# Patient Record
Sex: FEMALE | Race: WHITE | Employment: UNEMPLOYED | URBAN - METROPOLITAN AREA
[De-identification: names, ages, dates, MRNs, and addresses within clinical notes are randomized per-mention and may not be internally consistent; named-entity substitution may affect disease eponyms.]

---

## 2017-03-14 ENCOUNTER — HOSPITAL ENCOUNTER (OUTPATIENT)
Dept: MAMMOGRAPHY | Age: 42
Discharge: HOME | End: 2017-03-14
Attending: INTERNAL MEDICINE
Payer: COMMERCIAL

## 2017-03-14 DIAGNOSIS — Z12.31 VISIT FOR SCREENING MAMMOGRAM: ICD-10-CM

## 2017-03-14 PROCEDURE — 77067 SCR MAMMO BI INCL CAD: CPT

## 2018-03-16 ENCOUNTER — HOSPITAL ENCOUNTER (OUTPATIENT)
Dept: MAMMOGRAPHY | Age: 43
Discharge: HOME | End: 2018-03-16
Attending: INTERNAL MEDICINE
Payer: COMMERCIAL

## 2018-03-16 DIAGNOSIS — Z12.39 BREAST CANCER SCREENING: ICD-10-CM

## 2018-03-16 PROCEDURE — 77067 SCR MAMMO BI INCL CAD: CPT | Performed by: INTERNAL MEDICINE

## 2019-03-20 ENCOUNTER — HOSPITAL ENCOUNTER (OUTPATIENT)
Dept: MAMMOGRAPHY | Age: 44
Discharge: HOME | End: 2019-03-20
Attending: INTERNAL MEDICINE
Payer: COMMERCIAL

## 2019-03-20 DIAGNOSIS — Z12.31 ENCOUNTER FOR SCREENING MAMMOGRAM FOR MALIGNANT NEOPLASM OF BREAST: ICD-10-CM

## 2019-03-20 PROCEDURE — 77063 BREAST TOMOSYNTHESIS BI: CPT | Performed by: INTERNAL MEDICINE

## 2019-03-20 PROCEDURE — 77067 SCR MAMMO BI INCL CAD: CPT | Performed by: INTERNAL MEDICINE

## 2019-03-29 ENCOUNTER — HOSPITAL ENCOUNTER (OUTPATIENT)
Dept: ULTRASOUND IMAGING | Age: 44
Discharge: HOME | End: 2019-03-29
Attending: INTERNAL MEDICINE
Payer: COMMERCIAL

## 2019-03-29 ENCOUNTER — HOSPITAL ENCOUNTER (OUTPATIENT)
Dept: MAMMOGRAPHY | Age: 44
Discharge: HOME | End: 2019-03-29
Attending: INTERNAL MEDICINE
Payer: COMMERCIAL

## 2019-03-29 DIAGNOSIS — R92.2 INCONCLUSIVE MAMMOGRAM: ICD-10-CM

## 2019-03-29 PROCEDURE — 77065 DX MAMMO INCL CAD UNI: CPT | Performed by: INTERNAL MEDICINE

## 2019-03-29 PROCEDURE — 76642 ULTRASOUND BREAST LIMITED: CPT | Performed by: INTERNAL MEDICINE

## 2019-03-29 PROCEDURE — 77061 BREAST TOMOSYNTHESIS UNI: CPT | Performed by: INTERNAL MEDICINE

## 2020-06-11 ENCOUNTER — OFFICE VISIT (OUTPATIENT)
Dept: INTERNAL MEDICINE CLINIC | Facility: CLINIC | Age: 45
End: 2020-06-11
Payer: COMMERCIAL

## 2020-06-11 VITALS
OXYGEN SATURATION: 97 % | WEIGHT: 159 LBS | SYSTOLIC BLOOD PRESSURE: 122 MMHG | DIASTOLIC BLOOD PRESSURE: 68 MMHG | HEART RATE: 112 BPM | BODY MASS INDEX: 28.17 KG/M2 | HEIGHT: 63 IN | RESPIRATION RATE: 16 BRPM | TEMPERATURE: 98 F

## 2020-06-11 DIAGNOSIS — E10.9 TYPE 1 DIABETES MELLITUS WITHOUT COMPLICATION (HCC): ICD-10-CM

## 2020-06-11 DIAGNOSIS — Z00.00 ANNUAL PHYSICAL EXAM: Primary | ICD-10-CM

## 2020-06-11 DIAGNOSIS — Z13.89 SCREENING FOR GENITOURINARY CONDITION: ICD-10-CM

## 2020-06-11 DIAGNOSIS — Z20.822 ENCOUNTER FOR PREOPERATIVE SCREENING LABORATORY TESTING FOR COVID-19 VIRUS: ICD-10-CM

## 2020-06-11 DIAGNOSIS — Z13.220 LIPID SCREENING: ICD-10-CM

## 2020-06-11 DIAGNOSIS — Z13.29 THYROID DISORDER SCREEN: ICD-10-CM

## 2020-06-11 DIAGNOSIS — Z01.812 ENCOUNTER FOR PREOPERATIVE SCREENING LABORATORY TESTING FOR COVID-19 VIRUS: ICD-10-CM

## 2020-06-11 DIAGNOSIS — Z00.00 LABORATORY EXAMINATION ORDERED AS PART OF A ROUTINE GENERAL MEDICAL EXAMINATION: ICD-10-CM

## 2020-06-11 DIAGNOSIS — Z13.0 SCREENING, IRON DEFICIENCY ANEMIA: ICD-10-CM

## 2020-06-11 DIAGNOSIS — Z12.31 ENCOUNTER FOR SCREENING MAMMOGRAM FOR MALIGNANT NEOPLASM OF BREAST: ICD-10-CM

## 2020-06-11 DIAGNOSIS — I20.8 ATYPICAL ANGINA (HCC): ICD-10-CM

## 2020-06-11 PROBLEM — Z82.49 FAMILY HISTORY OF PREMATURE CAD: Status: ACTIVE | Noted: 2020-06-11

## 2020-06-11 PROCEDURE — 93000 ELECTROCARDIOGRAM COMPLETE: CPT | Performed by: INTERNAL MEDICINE

## 2020-06-11 PROCEDURE — 99213 OFFICE O/P EST LOW 20 MIN: CPT | Performed by: INTERNAL MEDICINE

## 2020-06-11 PROCEDURE — 99386 PREV VISIT NEW AGE 40-64: CPT | Performed by: INTERNAL MEDICINE

## 2020-06-11 RX ORDER — MULTIVIT-MIN/IRON/FOLIC ACID/K 18-600-40
50 CAPSULE ORAL DAILY
COMMUNITY

## 2020-06-11 RX ORDER — INSULIN ASPART 100 [IU]/ML
INJECTION, SOLUTION INTRAVENOUS; SUBCUTANEOUS
Qty: 7 VIAL | Refills: 0 | Status: SHIPPED | OUTPATIENT
Start: 2020-06-11 | End: 2020-09-04

## 2020-06-11 RX ORDER — ECHINACEA 400 MG
1000 CAPSULE ORAL DAILY
COMMUNITY

## 2020-06-11 RX ORDER — ROSUVASTATIN CALCIUM 20 MG/1
20 TABLET, COATED ORAL DAILY
Qty: 90 TABLET | Refills: 3 | Status: SHIPPED | OUTPATIENT
Start: 2020-06-11 | End: 2021-08-15

## 2020-06-11 RX ORDER — ATORVASTATIN CALCIUM 20 MG/1
20 TABLET, FILM COATED ORAL NIGHTLY
COMMUNITY
End: 2020-06-11

## 2020-06-11 NOTE — PATIENT INSTRUCTIONS
Diabetes: Activity Tips    Being more active can help you manage your diabetes. The tips on this sheet can help you get the most from your exercise. They can also help you stay safe.    Staying active   It’s important for adults to spend less time sitting You may be told to plan your exercise for 1 to 2 hours after a meal. In most cases, you don’t need to eat while being active. Test your blood sugar before exercising if you take insulin or medicine that can cause low blood sugar.  And carry a fast-acting condon

## 2020-06-11 NOTE — PROGRESS NOTES
HPI:    Patient ID: Jenny Foster is a 38year old female. HPI  HPI:   Jenny Foster is a 38year old female who presents for a complete physical exam. Symptoms: denies discharge, itching, burning or dysuria, periods are regular.  Patient complai mg by mouth daily. • Flaxseed, Linseed, (FLAXSEED OIL) 1000 MG Oral Cap Take 1,000 mg by mouth daily. • Rosuvastatin Calcium (CRESTOR) 20 MG Oral Tab Take 1 tablet (20 mg total) by mouth daily.  90 tablet 3   • insulin aspart (NOVOLOG) 100 UNIT/ML S (Oral)   Resp 16   Ht 63\"   Wt 159 lb (72.1 kg)   LMP 05/17/2020   SpO2 97%   BMI 28.17 kg/m²   Body mass index is 28.17 kg/m².    GENERAL: well developed, well nourished,in no apparent distress  SKIN: no rashes,no suspicious lesions  HEENT: atraumatic, no (VITAMIN D) 50 MCG (2000 UT) Oral Cap Take 50 capsules by mouth daily. • Vitamin E 180 MG Oral Cap Take 180 mg by mouth daily. • Flaxseed, Linseed, (FLAXSEED OIL) 1000 MG Oral Cap Take 1,000 mg by mouth daily.      • Rosuvastatin Calcium (CRESTOR) 2 ST#1434

## 2020-06-12 ENCOUNTER — HOSPITAL ENCOUNTER (OUTPATIENT)
Dept: MAMMOGRAPHY | Age: 45
Discharge: HOME | End: 2020-06-12
Attending: INTERNAL MEDICINE
Payer: COMMERCIAL

## 2020-06-12 DIAGNOSIS — Z12.31 ENCOUNTER FOR SCREENING MAMMOGRAM FOR MALIGNANT NEOPLASM OF BREAST: ICD-10-CM

## 2020-06-12 PROCEDURE — 77063 BREAST TOMOSYNTHESIS BI: CPT | Performed by: INTERNAL MEDICINE

## 2020-06-12 PROCEDURE — 77067 SCR MAMMO BI INCL CAD: CPT | Performed by: INTERNAL MEDICINE

## 2020-06-13 ENCOUNTER — LAB ENCOUNTER (OUTPATIENT)
Dept: LAB | Age: 45
End: 2020-06-13
Attending: INTERNAL MEDICINE
Payer: COMMERCIAL

## 2020-06-13 DIAGNOSIS — Z13.29 THYROID DISORDER SCREEN: ICD-10-CM

## 2020-06-13 DIAGNOSIS — Z13.220 LIPID SCREENING: ICD-10-CM

## 2020-06-13 DIAGNOSIS — E10.9 TYPE 1 DIABETES MELLITUS WITHOUT COMPLICATION (HCC): ICD-10-CM

## 2020-06-13 DIAGNOSIS — Z13.89 SCREENING FOR GENITOURINARY CONDITION: ICD-10-CM

## 2020-06-13 DIAGNOSIS — Z00.00 LABORATORY EXAMINATION ORDERED AS PART OF A ROUTINE GENERAL MEDICAL EXAMINATION: ICD-10-CM

## 2020-06-13 DIAGNOSIS — Z13.0 SCREENING, IRON DEFICIENCY ANEMIA: ICD-10-CM

## 2020-06-13 PROCEDURE — 82043 UR ALBUMIN QUANTITATIVE: CPT | Performed by: INTERNAL MEDICINE

## 2020-06-13 PROCEDURE — 81001 URINALYSIS AUTO W/SCOPE: CPT | Performed by: INTERNAL MEDICINE

## 2020-06-13 PROCEDURE — 83036 HEMOGLOBIN GLYCOSYLATED A1C: CPT | Performed by: INTERNAL MEDICINE

## 2020-06-13 PROCEDURE — 36415 COLL VENOUS BLD VENIPUNCTURE: CPT | Performed by: INTERNAL MEDICINE

## 2020-06-13 PROCEDURE — 80061 LIPID PANEL: CPT | Performed by: INTERNAL MEDICINE

## 2020-06-13 PROCEDURE — 80050 GENERAL HEALTH PANEL: CPT | Performed by: INTERNAL MEDICINE

## 2020-06-13 PROCEDURE — 82570 ASSAY OF URINE CREATININE: CPT | Performed by: INTERNAL MEDICINE

## 2020-06-15 ENCOUNTER — TELEPHONE (OUTPATIENT)
Dept: INTERNAL MEDICINE CLINIC | Facility: CLINIC | Age: 45
End: 2020-06-15

## 2020-06-15 DIAGNOSIS — E10.9 TYPE 1 DIABETES MELLITUS WITHOUT COMPLICATION (HCC): Primary | ICD-10-CM

## 2020-06-15 NOTE — TELEPHONE ENCOUNTER
----- Message from Garland Louis MD sent at 6/15/2020  8:40 AM CDT -----  Cont current med plan  dm2 uncontrolled. plan recheck a1c in 3 m  Normal cbc, renal/lft and UA  Cont statin for cad risk reduction

## 2020-06-15 NOTE — TELEPHONE ENCOUNTER
Left detailed message regarding results and recommendations. Call office with questions. Order placed.

## 2020-06-29 ENCOUNTER — MED REC SCAN ONLY (OUTPATIENT)
Dept: INTERNAL MEDICINE CLINIC | Facility: CLINIC | Age: 45
End: 2020-06-29

## 2020-08-13 ENCOUNTER — MED REC SCAN ONLY (OUTPATIENT)
Dept: INTERNAL MEDICINE CLINIC | Facility: CLINIC | Age: 45
End: 2020-08-13

## 2020-09-04 DIAGNOSIS — E10.9 TYPE 1 DIABETES MELLITUS WITHOUT COMPLICATION (HCC): ICD-10-CM

## 2020-09-04 RX ORDER — INSULIN ASPART 100 [IU]/ML
INJECTION, SOLUTION INTRAVENOUS; SUBCUTANEOUS
Qty: 70 ML | Refills: 0 | Status: SHIPPED | OUTPATIENT
Start: 2020-09-04 | End: 2020-12-17

## 2020-09-04 NOTE — TELEPHONE ENCOUNTER
Last time medication was refilled 6/11/2020  Quantity and number of refills 7 w/ 0  Last OV 6/11/2020  Next OV 9/11/2020

## 2020-09-11 ENCOUNTER — OFFICE VISIT (OUTPATIENT)
Dept: INTERNAL MEDICINE CLINIC | Facility: CLINIC | Age: 45
End: 2020-09-11
Payer: COMMERCIAL

## 2020-09-11 ENCOUNTER — LAB ENCOUNTER (OUTPATIENT)
Dept: LAB | Age: 45
End: 2020-09-11
Attending: INTERNAL MEDICINE
Payer: COMMERCIAL

## 2020-09-11 VITALS
HEIGHT: 63 IN | HEART RATE: 98 BPM | SYSTOLIC BLOOD PRESSURE: 118 MMHG | RESPIRATION RATE: 18 BRPM | OXYGEN SATURATION: 99 % | DIASTOLIC BLOOD PRESSURE: 64 MMHG | BODY MASS INDEX: 29.41 KG/M2 | TEMPERATURE: 99 F | WEIGHT: 166 LBS

## 2020-09-11 DIAGNOSIS — N93.8 DUB (DYSFUNCTIONAL UTERINE BLEEDING): ICD-10-CM

## 2020-09-11 DIAGNOSIS — Z28.21 INFLUENZA VACCINATION DECLINED BY PATIENT: ICD-10-CM

## 2020-09-11 DIAGNOSIS — Z23 NEED FOR INFLUENZA VACCINATION: ICD-10-CM

## 2020-09-11 DIAGNOSIS — E10.9 TYPE 1 DIABETES MELLITUS WITHOUT COMPLICATION (HCC): ICD-10-CM

## 2020-09-11 DIAGNOSIS — E10.9 TYPE 1 DIABETES MELLITUS WITHOUT COMPLICATION (HCC): Primary | ICD-10-CM

## 2020-09-11 LAB
EST. AVERAGE GLUCOSE BLD GHB EST-MCNC: 200 MG/DL (ref 68–126)
HBA1C MFR BLD HPLC: 8.6 % (ref ?–5.7)

## 2020-09-11 PROCEDURE — 99214 OFFICE O/P EST MOD 30 MIN: CPT | Performed by: INTERNAL MEDICINE

## 2020-09-11 PROCEDURE — 3074F SYST BP LT 130 MM HG: CPT | Performed by: INTERNAL MEDICINE

## 2020-09-11 PROCEDURE — 36415 COLL VENOUS BLD VENIPUNCTURE: CPT | Performed by: INTERNAL MEDICINE

## 2020-09-11 PROCEDURE — 3008F BODY MASS INDEX DOCD: CPT | Performed by: INTERNAL MEDICINE

## 2020-09-11 PROCEDURE — 83036 HEMOGLOBIN GLYCOSYLATED A1C: CPT | Performed by: INTERNAL MEDICINE

## 2020-09-11 PROCEDURE — 3078F DIAST BP <80 MM HG: CPT | Performed by: INTERNAL MEDICINE

## 2020-09-11 NOTE — PROGRESS NOTES
HPI:    Patient ID: Shai Sagastume is a 38year old female. Diabetes   She presents for her follow-up diabetic visit. She has type 1 diabetes mellitus. There are no hypoglycemic associated symptoms. There are no diabetic associated symptoms.  Gris Martin Negative for back pain. All other systems reviewed and are negative.            Current Outpatient Medications   Medication Sig Dispense Refill   • insulin aspart (NOVOLOG) 100 UNIT/ML Subcutaneous Solution INJECT 70 UNITS SUBCUTANEOUSLY ONCE DAILY 70 mL Check a1c  - refer to gyne for eval of DUB  Orders Placed This Encounter      Flulaval 6 months and older 0.5 ml PFS [47686]      Meds This Visit:  Requested Prescriptions      No prescriptions requested or ordered in this encounter       Imaging & Referra

## 2020-09-14 ENCOUNTER — TELEPHONE (OUTPATIENT)
Dept: INTERNAL MEDICINE CLINIC | Facility: CLINIC | Age: 45
End: 2020-09-14

## 2020-09-14 ENCOUNTER — TELEPHONE (OUTPATIENT)
Dept: OBGYN CLINIC | Facility: CLINIC | Age: 45
End: 2020-09-14

## 2020-09-14 DIAGNOSIS — E10.9 TYPE 1 DIABETES MELLITUS WITHOUT COMPLICATION (HCC): Primary | ICD-10-CM

## 2020-09-14 NOTE — TELEPHONE ENCOUNTER
CARLOSOVM to [address]     11/14/2020 (Expected date of lab draw) (Previously noted as 1/1/2020)

## 2020-09-14 NOTE — TELEPHONE ENCOUNTER
EXCESSIVE BLEEDING FOR 1 1/2 WEEKS  PLEASE CALL TO DISCUSS GETTING PT IN ASAP  PT CANCELLED APPT 9/11/20 BECAUSE SHE DOES NOT FEEL WELL    COUGH HEADACH AND NAUSEA - POSSIBLE COVID

## 2020-09-14 NOTE — TELEPHONE ENCOUNTER
----- Message from Haydee Lunsford MD sent at 9/12/2020  6:49 AM CDT -----  dm2 continues to be uncontrolled but since her accu checks have been at goal then I would recommend continuing current med plan and recheck a1c in 2 m

## 2020-09-14 NOTE — TELEPHONE ENCOUNTER
Pt was referred by Dr. Jamie Guerra for heavy vaginal bleeding. Pt had appt scheduled for today with Марина Kaur, but called to cancel because she is sick. Pt reports nausea and vomiting, headache since last night.   Pt advised to contact PCP to determine if Covid testin

## 2020-09-15 NOTE — TELEPHONE ENCOUNTER
Spoke with patient and discussed results and recommendations and understanding was expressed. Order placed.

## 2020-11-12 ENCOUNTER — TELEPHONE (OUTPATIENT)
Dept: INTERNAL MEDICINE CLINIC | Facility: CLINIC | Age: 45
End: 2020-11-12

## 2020-12-17 ENCOUNTER — PATIENT MESSAGE (OUTPATIENT)
Dept: INTERNAL MEDICINE CLINIC | Facility: CLINIC | Age: 45
End: 2020-12-17

## 2020-12-17 DIAGNOSIS — E10.9 TYPE 1 DIABETES MELLITUS WITHOUT COMPLICATION (HCC): Primary | ICD-10-CM

## 2020-12-17 DIAGNOSIS — E10.9 TYPE 1 DIABETES MELLITUS WITHOUT COMPLICATION (HCC): ICD-10-CM

## 2020-12-17 RX ORDER — INSULIN ASPART 100 [IU]/ML
INJECTION, SOLUTION INTRAVENOUS; SUBCUTANEOUS
Qty: 70 ML | Refills: 0 | Status: SHIPPED | OUTPATIENT
Start: 2020-12-17 | End: 2021-03-15

## 2020-12-17 RX ORDER — BLOOD SUGAR DIAGNOSTIC
STRIP MISCELLANEOUS
Qty: 100 STRIP | Refills: 3 | Status: SHIPPED | OUTPATIENT
Start: 2020-12-17 | End: 2021-05-20

## 2020-12-17 NOTE — TELEPHONE ENCOUNTER
From: Praveena Lazaro  To: Jenni Grigsby MD  Sent: 12/17/2020 1:16 PM CST  Subject: Prescription Question    I am using the Accu-check Guide blood glucose meter now. Can you send in a RX for AccuChek Guide strips testing 4 times per day?     Thanks  [employer] Bessie

## 2021-03-11 ENCOUNTER — OFFICE VISIT (OUTPATIENT)
Dept: OBGYN CLINIC | Facility: CLINIC | Age: 46
End: 2021-03-11
Payer: COMMERCIAL

## 2021-03-11 VITALS
DIASTOLIC BLOOD PRESSURE: 64 MMHG | BODY MASS INDEX: 29.73 KG/M2 | SYSTOLIC BLOOD PRESSURE: 126 MMHG | HEART RATE: 120 BPM | WEIGHT: 167.81 LBS | HEIGHT: 63 IN

## 2021-03-11 DIAGNOSIS — Z12.31 ENCOUNTER FOR SCREENING MAMMOGRAM FOR BREAST CANCER: ICD-10-CM

## 2021-03-11 DIAGNOSIS — N92.6 IRREGULAR MENSES: ICD-10-CM

## 2021-03-11 DIAGNOSIS — Z12.4 CERVICAL CANCER SCREENING: ICD-10-CM

## 2021-03-11 DIAGNOSIS — Z01.419 WELL WOMAN EXAM WITH ROUTINE GYNECOLOGICAL EXAM: Primary | ICD-10-CM

## 2021-03-11 PROCEDURE — 99203 OFFICE O/P NEW LOW 30 MIN: CPT | Performed by: NURSE PRACTITIONER

## 2021-03-11 PROCEDURE — 3078F DIAST BP <80 MM HG: CPT | Performed by: NURSE PRACTITIONER

## 2021-03-11 PROCEDURE — 99386 PREV VISIT NEW AGE 40-64: CPT | Performed by: NURSE PRACTITIONER

## 2021-03-11 PROCEDURE — 87624 HPV HI-RISK TYP POOLED RSLT: CPT | Performed by: NURSE PRACTITIONER

## 2021-03-11 PROCEDURE — 3074F SYST BP LT 130 MM HG: CPT | Performed by: NURSE PRACTITIONER

## 2021-03-11 PROCEDURE — 3008F BODY MASS INDEX DOCD: CPT | Performed by: NURSE PRACTITIONER

## 2021-03-11 RX ORDER — SUBCUTANEOUS INSULIN PUMP
EACH MISCELLANEOUS
COMMUNITY
Start: 2020-11-17

## 2021-03-11 RX ORDER — BLOOD-GLUCOSE TRANSMITTER
EACH MISCELLANEOUS
COMMUNITY
Start: 2020-11-17

## 2021-03-11 RX ORDER — BLOOD-GLUCOSE SENSOR
EACH MISCELLANEOUS
COMMUNITY
Start: 2020-11-17

## 2021-03-11 NOTE — PROGRESS NOTES
Here for new gynecology visit. 41year old G 0 P 0. Patient's last menstrual period was 02/25/2021 (exact date). .     Here for Annual Gynecologic Exam. Menses are typically Q 28-32 days for 4 days starting off heavy.  The last 5-6 months every other month Linseed, (FLAXSEED OIL) 1000 MG Oral Cap, Take 1,000 mg by mouth daily. , Disp: , Rfl:   Rosuvastatin Calcium (CRESTOR) 20 MG Oral Tab, Take 1 tablet (20 mg total) by mouth daily. , Disp: 90 tablet, Rfl: 3  ramipril 2.5 MG Oral Cap, TAKE ONE CAPSULE BY MOUTH her inability to tolerate penetration and that we could refer her for pelvic floor physical therapy. She can discuss pelvic floor relaxation techniques, vaginal dilators. She will let me know if she desires to proceed.     Additionally 20+ minutes was spent

## 2021-03-13 DIAGNOSIS — E10.9 TYPE 1 DIABETES MELLITUS WITHOUT COMPLICATION (HCC): ICD-10-CM

## 2021-03-15 RX ORDER — INSULIN ASPART 100 [IU]/ML
INJECTION, SOLUTION INTRAVENOUS; SUBCUTANEOUS
Qty: 70 ML | Refills: 0 | Status: SHIPPED | OUTPATIENT
Start: 2021-03-15 | End: 2021-08-10

## 2021-03-17 LAB — HPV I/H RISK 1 DNA SPEC QL NAA+PROBE: NEGATIVE

## 2021-05-20 DIAGNOSIS — E10.9 TYPE 1 DIABETES MELLITUS WITHOUT COMPLICATION (HCC): ICD-10-CM

## 2021-05-20 RX ORDER — BLOOD SUGAR DIAGNOSTIC
STRIP MISCELLANEOUS
Qty: 100 STRIP | Refills: 3 | Status: SHIPPED | OUTPATIENT
Start: 2021-05-20 | End: 2021-05-21

## 2021-05-20 NOTE — TELEPHONE ENCOUNTER
Fax request from [facility] in [place], [place] for a new RX on a 90 day supply for testing strips. There is none on file since she uses Accu Chek ; please call pharmacy.

## 2021-05-21 RX ORDER — BLOOD SUGAR DIAGNOSTIC
STRIP MISCELLANEOUS
Qty: 400 STRIP | Refills: 1 | Status: SHIPPED | OUTPATIENT
Start: 2021-05-21 | End: 2021-11-26

## 2021-06-14 ENCOUNTER — HOSPITAL ENCOUNTER (OUTPATIENT)
Dept: MAMMOGRAPHY | Age: 46
Discharge: HOME | End: 2021-06-14
Attending: NURSE PRACTITIONER
Payer: COMMERCIAL

## 2021-06-14 DIAGNOSIS — Z12.31 ENCOUNTER FOR SCREENING MAMMOGRAM FOR BREAST CANCER: ICD-10-CM

## 2021-06-14 PROCEDURE — 77063 BREAST TOMOSYNTHESIS BI: CPT | Performed by: NURSE PRACTITIONER

## 2021-06-14 PROCEDURE — 77067 SCR MAMMO BI INCL CAD: CPT | Performed by: NURSE PRACTITIONER

## 2021-06-15 ENCOUNTER — PATIENT MESSAGE (OUTPATIENT)
Dept: INTERNAL MEDICINE CLINIC | Facility: CLINIC | Age: 46
End: 2021-06-15

## 2021-06-15 DIAGNOSIS — Z00.00 ANNUAL PHYSICAL EXAM: Primary | ICD-10-CM

## 2021-06-15 NOTE — TELEPHONE ENCOUNTER
From: Faye Maynard  To: Isidro Maynard MD  Sent: 6/15/2021 1:16 PM CDT  Subject: Other    Hello Dr. Laci Kramer,  I have an appointment schedule with you on July 10th. Could I get a lab order to have drawn before my appt?  HbA1C, Lipid Panel, TSH, Comp Metabol

## 2021-07-08 ENCOUNTER — LAB ENCOUNTER (OUTPATIENT)
Dept: LAB | Age: 46
End: 2021-07-08
Attending: INTERNAL MEDICINE
Payer: COMMERCIAL

## 2021-07-08 DIAGNOSIS — Z00.00 ANNUAL PHYSICAL EXAM: ICD-10-CM

## 2021-07-08 LAB
ALBUMIN SERPL-MCNC: 3.7 G/DL (ref 3.4–5)
ALBUMIN/GLOB SERPL: 1 {RATIO} (ref 1–2)
ALP LIVER SERPL-CCNC: 75 U/L
ALT SERPL-CCNC: 27 U/L
ANION GAP SERPL CALC-SCNC: 5 MMOL/L (ref 0–18)
AST SERPL-CCNC: 12 U/L (ref 15–37)
BASOPHILS # BLD AUTO: 0.15 X10(3) UL (ref 0–0.2)
BASOPHILS NFR BLD AUTO: 1.9 %
BILIRUB SERPL-MCNC: 0.2 MG/DL (ref 0.1–2)
BUN BLD-MCNC: 12 MG/DL (ref 7–18)
BUN/CREAT SERPL: 14.5 (ref 10–20)
CALCIUM BLD-MCNC: 8.9 MG/DL (ref 8.5–10.1)
CHLORIDE SERPL-SCNC: 105 MMOL/L (ref 98–112)
CHOLEST SMN-MCNC: 176 MG/DL (ref ?–200)
CO2 SERPL-SCNC: 27 MMOL/L (ref 21–32)
CREAT BLD-MCNC: 0.83 MG/DL
CREAT UR-SCNC: 156 MG/DL
DEPRECATED RDW RBC AUTO: 44.2 FL (ref 35.1–46.3)
EOSINOPHIL # BLD AUTO: 0.39 X10(3) UL (ref 0–0.7)
EOSINOPHIL NFR BLD AUTO: 4.9 %
ERYTHROCYTE [DISTWIDTH] IN BLOOD BY AUTOMATED COUNT: 12.8 % (ref 11–15)
EST. AVERAGE GLUCOSE BLD GHB EST-MCNC: 229 MG/DL (ref 68–126)
GLOBULIN PLAS-MCNC: 3.6 G/DL (ref 2.8–4.4)
GLUCOSE BLD-MCNC: 217 MG/DL (ref 70–99)
HBA1C MFR BLD HPLC: 9.6 % (ref ?–5.7)
HCT VFR BLD AUTO: 42.8 %
HDLC SERPL-MCNC: 71 MG/DL (ref 40–59)
HGB BLD-MCNC: 13.5 G/DL
IMM GRANULOCYTES # BLD AUTO: 0.02 X10(3) UL (ref 0–1)
IMM GRANULOCYTES NFR BLD: 0.3 %
LDLC SERPL CALC-MCNC: 94 MG/DL (ref ?–100)
LYMPHOCYTES # BLD AUTO: 2.28 X10(3) UL (ref 1–4)
LYMPHOCYTES NFR BLD AUTO: 28.8 %
M PROTEIN MFR SERPL ELPH: 7.3 G/DL (ref 6.4–8.2)
MCH RBC QN AUTO: 29.7 PG (ref 26–34)
MCHC RBC AUTO-ENTMCNC: 31.5 G/DL (ref 31–37)
MCV RBC AUTO: 94.3 FL
MICROALBUMIN UR-MCNC: 11.6 MG/DL
MICROALBUMIN/CREAT 24H UR-RTO: 74.4 UG/MG (ref ?–30)
MONOCYTES # BLD AUTO: 0.65 X10(3) UL (ref 0.1–1)
MONOCYTES NFR BLD AUTO: 8.2 %
NEUTROPHILS # BLD AUTO: 4.42 X10 (3) UL (ref 1.5–7.7)
NEUTROPHILS # BLD AUTO: 4.42 X10(3) UL (ref 1.5–7.7)
NEUTROPHILS NFR BLD AUTO: 55.9 %
NONHDLC SERPL-MCNC: 105 MG/DL (ref ?–130)
OSMOLALITY SERPL CALC.SUM OF ELEC: 290 MOSM/KG (ref 275–295)
PATIENT FASTING Y/N/NP: YES
PATIENT FASTING Y/N/NP: YES
PLATELET # BLD AUTO: 414 10(3)UL (ref 150–450)
POTASSIUM SERPL-SCNC: 4.2 MMOL/L (ref 3.5–5.1)
RBC # BLD AUTO: 4.54 X10(6)UL
SODIUM SERPL-SCNC: 137 MMOL/L (ref 136–145)
T4 FREE SERPL-MCNC: 0.9 NG/DL (ref 0.8–1.7)
TRIGL SERPL-MCNC: 54 MG/DL (ref 30–149)
TSI SER-ACNC: 8.61 MIU/ML (ref 0.36–3.74)
VIT D+METAB SERPL-MCNC: 25.3 NG/ML (ref 30–100)
VLDLC SERPL CALC-MCNC: 9 MG/DL (ref 0–30)
WBC # BLD AUTO: 7.9 X10(3) UL (ref 4–11)

## 2021-07-08 PROCEDURE — 3060F POS MICROALBUMINURIA REV: CPT | Performed by: INTERNAL MEDICINE

## 2021-07-08 PROCEDURE — 3061F NEG MICROALBUMINURIA REV: CPT | Performed by: INTERNAL MEDICINE

## 2021-07-08 PROCEDURE — 80050 GENERAL HEALTH PANEL: CPT | Performed by: INTERNAL MEDICINE

## 2021-07-08 PROCEDURE — 82043 UR ALBUMIN QUANTITATIVE: CPT | Performed by: INTERNAL MEDICINE

## 2021-07-08 PROCEDURE — 3046F HEMOGLOBIN A1C LEVEL >9.0%: CPT | Performed by: INTERNAL MEDICINE

## 2021-07-08 PROCEDURE — 82306 VITAMIN D 25 HYDROXY: CPT | Performed by: INTERNAL MEDICINE

## 2021-07-08 PROCEDURE — 82570 ASSAY OF URINE CREATININE: CPT | Performed by: INTERNAL MEDICINE

## 2021-07-08 PROCEDURE — 84439 ASSAY OF FREE THYROXINE: CPT | Performed by: INTERNAL MEDICINE

## 2021-07-08 PROCEDURE — 80061 LIPID PANEL: CPT | Performed by: INTERNAL MEDICINE

## 2021-07-08 PROCEDURE — 83036 HEMOGLOBIN GLYCOSYLATED A1C: CPT | Performed by: INTERNAL MEDICINE

## 2021-07-10 ENCOUNTER — OFFICE VISIT (OUTPATIENT)
Dept: INTERNAL MEDICINE CLINIC | Facility: CLINIC | Age: 46
End: 2021-07-10
Payer: COMMERCIAL

## 2021-07-10 VITALS
DIASTOLIC BLOOD PRESSURE: 72 MMHG | SYSTOLIC BLOOD PRESSURE: 118 MMHG | RESPIRATION RATE: 16 BRPM | TEMPERATURE: 98 F | HEART RATE: 101 BPM | OXYGEN SATURATION: 98 % | WEIGHT: 170 LBS | HEIGHT: 63 IN | BODY MASS INDEX: 30.12 KG/M2

## 2021-07-10 DIAGNOSIS — E10.9 TYPE 1 DIABETES MELLITUS WITHOUT COMPLICATION (HCC): ICD-10-CM

## 2021-07-10 DIAGNOSIS — Z12.11 COLON CANCER SCREENING: ICD-10-CM

## 2021-07-10 DIAGNOSIS — Z00.00 ANNUAL PHYSICAL EXAM: Primary | ICD-10-CM

## 2021-07-10 DIAGNOSIS — E03.8 SUBCLINICAL HYPOTHYROIDISM: ICD-10-CM

## 2021-07-10 DIAGNOSIS — Z23 NEED FOR PNEUMOCOCCAL VACCINATION: ICD-10-CM

## 2021-07-10 PROBLEM — E11.69 HYPERLIPIDEMIA ASSOCIATED WITH TYPE 2 DIABETES MELLITUS (HCC): Status: ACTIVE | Noted: 2021-07-10

## 2021-07-10 PROBLEM — E78.5 HYPERLIPIDEMIA ASSOCIATED WITH TYPE 2 DIABETES MELLITUS  (HCC): Status: ACTIVE | Noted: 2021-07-10

## 2021-07-10 PROBLEM — E11.69 HYPERLIPIDEMIA ASSOCIATED WITH TYPE 2 DIABETES MELLITUS  (HCC): Status: ACTIVE | Noted: 2021-07-10

## 2021-07-10 PROBLEM — E78.5 HYPERLIPIDEMIA ASSOCIATED WITH TYPE 2 DIABETES MELLITUS (HCC): Status: ACTIVE | Noted: 2021-07-10

## 2021-07-10 PROBLEM — E78.5 HYPERLIPIDEMIA ASSOCIATED WITH TYPE 2 DIABETES MELLITUS: Status: ACTIVE | Noted: 2021-07-10

## 2021-07-10 PROBLEM — E11.69 HYPERLIPIDEMIA ASSOCIATED WITH TYPE 2 DIABETES MELLITUS: Status: ACTIVE | Noted: 2021-07-10

## 2021-07-10 PROCEDURE — 3074F SYST BP LT 130 MM HG: CPT | Performed by: INTERNAL MEDICINE

## 2021-07-10 PROCEDURE — 90732 PPSV23 VACC 2 YRS+ SUBQ/IM: CPT | Performed by: INTERNAL MEDICINE

## 2021-07-10 PROCEDURE — 3008F BODY MASS INDEX DOCD: CPT | Performed by: INTERNAL MEDICINE

## 2021-07-10 PROCEDURE — 3078F DIAST BP <80 MM HG: CPT | Performed by: INTERNAL MEDICINE

## 2021-07-10 PROCEDURE — 90471 IMMUNIZATION ADMIN: CPT | Performed by: INTERNAL MEDICINE

## 2021-07-10 PROCEDURE — 99396 PREV VISIT EST AGE 40-64: CPT | Performed by: INTERNAL MEDICINE

## 2021-07-10 RX ORDER — LEVOTHYROXINE SODIUM 0.05 MG/1
50 TABLET ORAL
Qty: 90 TABLET | Refills: 0 | Status: SHIPPED | OUTPATIENT
Start: 2021-07-10 | End: 2021-10-01

## 2021-07-10 NOTE — PROGRESS NOTES
HPI/Subjective:   Patient ID: Tiki Enriquez is a 41year old female.     Diabetes      HPI:   Tiki Enriquez is a 41year old female who presents for a complete physical exam. Symptoms: denies discharge, itching, burning or dysuria, periods are regul 50 MCG Oral Tab Take 1 tablet (50 mcg total) by mouth before breakfast. 90 tablet 0   • Glucose Blood (ACCU-CHEK GUIDE) In Vitro Strip Test Blood Glucose 4 times daily as directed.  400 strip 1   • insulin aspart (NOVOLOG) 100 UNIT/ML Subcutaneous Solution GENERAL: feels well otherwise  SKIN: denies any unusual skin lesions  EYES:denies blurred vision or double vision  HEENT: denies nasal congestion, sinus pain or ST  LUNGS: denies shortness of breath with exertion  CARDIOVASCULAR: denies chest pain on exe for: exercise, low fat diet  Body mass index is 30.11 kg/m². , recommended low fat diet and aerobic exercise 30 minutes three times weekly. The patient indicates understanding of these issues and agrees to the plan.   The patient is asked to return for CP Visit:  Requested Prescriptions     Signed Prescriptions Disp Refills   • Levothyroxine Sodium 50 MCG Oral Tab 90 tablet 0     Sig: Take 1 tablet (50 mcg total) by mouth before breakfast.       Imaging & Referrals:  PNEUMOCOCCAL IMM (PNEUMOVAX)  GASTRO - I

## 2021-07-10 NOTE — PATIENT INSTRUCTIONS
Diet: Diabetes  Food is an important tool that you can use to control diabetes and stay healthy. Eating well-balanced meals in the correct amounts will help you control your blood glucose levels and prevent low blood sugar reactions.  It will also help yo water or calorie-free diet drinks instead. · Eat less fat to help lower your risk of heart disease. Use nonfat or low-fat dairy products and lean meats. Avoid fried foods. Use cooking oils that are unsaturated, such as olive, canola, or peanut oil.   · Stella Layton

## 2021-08-10 DIAGNOSIS — E10.9 TYPE 1 DIABETES MELLITUS WITHOUT COMPLICATION (HCC): ICD-10-CM

## 2021-08-10 RX ORDER — INSULIN ASPART 100 [IU]/ML
INJECTION, SOLUTION INTRAVENOUS; SUBCUTANEOUS
Qty: 70 ML | Refills: 0 | Status: SHIPPED | OUTPATIENT
Start: 2021-08-10 | End: 2021-10-07

## 2021-08-10 NOTE — TELEPHONE ENCOUNTER
Last time medication was refilled 3/46922   Quantity  and number of refills 70 ml pen    Last office visit 7/2021   Next office visit 1/2022

## 2021-08-15 DIAGNOSIS — E10.9 TYPE 1 DIABETES MELLITUS WITHOUT COMPLICATION (HCC): ICD-10-CM

## 2021-08-15 RX ORDER — ROSUVASTATIN CALCIUM 20 MG/1
TABLET, COATED ORAL
Qty: 90 TABLET | Refills: 3 | Status: ON HOLD | OUTPATIENT
Start: 2021-08-15 | End: 2022-07-13

## 2021-09-16 ENCOUNTER — MED REC SCAN ONLY (OUTPATIENT)
Dept: INTERNAL MEDICINE CLINIC | Facility: CLINIC | Age: 46
End: 2021-09-16

## 2021-09-28 ENCOUNTER — TELEPHONE (OUTPATIENT)
Dept: INTERNAL MEDICINE CLINIC | Facility: CLINIC | Age: 46
End: 2021-09-28

## 2021-09-28 NOTE — TELEPHONE ENCOUNTER
Refill Req, diabetic supplies    [employer]    Pump Supplies  Every 3 days    Diabetic Supplies  4x per day    Continuous Glucose Monitoring  Transmitter and related supplies (tape, batteries, alcohol swabs)     Guardian Sensor 3 labeled for up to 7 days

## 2021-10-01 DIAGNOSIS — E03.8 SUBCLINICAL HYPOTHYROIDISM: ICD-10-CM

## 2021-10-01 RX ORDER — LEVOTHYROXINE SODIUM 0.05 MG/1
50 TABLET ORAL
Qty: 30 TABLET | Refills: 0 | Status: SHIPPED | OUTPATIENT
Start: 2021-10-01 | End: 2021-11-10

## 2021-10-01 NOTE — TELEPHONE ENCOUNTER
Requested Prescriptions     Pending Prescriptions Disp Refills   • LEVOTHYROXINE 50 MCG Oral Tab [Pharmacy Med Name: LEVOTHYROXINE 50 MCG TABLET] 90 tablet 0     Sig: TAKE 1 TABLET (50 MCG TOTAL) BY MOUTH BEFORE BREAKFAST.      Last refill #90 on 7/10/2021

## 2021-10-07 DIAGNOSIS — E10.9 TYPE 1 DIABETES MELLITUS WITHOUT COMPLICATION (HCC): ICD-10-CM

## 2021-10-07 RX ORDER — INSULIN ASPART 100 [IU]/ML
INJECTION, SOLUTION INTRAVENOUS; SUBCUTANEOUS
Qty: 70 ML | Refills: 0 | Status: SHIPPED | OUTPATIENT
Start: 2021-10-07 | End: 2022-01-14

## 2021-10-07 NOTE — TELEPHONE ENCOUNTER
Fax received requesting refill of:  insulin aspart (NOVOLOG) 100 UNIT/ML Subcutaneous Solution     To be sent to  [facility]/PHARMACY #[id number]- [address] [facility] [address]  [phone], [phone]

## 2021-10-11 ENCOUNTER — MED REC SCAN ONLY (OUTPATIENT)
Dept: INTERNAL MEDICINE CLINIC | Facility: CLINIC | Age: 46
End: 2021-10-11

## 2021-10-23 DIAGNOSIS — E03.8 SUBCLINICAL HYPOTHYROIDISM: ICD-10-CM

## 2021-10-23 RX ORDER — LEVOTHYROXINE SODIUM 0.05 MG/1
50 TABLET ORAL
Qty: 30 TABLET | Refills: 0 | OUTPATIENT
Start: 2021-10-23

## 2021-10-23 NOTE — TELEPHONE ENCOUNTER
rx denied. On 10/1 pt informed to get labs done prior to next refill. Robi [facility] sent 10/23 to get labs done so we can refill medication.

## 2021-11-01 ENCOUNTER — LAB ENCOUNTER (OUTPATIENT)
Dept: LAB | Age: 46
End: 2021-11-01
Attending: INTERNAL MEDICINE
Payer: COMMERCIAL

## 2021-11-01 DIAGNOSIS — E03.8 SUBCLINICAL HYPOTHYROIDISM: ICD-10-CM

## 2021-11-01 DIAGNOSIS — Z00.00 ANNUAL PHYSICAL EXAM: ICD-10-CM

## 2021-11-01 DIAGNOSIS — E10.9 TYPE 1 DIABETES MELLITUS WITHOUT COMPLICATION (HCC): ICD-10-CM

## 2021-11-01 PROCEDURE — 84439 ASSAY OF FREE THYROXINE: CPT | Performed by: INTERNAL MEDICINE

## 2021-11-01 PROCEDURE — 83036 HEMOGLOBIN GLYCOSYLATED A1C: CPT | Performed by: INTERNAL MEDICINE

## 2021-11-01 PROCEDURE — 84443 ASSAY THYROID STIM HORMONE: CPT | Performed by: INTERNAL MEDICINE

## 2021-11-01 PROCEDURE — 81001 URINALYSIS AUTO W/SCOPE: CPT | Performed by: INTERNAL MEDICINE

## 2021-11-02 ENCOUNTER — TELEPHONE (OUTPATIENT)
Dept: INTERNAL MEDICINE CLINIC | Facility: CLINIC | Age: 46
End: 2021-11-02

## 2021-11-02 DIAGNOSIS — E10.65 UNCONTROLLED TYPE 1 DIABETES MELLITUS WITH HYPERGLYCEMIA (HCC): ICD-10-CM

## 2021-11-02 DIAGNOSIS — E03.8 SUBCLINICAL HYPOTHYROIDISM: Primary | ICD-10-CM

## 2021-11-02 DIAGNOSIS — R82.90 ABNORMAL URINALYSIS: ICD-10-CM

## 2021-11-02 NOTE — TELEPHONE ENCOUNTER
Subjective   Patient ID: Sandhya is a 55 year old female.    Chief Complaint   Patient presents with   • Office Visit     Cardiology   • Follow-up     Last seen 9/8/2020         HPI:   ====    Patient has not been in the office for a long time  She said she is okay with diet  No more exercise  Concern about her family  No angina no chest pain  No shortness of breath  No PND no orthopnea  No ankle edema  Has all her medication bottles  Reported no side effect  Blood sugar at home occasionally above 200      Past Medical History:   Diagnosis Date   • Atherosclerotic heart disease    • Chronic combined systolic and diastolic CHF (congestive heart failure) (CMS/[facility])    • CKD (chronic kidney disease)    • Dilated cardiomyopathy (CMS/[facility])    • Essential (primary) hypertension    • Mixed hyperlipidemia    • Type 2 diabetes mellitus (CMS/[facility])        ALLERGIES:  No Known Allergies     No past surgical history on file.    Family History   Problem Relation Age of Onset   • Cancer Father        Social History     Tobacco Use   • Smoking status: Never Smoker   • Smokeless tobacco: Never Used   Substance Use Topics   • Alcohol use: Never     Frequency: Never   • Drug use: Never        Recent hospitalization:  ====================   None     Review of Systems:   ================    Constitutional:  No chills, and no malaise  Eyes:  No change in eyesight, no pain  Ears, nose, mouth, throat, and face:  No pain, no swelling   Respiratory:  No hemoptysis  Cardiovascular:  No palpitation  Gastrointestinal:  No melena  Genitourinary:  No hematuria  Musculoskeletal:  No muscle pain  Neurological:  No change in speech  Behavioral/Psych:  No change in mood  Skin : no itching   =============================================================================    Objective  =========  Vitals:    03/23/21 1228 03/23/21 1229   BP: (!) 179/82 (!) 171/92   BP Location: LUE - Left upper extremity RUE - Right upper extremity   Patient Position:  Neville Bonilla MD  [facility] Clinical Staff  Increase levothyroxine to 75 mcg daily and recheck tsh in 6 weeks      Dm1 not controlled. Refer to [facility] EMG clinic for help with med titration     UA consistent with uncontrolled dm2   Large blood in UA.  Check if Sitting Sitting   Cuff Size: Regular Regular   Pulse: 65    Temp: 96.6 °F (35.9 °C)    TempSrc: Temporal    SpO2: 100%    Weight: 48.5 kg (107 lb)    Height: 4' 1\" (1.245 m)    PainSc:  0         Physical Exam:  =============  General: Alert, cooperative, no distress.  Small and tiny  Head: No obvious abnormality  Eyes: Normal, no jaundice.  Throat: Lips, mucosa, moist and normal.  Neck: Supple.  Back: Symmetric.  Lungs: Clear, no wheezing, no rhonchi and no rales.  Heart: Regular rate and rhythm, S1, S2 normal.  Abdomen: Soft, non-tender.  Extremities: No edema.  Skin: Normal texture  Neurologic: No clear deficit.    =============================================================================    Data  =====    No recent lab    EKG done today, tracing was reviewed  NSR , WNL          ==============================================================================     Assessment :  ============    Coronary artery disease involving native coronary artery of native heart without angina pectoris  (primary encounter diagnosis)  Chronic diastolic congestive heart failure (CMS/HCC)  Essential hypertension, benign  Type 1 diabetes mellitus with diabetic peripheral angiopathy with gangrene (CMS/HCC)  Mixed hyperlipidemia  Encounter for screening mammogram for malignant neoplasm of breast  Osteoporosis screening    Plan:  =====    Blood pressure is elevated  Repeat blood pressure manually confirmed still elevated  Get an echocardiogram evaluate LV systolic function  Lab tests including A1c and lipid    Orders Placed This Encounter   • DEXA SCAN AXIAL SKELETON   • MAMMO SCREENING BILATERAL   • Glycohemoglobin   • Lipid Panel With Reflex   • Comprehensive Metabolic Panel   • CBC No Differential   • Electrocardiogram 12-Lead   • Transthoracic Echo (TTE) Complete   • DISCONTD: amLODIPine (Norvasc) 2.5 MG tablet   • amLODIPine (Norvasc) 2.5 MG tablet   • aspirin 325 MG tablet   • atorvastatin (LIPITOR) 40 MG tablet   • furosemide  (LASIX) 40 MG tablet   • ezetimibe (ZETIA) 10 MG tablet   • losartan (COZAAR) 100 MG tablet   • metoPROLOL succinate (TOPROL-XL) 50 MG 24 hr tablet   • sitaGLIPtin (JANUVIA) 100 MG tablet            Current Outpatient Medications   Medication Sig Dispense Refill   • aspirin 325 MG tablet Take 1 tablet by mouth daily. 90 tablet 3   • atorvastatin (LIPITOR) 40 MG tablet Take 1 tablet by mouth daily. 90 tablet 0   • furosemide (LASIX) 40 MG tablet Take 1 tablet by mouth daily. 30 tablet 0   • ezetimibe (ZETIA) 10 MG tablet Take 1 tablet by mouth daily. 30 tablet 0   • losartan (COZAAR) 100 MG tablet Take 1 tablet by mouth daily. 90 tablet 3   • metoPROLOL succinate (TOPROL-XL) 50 MG 24 hr tablet Take 1 tablet by mouth daily. 90 tablet 3   • sitaGLIPtin (JANUVIA) 100 MG tablet Take 1 tablet by mouth daily. 30 tablet 0   • amLODIPine (Norvasc) 2.5 MG tablet Take 1 tablet by mouth daily. 90 tablet 3     No current facility-administered medications for this visit.           Electronically signed by:  Brandon Villafana MD, 3/23/2021

## 2021-11-09 ENCOUNTER — LAB ENCOUNTER (OUTPATIENT)
Dept: LAB | Age: 46
End: 2021-11-09
Attending: INTERNAL MEDICINE
Payer: COMMERCIAL

## 2021-11-09 DIAGNOSIS — Z01.818 PRE-OP TESTING: ICD-10-CM

## 2021-11-10 RX ORDER — LEVOTHYROXINE SODIUM 0.07 MG/1
75 TABLET ORAL
Qty: 30 TABLET | Refills: 1 | Status: SHIPPED | OUTPATIENT
Start: 2021-11-10 | End: 2021-12-06

## 2021-11-10 NOTE — TELEPHONE ENCOUNTER
Spoke with pt regarding results, medication changes and referral.  Pt reports she was on her menstrual cycle at time of UA and would like to repeat the UA in 6 weeks when she has her TSH done. Ok per Clinton Randhawa.   Pt expressed understanding of labs and medica

## 2021-11-11 PROBLEM — D12.3 BENIGN NEOPLASM OF TRANSVERSE COLON: Status: ACTIVE | Noted: 2021-11-11

## 2021-11-11 PROBLEM — Z12.11 SPECIAL SCREENING FOR MALIGNANT NEOPLASMS, COLON: Status: ACTIVE | Noted: 2021-11-11

## 2021-11-26 DIAGNOSIS — E10.9 TYPE 1 DIABETES MELLITUS WITHOUT COMPLICATION (HCC): ICD-10-CM

## 2021-11-26 RX ORDER — BLOOD SUGAR DIAGNOSTIC
STRIP MISCELLANEOUS
Qty: 100 STRIP | Refills: 3 | Status: SHIPPED | OUTPATIENT
Start: 2021-11-26

## 2021-12-06 DIAGNOSIS — E03.8 SUBCLINICAL HYPOTHYROIDISM: ICD-10-CM

## 2021-12-06 RX ORDER — LEVOTHYROXINE SODIUM 0.07 MG/1
75 TABLET ORAL
Qty: 30 TABLET | Refills: 1 | Status: SHIPPED | OUTPATIENT
Start: 2021-12-06 | End: 2021-12-28

## 2021-12-28 DIAGNOSIS — E03.8 SUBCLINICAL HYPOTHYROIDISM: ICD-10-CM

## 2021-12-28 RX ORDER — LEVOTHYROXINE SODIUM 0.07 MG/1
75 TABLET ORAL
Qty: 30 TABLET | Refills: 1 | Status: SHIPPED | OUTPATIENT
Start: 2021-12-28 | End: 2021-12-30

## 2021-12-30 ENCOUNTER — TELEPHONE (OUTPATIENT)
Dept: INTERNAL MEDICINE CLINIC | Facility: CLINIC | Age: 46
End: 2021-12-30

## 2021-12-30 DIAGNOSIS — E03.8 SUBCLINICAL HYPOTHYROIDISM: ICD-10-CM

## 2021-12-30 RX ORDER — LEVOTHYROXINE SODIUM 0.07 MG/1
75 TABLET ORAL
Qty: 90 TABLET | Refills: 0 | Status: SHIPPED | OUTPATIENT
Start: 2021-12-30

## 2022-01-14 DIAGNOSIS — E10.9 TYPE 1 DIABETES MELLITUS WITHOUT COMPLICATION (HCC): ICD-10-CM

## 2022-01-14 RX ORDER — INSULIN ASPART 100 [IU]/ML
INJECTION, SOLUTION INTRAVENOUS; SUBCUTANEOUS
Qty: 70 ML | Refills: 0 | Status: SHIPPED | OUTPATIENT
Start: 2022-01-14

## 2022-01-14 NOTE — TELEPHONE ENCOUNTER
Last time medication was refilled 10/7/21  Quantity and number of refills 7 ml w/ 0   Last OV 7/10/21  Next OV 1/2022

## 2022-02-15 ENCOUNTER — OFFICE VISIT (OUTPATIENT)
Dept: INTERNAL MEDICINE CLINIC | Facility: CLINIC | Age: 47
End: 2022-02-15
Payer: COMMERCIAL

## 2022-02-15 VITALS
BODY MASS INDEX: 29.23 KG/M2 | TEMPERATURE: 98 F | HEART RATE: 88 BPM | DIASTOLIC BLOOD PRESSURE: 68 MMHG | RESPIRATION RATE: 16 BRPM | HEIGHT: 63 IN | WEIGHT: 165 LBS | OXYGEN SATURATION: 100 % | SYSTOLIC BLOOD PRESSURE: 114 MMHG

## 2022-02-15 DIAGNOSIS — R10.32 LLQ PAIN: Primary | ICD-10-CM

## 2022-02-15 DIAGNOSIS — R19.00 ABDOMINAL WALL BULGE: ICD-10-CM

## 2022-02-15 PROBLEM — Z12.11 SPECIAL SCREENING FOR MALIGNANT NEOPLASMS, COLON: Status: RESOLVED | Noted: 2021-11-11 | Resolved: 2022-02-15

## 2022-02-15 PROBLEM — D12.3 BENIGN NEOPLASM OF TRANSVERSE COLON: Status: RESOLVED | Noted: 2021-11-11 | Resolved: 2022-02-15

## 2022-02-15 PROCEDURE — 3008F BODY MASS INDEX DOCD: CPT | Performed by: PHYSICIAN ASSISTANT

## 2022-02-15 PROCEDURE — 3078F DIAST BP <80 MM HG: CPT | Performed by: PHYSICIAN ASSISTANT

## 2022-02-15 PROCEDURE — 99213 OFFICE O/P EST LOW 20 MIN: CPT | Performed by: PHYSICIAN ASSISTANT

## 2022-02-15 PROCEDURE — 3074F SYST BP LT 130 MM HG: CPT | Performed by: PHYSICIAN ASSISTANT

## 2022-02-15 RX ORDER — RIBOFLAVIN (VITAMIN B2) 100 MG
100 TABLET ORAL DAILY
COMMUNITY

## 2022-02-16 ENCOUNTER — TELEPHONE (OUTPATIENT)
Dept: INTERNAL MEDICINE CLINIC | Facility: CLINIC | Age: 47
End: 2022-02-16

## 2022-02-21 ENCOUNTER — HOSPITAL ENCOUNTER (OUTPATIENT)
Dept: CT IMAGING | Age: 47
Discharge: HOME | End: 2022-02-21
Attending: PHYSICIAN ASSISTANT
Payer: COMMERCIAL

## 2022-02-21 ENCOUNTER — TELEPHONE (OUTPATIENT)
Dept: INTERNAL MEDICINE CLINIC | Facility: CLINIC | Age: 47
End: 2022-02-21

## 2022-02-21 DIAGNOSIS — R10.32 LLQ PAIN: ICD-10-CM

## 2022-02-21 DIAGNOSIS — R19.00 ABDOMINAL WALL BULGE: ICD-10-CM

## 2022-02-21 PROCEDURE — 74176 CT ABD & PELVIS W/O CONTRAST: CPT | Performed by: PHYSICIAN ASSISTANT

## 2022-02-21 RX ORDER — NITROFURANTOIN 25; 75 MG/1; MG/1
100 CAPSULE ORAL 2 TIMES DAILY
Qty: 10 CAPSULE | Refills: 0 | Status: SHIPPED | OUTPATIENT
Start: 2022-02-21

## 2022-02-21 NOTE — TELEPHONE ENCOUNTER
Tila Hu PA-C  [facility] Clinical Staff  Please inform pt: no evidence of hernia. Bladder appears inflamed: if any dysuria, frequency, urgency, recommend taking macrobid 100mg bid x 5 days   Increase water intake for small non-obstructing kidney stones   Apparent cyst on patient's right ovary would not be causing her pain.

## 2022-02-21 NOTE — TELEPHONE ENCOUNTER
Patient notified of results and recommendations. Per patient, intermittent episodes of dysuria upon urination. Abx sent to preferred pharmacy.

## 2022-03-03 RX ORDER — BLOOD SUGAR DIAGNOSTIC
STRIP MISCELLANEOUS
Qty: 100 STRIP | Refills: 3 | Status: SHIPPED | OUTPATIENT
Start: 2022-03-03

## 2022-03-07 ENCOUNTER — PATIENT MESSAGE (OUTPATIENT)
Dept: INTERNAL MEDICINE CLINIC | Facility: CLINIC | Age: 47
End: 2022-03-07

## 2022-03-07 RX ORDER — SULFAMETHOXAZOLE AND TRIMETHOPRIM 800; 160 MG/1; MG/1
1 TABLET ORAL 2 TIMES DAILY
Qty: 6 TABLET | Refills: 0 | Status: SHIPPED | OUTPATIENT
Start: 2022-03-07

## 2022-03-07 NOTE — TELEPHONE ENCOUNTER
Per Atilio Orosco-    Bactrim DS BID x 3 days  If symptoms persist order urine culture. Med rx sent to preferred pharmacy.

## 2022-03-23 RX ORDER — LEVOTHYROXINE SODIUM 0.07 MG/1
75 TABLET ORAL
Qty: 90 TABLET | Refills: 0 | OUTPATIENT
Start: 2022-03-23

## 2022-03-23 NOTE — TELEPHONE ENCOUNTER
Pt has not completed her repeat labs. [facility] sent, hold for response. Pt has 30 days left of supply, currently waiting on new insurance and will complete labs, will send a message when she has 1 week left of supply and update us. Communicated via [facility].

## 2022-04-11 RX ORDER — INSULIN ASPART 100 [IU]/ML
INJECTION, SOLUTION INTRAVENOUS; SUBCUTANEOUS
Qty: 70 ML | Refills: 0 | Status: SHIPPED | OUTPATIENT
Start: 2022-04-11

## 2022-04-22 ENCOUNTER — PATIENT MESSAGE (OUTPATIENT)
Dept: INTERNAL MEDICINE CLINIC | Facility: CLINIC | Age: 47
End: 2022-04-22

## 2022-04-22 ENCOUNTER — OFFICE VISIT (OUTPATIENT)
Dept: INTERNAL MEDICINE CLINIC | Facility: CLINIC | Age: 47
End: 2022-04-22
Payer: COMMERCIAL

## 2022-04-22 VITALS
RESPIRATION RATE: 16 BRPM | OXYGEN SATURATION: 98 % | WEIGHT: 159 LBS | TEMPERATURE: 98 F | BODY MASS INDEX: 28.17 KG/M2 | DIASTOLIC BLOOD PRESSURE: 70 MMHG | HEART RATE: 82 BPM | HEIGHT: 63 IN | SYSTOLIC BLOOD PRESSURE: 116 MMHG

## 2022-04-22 DIAGNOSIS — N63.11 BREAST LUMP ON RIGHT SIDE AT 11 O'CLOCK POSITION: Primary | ICD-10-CM

## 2022-04-22 NOTE — TELEPHONE ENCOUNTER
From: Yamile Rose  To: Lavinia Hart PA-C  Sent: 4/22/2022 9:36 AM CDT  Subject: Weight    Hello,  I just left the office and noticed on my weight on chart for today is 169.  When I weighed in the office it was 159 lb

## 2022-04-26 ENCOUNTER — TELEPHONE (OUTPATIENT)
Dept: INTERNAL MEDICINE CLINIC | Facility: CLINIC | Age: 47
End: 2022-04-26

## 2022-04-26 ENCOUNTER — HOSPITAL ENCOUNTER (OUTPATIENT)
Dept: MAMMOGRAPHY | Facility: HOSPITAL | Age: 47
Discharge: HOME | End: 2022-04-26
Attending: PHYSICIAN ASSISTANT
Payer: COMMERCIAL

## 2022-04-26 DIAGNOSIS — N63.11 BREAST LUMP ON RIGHT SIDE AT 11 O'CLOCK POSITION: ICD-10-CM

## 2022-04-26 PROCEDURE — 77061 BREAST TOMOSYNTHESIS UNI: CPT | Performed by: PHYSICIAN ASSISTANT

## 2022-04-26 PROCEDURE — 76642 ULTRASOUND BREAST LIMITED: CPT | Performed by: PHYSICIAN ASSISTANT

## 2022-04-26 PROCEDURE — 77065 DX MAMMO INCL CAD UNI: CPT | Performed by: PHYSICIAN ASSISTANT

## 2022-04-26 NOTE — TELEPHONE ENCOUNTER
----- Message from Latoya Snyder PA-C sent at 4/26/2022  1:55 PM CDT -----  US-guided biopsy is recommended for right breast nodule

## 2022-04-26 NOTE — IMAGING NOTE
This Breast Care RN assisted Dr. Johnathan Adams with recommendation for a right breast 1 site ultrasound guided biopsy for mass. Procedure reviewed and all questions answered. Emotional and educational support given. On the day of the biopsy, pt instructed to take Tylenol 1000mg PO, eat a light meal & bring or wear a sports bra. Post biopsy care also reviewed with pt to include NO lifting more than 5lbs, no exercising or housework (limit upper body movement) for 24-48 hrs post biopsy. Patient denies blood thinners, bleeding disorders, liver disease, chemo, and pregnancy. Pt verbalized understanding. Our breast center schedulers will be calling to schedule an appt that is convenient for pt.

## 2022-04-26 NOTE — TELEPHONE ENCOUNTER
Called Mammogram dept regarding order. Spoke with Carolann Dsouza and she stated she is faxing over the order form to be signed off on for the [address] [facility] guided biopsy. We will need it signed then faxed back. Hold for fax.    Mammogram dept: ext [phone]

## 2022-04-27 ENCOUNTER — HOSPITAL ENCOUNTER (OUTPATIENT)
Dept: MAMMOGRAPHY | Facility: HOSPITAL | Age: 47
Discharge: HOME | End: 2022-04-27
Attending: PHYSICIAN ASSISTANT
Payer: COMMERCIAL

## 2022-04-27 DIAGNOSIS — N63.0 BREAST NODULE: ICD-10-CM

## 2022-04-27 PROCEDURE — 88305 TISSUE EXAM BY PATHOLOGIST: CPT | Performed by: PHYSICIAN ASSISTANT

## 2022-04-27 PROCEDURE — 88342 IMHCHEM/IMCYTCHM 1ST ANTB: CPT | Performed by: PHYSICIAN ASSISTANT

## 2022-04-27 PROCEDURE — 19083 BX BREAST 1ST LESION US IMAG: CPT | Performed by: PHYSICIAN ASSISTANT

## 2022-04-27 PROCEDURE — 77065 DX MAMMO INCL CAD UNI: CPT | Performed by: PHYSICIAN ASSISTANT

## 2022-04-27 PROCEDURE — 88341 IMHCHEM/IMCYTCHM EA ADD ANTB: CPT | Performed by: PHYSICIAN ASSISTANT

## 2022-04-28 NOTE — TELEPHONE ENCOUNTER
US breast biopsy completed. I called mammography dept and was told since it was a super order they did not need an additional order.

## 2022-04-29 ENCOUNTER — TELEPHONE (OUTPATIENT)
Dept: INTERNAL MEDICINE CLINIC | Facility: CLINIC | Age: 47
End: 2022-04-29

## 2022-04-29 NOTE — IMAGING NOTE
1327: Spoke with Mora Bhakta post ultrasound guided right breast biopsy. Name and date of birth verified by Ms. Silke Multani. Introduced myself as breast care coordinator. Reinforced post biopsy care and instruction. Ms. Silke Multani denies any issues with biopsy site- bleeding, drainage, redness, tenderness. DEMETRIS Hu reported pathology results and recommendation to Mora Bhakta earlier today. Pathology results and recommendations reviewed as follows:   Final Diagnosis:   Right breast mass 9:00, ultrasound-guided 12-gauge needle core biopsies:  -Infarcted tissue, edged by reactive fibroinflammatory reaction.  -Necrotic neoplasm cannot be excluded. -See comment. Electronically signed by Kee Robbins MD on 4/29/2022 at 2381     Recommendation surgical referral  [address] referring to Dr. Taylor Garcia. Informed Mora Livia that the breast care coordinator or the breast nurse navigators would contact her with an appointment for Dr. Taylor Garcia  Phone numbers provided for Dr. Taylor Garcia, the breast nurse navigators, and myself. Mora Bhakta verbalized understanding and agreement to the above.

## 2022-04-29 NOTE — TELEPHONE ENCOUNTER
Claire Salter RN from Breast imaging dept. Reaching out in regard to pathology report from 4/27. Notified Claire Salter patient informed of results on 4/29 by Cruz Painter and referred out to Dr. Lisseth Martins for excisional biopsy. Claire Salter verbalized understanding and states she will reach out to Dr. Luke Taylor office and patient to assist in schedule appointment for patient.

## 2022-05-02 ENCOUNTER — TELEPHONE (OUTPATIENT)
Dept: MAMMOGRAPHY | Facility: HOSPITAL | Age: 47
End: 2022-05-02

## 2022-05-02 NOTE — TELEPHONE ENCOUNTER
Called and spoke to patient. Patient given an appointment with Dr Lauran Opitz on 5-6-22 at 3;30  Patient verbalized understanding and has no further questions at this time.

## 2022-05-04 ENCOUNTER — NURSE NAVIGATOR ENCOUNTER (OUTPATIENT)
Dept: HEMATOLOGY/ONCOLOGY | Facility: HOSPITAL | Age: 47
End: 2022-05-04

## 2022-05-04 NOTE — PROGRESS NOTES
Phoned patient and we discussed biopsy results. Introduced myself as one of the breast nurse navigators and explained the role of the breast nurse navigator. Explained the role of the physicians on her breast cancer care team.  Explained the breast cancer multidisciplinary meeting and that her case will be discussed. Patient given my contact information to call with any further questions or concerns.

## 2022-05-06 ENCOUNTER — OFFICE VISIT (OUTPATIENT)
Dept: SURGERY | Facility: CLINIC | Age: 47
End: 2022-05-06
Payer: COMMERCIAL

## 2022-05-06 ENCOUNTER — NURSE NAVIGATOR ENCOUNTER (OUTPATIENT)
Dept: HEMATOLOGY/ONCOLOGY | Facility: HOSPITAL | Age: 47
End: 2022-05-06

## 2022-05-06 VITALS
HEART RATE: 95 BPM | HEIGHT: 63 IN | SYSTOLIC BLOOD PRESSURE: 133 MMHG | WEIGHT: 158 LBS | OXYGEN SATURATION: 98 % | RESPIRATION RATE: 16 BRPM | BODY MASS INDEX: 28 KG/M2 | TEMPERATURE: 98 F | DIASTOLIC BLOOD PRESSURE: 80 MMHG

## 2022-05-06 DIAGNOSIS — N63.11 MASS OF UPPER OUTER QUADRANT OF RIGHT BREAST: Primary | ICD-10-CM

## 2022-05-06 DIAGNOSIS — R92.2 INCONCLUSIVE MAMMOGRAM DUE TO DENSE BREASTS: ICD-10-CM

## 2022-05-06 NOTE — PATIENT INSTRUCTIONS
Dr. Josie Mendez  Tel: [phone]  Fax: [facility] [address] [place], [address]  [phone]     Surgery/Procedure: Right breast wire localized excisional biopsy     Anesthesia:   MAC  Surgery Length:   45 minutes CPT:  77059   Wire LOC:   Yes Nuc Med:   No   Priya Seed:  No       Dx & ICD-10: Mass of upper outer quadrant of right breast (N63.11), Inconclusive mammogram due to dense breasts (R92.2). Radiology Instructions: Right breast, 9 o'clock position, pellet shaped clip, biopsy demonstrates infarcted tissue edge by reactive fibro inflammatory reaction, which is discordant.    _______________________________________________________________________________    1. Someone must accompany you the day of the procedure to drive you home safely, because of anesthesia. 2. You must remove any kind of makeup, nail polish, lotions, powders, creams or deodorant. 3. [place] ONLY: Pre-admission will give instruct you on when to take Gatorade and Tylenol/acetaminophen prior to your surgery, purchase 2 - 12oz bottles of regular Gatorade (NOT RED/SUGAR FREE). Otherwise, you may not eat or drink anything else after 11PM the night before surgery. 4. [place] ONLY: You may not eat or drink anything after midnight the day of your surgery. 5. Wear comfortable clothing that can be easily removed. 6. If you wear dentures, contacts lenses, or any prosthesis, you will be asked to remove them. 7. Do not drink alcohol or smoke 24 hours prior to your procedure. 8. Bring a picture ID and your insurance card. 9. You will be contacted by the hospital for Pre-Admission Covid-19 testing (regardless of vaccination status) to be scheduled as an appointment prior to surgery. They will call closer to the surgery date to set this up, because the earliest this can be done is 72 hours prior to surgery. 10.  The Pre-Admission Testing Department will call the day before to confirm your procedure, give you the time you need to arrive by and directions on where to go. They begin making calls after 2pm, if you are not contacted by 4pm, please call the surgeon's office listed above. 11. Do not take any blood thinners at least one week prior to the procedure/surgery. This includes aspirin, baby aspirin, Ibuprofen products, herbal supplements, diet medications, vitamin E, fish oil and green tea supplements. Please check other supplements for these ingredients. *TYLENOL or acetaminophen is acceptable*  12. If you take Coumadin, Plavix, Xarelto, or Eliquis, please contact your prescribing physician for special instructions on how long to hold. If you take insulin contact your primary care physician for special instructions. 15. Our surgery scheduler, Ramila Gallagher, will be contacting you to discuss surgery dates. If you have any questions related to scheduling your surgery, please reach out to her at [phone].  _____________________________________________________________________  PRE-OPERATIVE TESTING IF INDICATED BELOW  PLEASE COMPLETE ASAP (AT LEAST 7-10 DAYS PRIOR TO SURGERY)  [] CBC [x] BMP [] CMP [x] EKG    [] PT, PTT, INR [] Cardiac Clearance  [x] H&P Medical Clearance [] Chest X-ray     Please call Central Scheduling to schedule an appointment for pre-operative labs/tests @ [phone]    Does the patient have a pacemaker or ICD?      [] Yes   [x] No

## 2022-05-06 NOTE — PROGRESS NOTES
Met with patient in clinic. Introduced myself as one the breast nurse navigators and explained the role of the breast nurse navigator and coordination of care. Discussed the breast multidisciplinary conference and that her case will be discussed. Patient was given the contact information for the social workers at the [facility] and resources for support as requested. Next step in care will be to schedule breast MRI. Scheduled patient breast MRI for Thursday May 12, 2022 at 2000 at [place]. Pt was provided with breast nurse navigator contact information and was encouraged to phone with any other questions or concerns.

## 2022-05-09 ENCOUNTER — TELEPHONE (OUTPATIENT)
Dept: SURGERY | Facility: CLINIC | Age: 47
End: 2022-05-09

## 2022-05-09 NOTE — TELEPHONE ENCOUNTER
Calling pt in regards to scheduling surgery. Informed pt that I have 06/06/2022 available at [facility] with Dr. Kristen Lazo. Pt verbalized understanding and in agreement with date and location. All questions answered. Encouraged pt to call or [employer] message office with any other questions or concerns.

## 2022-05-12 ENCOUNTER — HOSPITAL ENCOUNTER (OUTPATIENT)
Dept: MRI IMAGING | Facility: HOSPITAL | Age: 47
Discharge: HOME | End: 2022-05-12
Attending: SURGERY
Payer: COMMERCIAL

## 2022-05-12 DIAGNOSIS — N63.11 MASS OF UPPER OUTER QUADRANT OF RIGHT BREAST: ICD-10-CM

## 2022-05-12 DIAGNOSIS — R92.2 INCONCLUSIVE MAMMOGRAM DUE TO DENSE BREASTS: ICD-10-CM

## 2022-05-12 PROCEDURE — 77049 MRI BREAST C-+ W/CAD BI: CPT | Performed by: SURGERY

## 2022-05-12 PROCEDURE — A9575 INJ GADOTERATE MEGLUMI 0.1ML: HCPCS | Performed by: SURGERY

## 2022-05-13 ENCOUNTER — OFFICE VISIT (OUTPATIENT)
Dept: INTERNAL MEDICINE CLINIC | Facility: CLINIC | Age: 47
End: 2022-05-13
Payer: COMMERCIAL

## 2022-05-13 ENCOUNTER — LAB ENCOUNTER (OUTPATIENT)
Dept: LAB | Age: 47
End: 2022-05-13
Attending: PHYSICIAN ASSISTANT
Payer: COMMERCIAL

## 2022-05-13 VITALS
RESPIRATION RATE: 16 BRPM | HEART RATE: 67 BPM | SYSTOLIC BLOOD PRESSURE: 100 MMHG | BODY MASS INDEX: 28.17 KG/M2 | TEMPERATURE: 99 F | WEIGHT: 159 LBS | OXYGEN SATURATION: 98 % | DIASTOLIC BLOOD PRESSURE: 62 MMHG | HEIGHT: 63 IN

## 2022-05-13 DIAGNOSIS — E11.69 HYPERLIPIDEMIA ASSOCIATED WITH TYPE 2 DIABETES MELLITUS (HCC): ICD-10-CM

## 2022-05-13 DIAGNOSIS — E78.5 HYPERLIPIDEMIA ASSOCIATED WITH TYPE 2 DIABETES MELLITUS (HCC): ICD-10-CM

## 2022-05-13 DIAGNOSIS — E10.65 TYPE 1 DIABETES MELLITUS WITH HYPERGLYCEMIA (HCC): ICD-10-CM

## 2022-05-13 DIAGNOSIS — N63.11 MASS OF UPPER OUTER QUADRANT OF RIGHT BREAST: ICD-10-CM

## 2022-05-13 DIAGNOSIS — Z00.00 LABORATORY EXAM ORDERED AS PART OF ROUTINE GENERAL MEDICAL EXAMINATION: ICD-10-CM

## 2022-05-13 DIAGNOSIS — Z01.818 PREOP EXAMINATION: Primary | ICD-10-CM

## 2022-05-13 LAB
ALBUMIN SERPL-MCNC: 3.5 G/DL (ref 3.4–5)
ALBUMIN/GLOB SERPL: 1 {RATIO} (ref 1–2)
ALP LIVER SERPL-CCNC: 68 U/L
ALT SERPL-CCNC: 25 U/L
ANION GAP SERPL CALC-SCNC: 7 MMOL/L (ref 0–18)
AST SERPL-CCNC: 16 U/L (ref 15–37)
BASOPHILS # BLD AUTO: 0.13 X10(3) UL (ref 0–0.2)
BASOPHILS NFR BLD AUTO: 2 %
BILIRUB SERPL-MCNC: 0.6 MG/DL (ref 0.1–2)
BILIRUB UR QL STRIP.AUTO: NEGATIVE
BUN BLD-MCNC: 12 MG/DL (ref 7–18)
CALCIUM BLD-MCNC: 8.9 MG/DL (ref 8.5–10.1)
CHLORIDE SERPL-SCNC: 102 MMOL/L (ref 98–112)
CHOLEST SERPL-MCNC: 196 MG/DL (ref ?–200)
CLARITY UR REFRACT.AUTO: CLEAR
CO2 SERPL-SCNC: 23 MMOL/L (ref 21–32)
COLOR UR AUTO: YELLOW
CREAT BLD-MCNC: 0.9 MG/DL
CREAT UR-SCNC: 78.1 MG/DL
EOSINOPHIL # BLD AUTO: 0.21 X10(3) UL (ref 0–0.7)
EOSINOPHIL NFR BLD AUTO: 3.2 %
ERYTHROCYTE [DISTWIDTH] IN BLOOD BY AUTOMATED COUNT: 13.3 %
EST. AVERAGE GLUCOSE BLD GHB EST-MCNC: 243 MG/DL (ref 68–126)
FASTING PATIENT LIPID ANSWER: YES
FASTING STATUS PATIENT QL REPORTED: YES
GLOBULIN PLAS-MCNC: 3.5 G/DL (ref 2.8–4.4)
GLUCOSE BLD-MCNC: 349 MG/DL (ref 70–99)
GLUCOSE UR STRIP.AUTO-MCNC: >=500 MG/DL
HBA1C MFR BLD: 10.1 % (ref ?–5.7)
HCT VFR BLD AUTO: 40.8 %
HDLC SERPL-MCNC: 70 MG/DL (ref 40–59)
HGB BLD-MCNC: 13.5 G/DL
IMM GRANULOCYTES # BLD AUTO: 0.01 X10(3) UL (ref 0–1)
IMM GRANULOCYTES NFR BLD: 0.2 %
KETONES UR STRIP.AUTO-MCNC: NEGATIVE MG/DL
LDLC SERPL CALC-MCNC: 112 MG/DL (ref ?–100)
LEUKOCYTE ESTERASE UR QL STRIP.AUTO: NEGATIVE
LYMPHOCYTES # BLD AUTO: 1.76 X10(3) UL (ref 1–4)
LYMPHOCYTES NFR BLD AUTO: 26.5 %
MCH RBC QN AUTO: 31.5 PG (ref 26–34)
MCHC RBC AUTO-ENTMCNC: 33.1 G/DL (ref 31–37)
MCV RBC AUTO: 95.1 FL
MICROALBUMIN UR-MCNC: 6.82 MG/DL
MICROALBUMIN/CREAT 24H UR-RTO: 87.3 UG/MG (ref ?–30)
MONOCYTES # BLD AUTO: 0.52 X10(3) UL (ref 0.1–1)
MONOCYTES NFR BLD AUTO: 7.8 %
NEUTROPHILS # BLD AUTO: 4.01 X10 (3) UL (ref 1.5–7.7)
NEUTROPHILS # BLD AUTO: 4.01 X10(3) UL (ref 1.5–7.7)
NEUTROPHILS NFR BLD AUTO: 60.3 %
NITRITE UR QL STRIP.AUTO: NEGATIVE
NONHDLC SERPL-MCNC: 126 MG/DL (ref ?–130)
OSMOLALITY SERPL CALC.SUM OF ELEC: 288 MOSM/KG (ref 275–295)
PH UR STRIP.AUTO: 6 [PH] (ref 5–8)
PLATELET # BLD AUTO: 347 10(3)UL (ref 150–450)
POTASSIUM SERPL-SCNC: 4.8 MMOL/L (ref 3.5–5.1)
PROT SERPL-MCNC: 7 G/DL (ref 6.4–8.2)
PROT UR STRIP.AUTO-MCNC: NEGATIVE MG/DL
RBC # BLD AUTO: 4.29 X10(6)UL
RBC UR QL AUTO: NEGATIVE
SODIUM SERPL-SCNC: 132 MMOL/L (ref 136–145)
SP GR UR STRIP.AUTO: 1.01 (ref 1–1.03)
T4 FREE SERPL-MCNC: 1.2 NG/DL (ref 0.8–1.7)
TRIGL SERPL-MCNC: 80 MG/DL (ref 30–149)
TSI SER-ACNC: 4.2 MIU/ML (ref 0.36–3.74)
UROBILINOGEN UR STRIP.AUTO-MCNC: <2 MG/DL
VLDLC SERPL CALC-MCNC: 14 MG/DL (ref 0–30)
WBC # BLD AUTO: 6.6 X10(3) UL (ref 4–11)

## 2022-05-13 PROCEDURE — 85025 COMPLETE CBC W/AUTO DIFF WBC: CPT

## 2022-05-13 PROCEDURE — 84443 ASSAY THYROID STIM HORMONE: CPT

## 2022-05-13 PROCEDURE — 93000 ELECTROCARDIOGRAM COMPLETE: CPT | Performed by: PHYSICIAN ASSISTANT

## 2022-05-13 PROCEDURE — 80053 COMPREHEN METABOLIC PANEL: CPT

## 2022-05-13 PROCEDURE — 36415 COLL VENOUS BLD VENIPUNCTURE: CPT

## 2022-05-13 PROCEDURE — 81001 URINALYSIS AUTO W/SCOPE: CPT

## 2022-05-13 PROCEDURE — 82043 UR ALBUMIN QUANTITATIVE: CPT

## 2022-05-13 PROCEDURE — 3078F DIAST BP <80 MM HG: CPT | Performed by: PHYSICIAN ASSISTANT

## 2022-05-13 PROCEDURE — 84439 ASSAY OF FREE THYROXINE: CPT

## 2022-05-13 PROCEDURE — 80061 LIPID PANEL: CPT

## 2022-05-13 PROCEDURE — 99214 OFFICE O/P EST MOD 30 MIN: CPT | Performed by: PHYSICIAN ASSISTANT

## 2022-05-13 PROCEDURE — 83036 HEMOGLOBIN GLYCOSYLATED A1C: CPT

## 2022-05-13 PROCEDURE — 82570 ASSAY OF URINE CREATININE: CPT

## 2022-05-13 PROCEDURE — 3008F BODY MASS INDEX DOCD: CPT | Performed by: PHYSICIAN ASSISTANT

## 2022-05-13 PROCEDURE — 3074F SYST BP LT 130 MM HG: CPT | Performed by: PHYSICIAN ASSISTANT

## 2022-05-13 NOTE — PATIENT INSTRUCTIONS
Have labs drawn, fasting 8-10 hours prior (water before is ok). Ultrafast heart scan: you may schedule this coronary screening test anytime, no order is needed. [facility] location:  [phone]   [email or web address]  [facility]: call to schedule CT calcium scoring  [facility]  [facility]  [phone]  [email or web address]

## 2022-05-14 PROBLEM — E10.69 HYPERLIPIDEMIA DUE TO TYPE 1 DIABETES MELLITUS  (HCC): Status: ACTIVE | Noted: 2021-07-10

## 2022-05-14 PROBLEM — E10.69 HYPERLIPIDEMIA DUE TO TYPE 1 DIABETES MELLITUS: Status: ACTIVE | Noted: 2021-07-10

## 2022-05-14 PROBLEM — E78.5 HYPERLIPIDEMIA DUE TO TYPE 1 DIABETES MELLITUS  (HCC): Status: ACTIVE | Noted: 2021-07-10

## 2022-05-14 PROBLEM — R80.9 MICROALBUMINURIA DUE TO TYPE 1 DIABETES MELLITUS  (HCC): Status: ACTIVE | Noted: 2022-05-14

## 2022-05-14 PROBLEM — E10.29 MICROALBUMINURIA DUE TO TYPE 1 DIABETES MELLITUS (HCC): Status: ACTIVE | Noted: 2022-05-14

## 2022-05-14 PROBLEM — E78.5 HYPERLIPIDEMIA DUE TO TYPE 1 DIABETES MELLITUS (HCC): Status: ACTIVE | Noted: 2021-07-10

## 2022-05-14 PROBLEM — E10.69 HYPERLIPIDEMIA DUE TO TYPE 1 DIABETES MELLITUS (HCC): Status: ACTIVE | Noted: 2021-07-10

## 2022-05-14 PROBLEM — E10.29 MICROALBUMINURIA DUE TO TYPE 1 DIABETES MELLITUS  (HCC): Status: ACTIVE | Noted: 2022-05-14

## 2022-05-14 PROBLEM — R80.9 MICROALBUMINURIA DUE TO TYPE 1 DIABETES MELLITUS (HCC): Status: ACTIVE | Noted: 2022-05-14

## 2022-05-14 PROBLEM — R80.9 MICROALBUMINURIA DUE TO TYPE 1 DIABETES MELLITUS: Status: ACTIVE | Noted: 2022-05-14

## 2022-05-14 PROBLEM — E10.29 MICROALBUMINURIA DUE TO TYPE 1 DIABETES MELLITUS: Status: ACTIVE | Noted: 2022-05-14

## 2022-05-14 PROBLEM — E78.5 HYPERLIPIDEMIA DUE TO TYPE 1 DIABETES MELLITUS: Status: ACTIVE | Noted: 2021-07-10

## 2022-05-16 NOTE — PROGRESS NOTES
Spoke to pt. Made aware of results & recommendations. Pt voiced understanding.   Pt will decide between other DM clinics, she does not want to go to THE [facility] DM clinic  Pt was out of medication for Crestor for 2 weeks but now she is on regular with taking her medication  Advised to cont taking Ramilpril  Understanding verbalized  All the pre-op paper work was faxed to preadmission

## 2022-05-16 NOTE — PROGRESS NOTES
Spoke to pt. Made aware of results & recommendations. Pt voiced understanding.   See other result note

## 2022-05-25 ENCOUNTER — NURSE NAVIGATOR ENCOUNTER (OUTPATIENT)
Dept: HEMATOLOGY/ONCOLOGY | Facility: HOSPITAL | Age: 47
End: 2022-05-25

## 2022-05-25 NOTE — PROGRESS NOTES
Called patient back in regards to her VM she left about questions she had about her upcoming lumpectomy surgery with Dr. Brooks Hernandez. Answered all questions to the best of my ability. Patient thanked me for the phone call back and assistance. Pt was provided with the breast nurse navigators contact information and was encouraged to phone with any other questions or concerns.

## 2022-06-02 RX ORDER — ASPIRIN 81 MG/1
81 TABLET ORAL DAILY
COMMUNITY
End: 2022-12-07

## 2022-06-03 ENCOUNTER — LAB ENCOUNTER (OUTPATIENT)
Dept: LAB | Facility: HOSPITAL | Age: 47
End: 2022-06-03
Attending: SURGERY
Payer: COMMERCIAL

## 2022-06-03 DIAGNOSIS — Z01.818 PRE-OP TESTING: ICD-10-CM

## 2022-06-04 LAB — SARS-COV-2 RNA RESP QL NAA+PROBE: NOT DETECTED

## 2022-06-06 ENCOUNTER — ANESTHESIA (OUTPATIENT)
Dept: SURGERY | Facility: HOSPITAL | Age: 47
End: 2022-06-06
Payer: COMMERCIAL

## 2022-06-06 ENCOUNTER — APPOINTMENT (OUTPATIENT)
Dept: MAMMOGRAPHY | Facility: HOSPITAL | Age: 47
End: 2022-06-06
Attending: SURGERY
Payer: COMMERCIAL

## 2022-06-06 ENCOUNTER — HOSPITAL ENCOUNTER (OUTPATIENT)
Facility: HOSPITAL | Age: 47
Setting detail: HOSPITAL OUTPATIENT SURGERY
Discharge: HOME | End: 2022-06-06
Attending: SURGERY | Admitting: SURGERY
Payer: COMMERCIAL

## 2022-06-06 ENCOUNTER — HOSPITAL ENCOUNTER (OUTPATIENT)
Dept: MAMMOGRAPHY | Facility: HOSPITAL | Age: 47
Discharge: HOME | End: 2022-06-06
Attending: INTERNAL MEDICINE
Payer: COMMERCIAL

## 2022-06-06 ENCOUNTER — ANESTHESIA EVENT (OUTPATIENT)
Dept: SURGERY | Facility: HOSPITAL | Age: 47
End: 2022-06-06
Payer: COMMERCIAL

## 2022-06-06 VITALS
TEMPERATURE: 98 F | WEIGHT: 157.44 LBS | DIASTOLIC BLOOD PRESSURE: 53 MMHG | SYSTOLIC BLOOD PRESSURE: 101 MMHG | HEART RATE: 83 BPM | HEIGHT: 63 IN | RESPIRATION RATE: 16 BRPM | BODY MASS INDEX: 27.89 KG/M2 | OXYGEN SATURATION: 98 %

## 2022-06-06 DIAGNOSIS — N63.11 MASS OF UPPER OUTER QUADRANT OF RIGHT BREAST: ICD-10-CM

## 2022-06-06 DIAGNOSIS — Z01.818 PRE-OP TESTING: Primary | ICD-10-CM

## 2022-06-06 LAB
B-HCG UR QL: NEGATIVE
GLUCOSE BLDC GLUCOMTR-MCNC: 192 MG/DL (ref 70–99)
GLUCOSE BLDC GLUCOMTR-MCNC: 261 MG/DL (ref 70–99)
GLUCOSE BLDC GLUCOMTR-MCNC: 377 MG/DL (ref 70–99)

## 2022-06-06 PROCEDURE — 88300 SURGICAL PATH GROSS: CPT | Performed by: SURGERY

## 2022-06-06 PROCEDURE — 82962 GLUCOSE BLOOD TEST: CPT

## 2022-06-06 PROCEDURE — 88360 TUMOR IMMUNOHISTOCHEM/MANUAL: CPT | Performed by: SURGERY

## 2022-06-06 PROCEDURE — 81025 URINE PREGNANCY TEST: CPT

## 2022-06-06 PROCEDURE — 76098 X-RAY EXAM SURGICAL SPECIMEN: CPT | Performed by: SURGERY

## 2022-06-06 PROCEDURE — 88342 IMHCHEM/IMCYTCHM 1ST ANTB: CPT | Performed by: SURGERY

## 2022-06-06 PROCEDURE — 19281 PERQ DEVICE BREAST 1ST IMAG: CPT | Performed by: SURGERY

## 2022-06-06 PROCEDURE — 88341 IMHCHEM/IMCYTCHM EA ADD ANTB: CPT | Performed by: SURGERY

## 2022-06-06 PROCEDURE — 88307 TISSUE EXAM BY PATHOLOGIST: CPT | Performed by: SURGERY

## 2022-06-06 PROCEDURE — 0HBT0ZX EXCISION OF RIGHT BREAST, OPEN APPROACH, DIAGNOSTIC: ICD-10-PCS | Performed by: SURGERY

## 2022-06-06 RX ORDER — MORPHINE SULFATE 4 MG/ML
2 INJECTION, SOLUTION INTRAMUSCULAR; INTRAVENOUS EVERY 10 MIN PRN
Status: DISCONTINUED | OUTPATIENT
Start: 2022-06-06 | End: 2022-06-06

## 2022-06-06 RX ORDER — HYDROMORPHONE HYDROCHLORIDE 1 MG/ML
0.4 INJECTION, SOLUTION INTRAMUSCULAR; INTRAVENOUS; SUBCUTANEOUS EVERY 5 MIN PRN
Status: DISCONTINUED | OUTPATIENT
Start: 2022-06-06 | End: 2022-06-06

## 2022-06-06 RX ORDER — NALOXONE HYDROCHLORIDE 0.4 MG/ML
80 INJECTION, SOLUTION INTRAMUSCULAR; INTRAVENOUS; SUBCUTANEOUS AS NEEDED
Status: DISCONTINUED | OUTPATIENT
Start: 2022-06-06 | End: 2022-06-06

## 2022-06-06 RX ORDER — ONDANSETRON 2 MG/ML
4 INJECTION INTRAMUSCULAR; INTRAVENOUS EVERY 6 HOURS PRN
Status: DISCONTINUED | OUTPATIENT
Start: 2022-06-06 | End: 2022-06-06

## 2022-06-06 RX ORDER — MORPHINE SULFATE 4 MG/ML
4 INJECTION, SOLUTION INTRAMUSCULAR; INTRAVENOUS EVERY 10 MIN PRN
Status: DISCONTINUED | OUTPATIENT
Start: 2022-06-06 | End: 2022-06-06

## 2022-06-06 RX ORDER — SODIUM CHLORIDE, SODIUM LACTATE, POTASSIUM CHLORIDE, CALCIUM CHLORIDE 600; 310; 30; 20 MG/100ML; MG/100ML; MG/100ML; MG/100ML
INJECTION, SOLUTION INTRAVENOUS CONTINUOUS
Status: DISCONTINUED | OUTPATIENT
Start: 2022-06-06 | End: 2022-06-06

## 2022-06-06 RX ORDER — DEXTROSE MONOHYDRATE 25 G/50ML
50 INJECTION, SOLUTION INTRAVENOUS
Status: DISCONTINUED | OUTPATIENT
Start: 2022-06-06 | End: 2022-06-06

## 2022-06-06 RX ORDER — HYDROMORPHONE HYDROCHLORIDE 1 MG/ML
0.6 INJECTION, SOLUTION INTRAMUSCULAR; INTRAVENOUS; SUBCUTANEOUS EVERY 5 MIN PRN
Status: DISCONTINUED | OUTPATIENT
Start: 2022-06-06 | End: 2022-06-06

## 2022-06-06 RX ORDER — LIDOCAINE HYDROCHLORIDE AND EPINEPHRINE 10; 10 MG/ML; UG/ML
INJECTION, SOLUTION INFILTRATION; PERINEURAL AS NEEDED
Status: DISCONTINUED | OUTPATIENT
Start: 2022-06-06 | End: 2022-06-06

## 2022-06-06 RX ORDER — HYDROMORPHONE HYDROCHLORIDE 1 MG/ML
0.2 INJECTION, SOLUTION INTRAMUSCULAR; INTRAVENOUS; SUBCUTANEOUS EVERY 5 MIN PRN
Status: DISCONTINUED | OUTPATIENT
Start: 2022-06-06 | End: 2022-06-06

## 2022-06-06 RX ORDER — CEFAZOLIN SODIUM/WATER 2 G/20 ML
2 SYRINGE (ML) INTRAVENOUS ONCE
Status: COMPLETED | OUTPATIENT
Start: 2022-06-06 | End: 2022-06-06

## 2022-06-06 RX ORDER — NICOTINE POLACRILEX 4 MG
15 LOZENGE BUCCAL
Status: DISCONTINUED | OUTPATIENT
Start: 2022-06-06 | End: 2022-06-06

## 2022-06-06 RX ORDER — ONDANSETRON 2 MG/ML
INJECTION INTRAMUSCULAR; INTRAVENOUS AS NEEDED
Status: DISCONTINUED | OUTPATIENT
Start: 2022-06-06 | End: 2022-06-06 | Stop reason: SURG

## 2022-06-06 RX ORDER — ACETAMINOPHEN 500 MG
1000 TABLET ORAL ONCE
Status: COMPLETED | OUTPATIENT
Start: 2022-06-06 | End: 2022-06-06

## 2022-06-06 RX ORDER — NICOTINE POLACRILEX 4 MG
30 LOZENGE BUCCAL
Status: DISCONTINUED | OUTPATIENT
Start: 2022-06-06 | End: 2022-06-06

## 2022-06-06 RX ORDER — HYDROCODONE BITARTRATE AND ACETAMINOPHEN 5; 325 MG/1; MG/1
1-2 TABLET ORAL EVERY 6 HOURS PRN
Qty: 20 TABLET | Refills: 0 | Status: SHIPPED | OUTPATIENT
Start: 2022-06-06 | End: 2022-07-14

## 2022-06-06 RX ORDER — PROCHLORPERAZINE EDISYLATE 5 MG/ML
5 INJECTION INTRAMUSCULAR; INTRAVENOUS EVERY 8 HOURS PRN
Status: DISCONTINUED | OUTPATIENT
Start: 2022-06-06 | End: 2022-06-06

## 2022-06-06 RX ORDER — MORPHINE SULFATE 10 MG/ML
6 INJECTION, SOLUTION INTRAMUSCULAR; INTRAVENOUS EVERY 10 MIN PRN
Status: DISCONTINUED | OUTPATIENT
Start: 2022-06-06 | End: 2022-06-06

## 2022-06-06 RX ORDER — BUPIVACAINE HYDROCHLORIDE 5 MG/ML
INJECTION, SOLUTION EPIDURAL; INTRACAUDAL AS NEEDED
Status: DISCONTINUED | OUTPATIENT
Start: 2022-06-06 | End: 2022-06-06

## 2022-06-06 RX ADMIN — CEFAZOLIN SODIUM/WATER 2 G: 2 G/20 ML SYRINGE (ML) INTRAVENOUS at 08:10:00

## 2022-06-06 RX ADMIN — SODIUM CHLORIDE, SODIUM LACTATE, POTASSIUM CHLORIDE, CALCIUM CHLORIDE: 600; 310; 30; 20 INJECTION, SOLUTION INTRAVENOUS at 08:43:00

## 2022-06-06 RX ADMIN — ONDANSETRON 4 MG: 2 INJECTION INTRAMUSCULAR; INTRAVENOUS at 08:20:00

## 2022-06-06 NOTE — BRIEF OP NOTE
Pre-Operative Diagnosis: Mass of upper outer quadrant of right breast [N63.11]     Post-Operative Diagnosis: Mass of upper outer quadrant of right breast [N63.11]      Procedure Performed:     Right breast wire localized excisional biopsy    Surgeon(s) and Role:     Arias Pedraza MD - Primary    Assistant(s):  Surgical Assistant.: Margarita Macias CSA     Surgical Findings: Clip in xray     Specimen: R lumepectomy     Estimated Blood Loss: Nona Aase, MD  6/6/2022  8:30 AM

## 2022-06-06 NOTE — OR PREOP
Pt. Returned from localization via w/c. No complaints of pain or discomfort. Blood sugar checked and is 261. Pt. States that number is exactly where per primary care doctor wants it going into surgery. Dr. Chemo Brown here and aware of blood sugar results. States for patient to remain off insulin pump for duration of surgical procedure. Pt. aware and insulin pump remains off.

## 2022-06-06 NOTE — IMAGING NOTE
2524 Pt  to ultrasound [facility] department scouts completed by  [facility] [place] [facility]  mammography technologist     1047 Hx taken procedure explained questions answered. Iv patent no redness or swelling noted at site with 950  ml noted in bag    0711  Consent signed and verified      [id number] Dr Maddison Blanchard here scanning completed Order verified and signed by all  procedural staff members    1746 Time out taken       [id number] site marked RIGHT  Breast    0716  Chloro prep as skin prep to site. Lidocaine   1% 10 milligrams per ml given as anesthetic affect from kit  3 ml total given. 9722 Spark needle  20 gauge  X 5 cm   placed . Pt re  images procedure complete. 8342 BB marker to site wire secured to breast  strips. A 4x4 dsg secured  over wire with tape by nurse  [facility] RN  after images completed . 2918. Report to  [facility]  ambulatory RN .  Iv patent no redness or swelling noted at site with 950 ml noted in bag.    0745 To AMBULATORY SURGERY  department WITH TRANSPORT

## 2022-06-06 NOTE — ANESTHESIA POSTPROCEDURE EVALUATION
Patient: Colletta Howells    Procedure Summary     Date: 06/06/22 Room / Location: [facility] OR [address] [place] [address] OR    Anesthesia Start: 0803 Anesthesia Stop: 7051    Procedure: Right breast wire localized excisional biopsy (Right Breast) Diagnosis:       Mass of upper outer quadrant of right breast      (Mass of upper outer quadrant of right breast [N63.11])    Surgeons: Mikal Castañeda MD Anesthesiologist: Jj Iniguez MD    Anesthesia Type: MAC ASA Status: 3          Anesthesia Type: MAC    Vitals Value Taken Time   /56 06/06/22 0844   Temp 97.2 06/06/22 0844   Pulse 90 06/06/22 0844   Resp 13 06/06/22 0844   SpO2 95 % 06/06/22 0844   Vitals shown include unvalidated device data.     [facility] Post Evaluation:   Patient Evaluated in PACU  Patient Participation: complete - patient participated  Level of Consciousness: awake and alert  Pain Score: 0  Pain Management: adequate  Airway Patency:patent  Dental exam unchanged from preop  Yes    Cardiovascular Status: acceptable  Respiratory Status: acceptable  Postoperative Hydration acceptable      Calvin Sotelo CRNA  6/6/2022 8:44 AM

## 2022-06-07 NOTE — PROCEDURES
[facility]  Procedure Note    Catina Mccann Patient Status:  Outpatient    10/21/1975 MRN [id number]   [facility] [address] [facility] Attending Kendrick Gonzalez MD   Hosp Day # 0 PCP Michael Pastor MD     Procedure: mammo guided wire localization of the right breast    Pre-Procedure Diagnosis:  Right breast central lateral clip    Post-Procedure Diagnosis: Right breast central lateral clip    Anesthesia:  Local    Findings:  Right breast central lateral clip    Specimens: loc    Blood Loss:  minimal    Tourniquet Time: none  Complications:  None  Drains:  None    [place] Darden,   2022

## 2022-06-07 NOTE — OPERATIVE REPORT
Clifton Yin    PATIENT'S NAME: Charlene Ireland   ATTENDING PHYSICIAN: Delmar Jeffers. Jammie Pat MD   OPERATING PHYSICIAN: Delmar Jeffers. Jammie Pat MD   PATIENT ACCOUNT#:   [id number]    LOCATION:  [facility] [address] [facility] 10  MEDICAL RECORD #:   [id number]       YOB: 1975  ADMISSION DATE:       06/06/2022      OPERATION DATE:  06/06/2022    OPERATIVE REPORT    PREOPERATIVE DIAGNOSIS:  Mass of the right breast.  POSTOPERATIVE DIAGNOSIS:  Mass of the right breast.  PROCEDURE:  Right breast wire localized lumpectomy with right breast specimen radiography. ASSISTANT:  Eli Salgado CSA. ANESTHESIA:  Monitored anesthesia care and local.    ESTIMATED BLOOD LOSS:  5 mL. DRAINS:  None. COMPLICATIONS:  None. DISPOSITION:  Stable on transfer to recovery room. INDICATIONS:  The patient is a 81-year-old female who presented with a self-detected change in the right breast.  She had imaging workup that confirmed a suspicious mass and had a biopsy that confirmed necrotic tissue that could not be distinguished between the necrotic neoplasm and her benign fat necrosis for which surgical excision was recommended. Risks and possible complications were discussed with the patient including, but not limited to, infection, bleeding, injury to surrounding structures, and possible need for reoperation. She agreed to the proposed surgery. OPERATIVE TECHNIQUE:  Patient was brought to the imaging suite. She underwent a wire localization of the area of concern in the right breast.  She was brought to the OR, placed in supine position, properly padded and secured, given a dose of IV antibiotics, and sequential compression devices were applied to her legs for DVT prophylaxis. Monitored anesthesia care was induced, and the right breast was prepped and draped in usual sterile fashion. Then, 1% lidocaine with epinephrine was used to infiltrate the skin and subcutaneous tissue at the targeted incision site.   A curvilinear incision was made along the lateral areolar border with a 15 blade knife in the skin. Her wire was identified, brought into the field. Using sharp dissection and electrocautery, it was carefully oriented with a short stitch, single clip superiorly and long stitch, double clip laterally in order to allow for appropriate pathological margin. The specimen in review was then placed in the imaging device where specimen x-ray confirmed the presence of the targeted clip, residual distortion with adequate margins as deemed by myself. A clip was then placed back within the cavity to assist with subsequent surveillance. The wound was irrigated. Hemostasis assured with electrocautery. Closure was accomplished with a running 3-0 PDS for deep layer followed by interrupted 3-0 Vicryl for deep layer of the skin and a 4-0 subcuticular Monocryl for skin itself. Mastisol and Steri-Strips were applied. Then, 0.5% Marcaine was instilled in the cavity to assist with postoperative analgesia. A sterile dressing and compression bra were placed. Her blood loss was minimal.  All counts were correct at the conclusion of the procedure. She tolerated the procedure well, and she was transferred to the recovery area in stable condition. Dictated By Genia Beckett.  Taylor Garcia MD  d: 06/06/2022 08:37:51  t: 06/06/2022 18:13:04  [facility] [id number]  G/    cc: DEMETRIS Rondon

## 2022-06-08 ENCOUNTER — TELEPHONE (OUTPATIENT)
Dept: SURGERY | Facility: CLINIC | Age: 47
End: 2022-06-08

## 2022-06-08 NOTE — TELEPHONE ENCOUNTER
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 She is on close surgical margin for DCIS with negative margins for the microinvasive disease. Plan to discuss her case at multidisciplinary tumor board next Tuesday and see patient following her further recommendations. We will coordinate patient with genetic testing and offer her additional treatment options at that visit. All questions answered the best my ability.

## 2022-06-14 ENCOUNTER — OFFICE VISIT (OUTPATIENT)
Dept: SURGERY | Facility: CLINIC | Age: 47
End: 2022-06-14
Payer: COMMERCIAL

## 2022-06-14 ENCOUNTER — NURSE NAVIGATOR ENCOUNTER (OUTPATIENT)
Dept: HEMATOLOGY/ONCOLOGY | Facility: HOSPITAL | Age: 47
End: 2022-06-14

## 2022-06-14 ENCOUNTER — NURSE ONLY (OUTPATIENT)
Dept: HEMATOLOGY/ONCOLOGY | Facility: HOSPITAL | Age: 47
End: 2022-06-14
Payer: COMMERCIAL

## 2022-06-14 ENCOUNTER — GENETICS ENCOUNTER (OUTPATIENT)
Dept: GENETICS | Facility: HOSPITAL | Age: 47
End: 2022-06-14
Payer: COMMERCIAL

## 2022-06-14 VITALS
HEIGHT: 63 IN | HEART RATE: 87 BPM | WEIGHT: 155.81 LBS | SYSTOLIC BLOOD PRESSURE: 121 MMHG | RESPIRATION RATE: 16 BRPM | OXYGEN SATURATION: 99 % | DIASTOLIC BLOOD PRESSURE: 77 MMHG | BODY MASS INDEX: 27.61 KG/M2 | TEMPERATURE: 98 F

## 2022-06-14 DIAGNOSIS — Z01.818 PRE-OP EXAM: ICD-10-CM

## 2022-06-14 DIAGNOSIS — Z01.818 PRE-OP TESTING: ICD-10-CM

## 2022-06-14 DIAGNOSIS — D05.11 DUCTAL CARCINOMA IN SITU (DCIS) OF RIGHT BREAST: Primary | ICD-10-CM

## 2022-06-14 DIAGNOSIS — D05.10: Primary | ICD-10-CM

## 2022-06-14 DIAGNOSIS — Z80.9 FAMILY HISTORY OF CANCER: ICD-10-CM

## 2022-06-14 DIAGNOSIS — Z80.49: ICD-10-CM

## 2022-06-14 PROCEDURE — 96040 HC GENETIC COUNSELING EA 30 MIN: CPT

## 2022-06-14 PROCEDURE — 3008F BODY MASS INDEX DOCD: CPT | Performed by: SURGERY

## 2022-06-14 PROCEDURE — 99211 OFF/OP EST MAY X REQ PHY/QHP: CPT

## 2022-06-14 PROCEDURE — 3074F SYST BP LT 130 MM HG: CPT | Performed by: SURGERY

## 2022-06-14 PROCEDURE — 3078F DIAST BP <80 MM HG: CPT | Performed by: SURGERY

## 2022-06-14 PROCEDURE — 36415 COLL VENOUS BLD VENIPUNCTURE: CPT

## 2022-06-14 PROCEDURE — 99024 POSTOP FOLLOW-UP VISIT: CPT | Performed by: SURGERY

## 2022-06-14 NOTE — PATIENT INSTRUCTIONS
Dr. 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 Radiology Instructions: N/A   _______________________________________________________________________________    1. Someone must accompany you the day of the procedure to drive you home safely, because of anesthesia. 2. You must remove any kind of makeup, nail polish, lotions, powders, creams or deodorant. 3. [place] ONLY: Pre-admission will give instruct you on when to take Gatorade and Tylenol/acetaminophen prior to your surgery, purchase 2 - 12oz bottles of regular Gatorade (NOT RED/SUGAR FREE). Otherwise, you may not eat or drink anything else after 11PM the night before surgery. 4. [place] ONLY: You may not eat or drink anything after midnight the day of your surgery. 5. Wear comfortable clothing that can be easily removed. 6. If you wear dentures, contacts lenses, or any prosthesis, you will be asked to remove them. 7. Do not drink alcohol or smoke 24 hours prior to your procedure. 8. Bring a picture ID and your insurance card. 9. You will be contacted by the hospital for Pre-Admission Covid-19 testing (regardless of vaccination status) to be scheduled as an appointment prior to surgery. They will call closer to the surgery date to set this up, because the earliest this can be done is 72 hours prior to surgery. 10.  The Pre-Admission Testing Department will call the day before to confirm your procedure, give you the time you need to arrive by and directions on where to go. They begin making calls after 2pm, if you are not contacted by 4pm, please call the surgeon's office listed above. 11. Do not take any blood thinners at least one week prior to the procedure/surgery. This includes aspirin, baby aspirin, Ibuprofen products, herbal supplements, diet medications, vitamin E, fish oil and green tea supplements. Please check other supplements for these ingredients. *TYLENOL or acetaminophen is acceptable*  12. If you take Coumadin, Plavix, Xarelto, or Eliquis, please contact your prescribing physician for special instructions on how long to hold. If you take insulin contact your primary care physician for special instructions. 15. Our surgery scheduler, Serjio Ennis, will be contacting you to discuss surgery dates. If you have any questions related to scheduling your surgery, please reach out to her at [phone].  _____________________________________________________________________  PRE-OPERATIVE TESTING IF INDICATED BELOW  PLEASE COMPLETE ASAP (AT LEAST 7-10 DAYS PRIOR TO SURGERY)  [x] CBC [] BMP [x] CMP [x] EKG (done 5/13/22)   [] PT, PTT, INR [] Cardiac Clearance  [x] H&P Medical Clearance [] Chest X-ray     Please call Central Scheduling to schedule an appointment for pre-operative labs/tests @ [phone]    Does the patient have a pacemaker or ICD?      [] Yes   [x] No

## 2022-06-14 NOTE — PROGRESS NOTES
Patient Name: Melissa Rosario  YOB: 1975  Date of Visit: 2022    Reason for visit: Ms. Jose Parsons was seen for the purposes of genetic counseling due to her recent diagnosis of DCIS at age 40y. The purpose of this visit was to review information regarding genetic testing options for mutations in high penetrance cancer susceptibility genes. Referring Provider: Taniya Good MD    Medical History: Ms. Jose Parsons is a pleasant 40year old female presenting with DCIS with microinvasion (ER+) of the right breast dx on 2022 on an excisional bx after an initial breast bx was done on 2022 with necrotic tissues and a proliferating process was noted. The margins of the excisional bx showing DCIS were clear, but close, re-excision was recommended. Ms. Jose Parsons stated she is planning on moving forward with a bilateral mastectomy regardless of any genetic testing results. She denies any other cancer history. She retains her uterus, ovaries, and fallopian tubes. Her last pap/pelvic exam was in 3/11/2021 and was normal. She has had a colonoscopy on 2021 which removed one SSA, repeat in 1y was advised. Ms. Jose Parsons achieved menarche at approximately 14years of age, is pre-menopausal, and has never been pregnant. Ms. Jose Parsons has a 0-year history of oral contraceptive use and denies any fertility or hormone replacement use. Relevant Family History: Ms. Jose Parsons has 2 brothers (46y, 54y) with no cancers. Her mother (69y) has no cancers. She has 1 maternal uncle ([de-identified]) with no cancers. Her maternal grandmother  at 95F dt complications from dementia with no cancers. Her maternal grandfather  at ~80y dt a MI with no cancers, his mother  >53y dt a gynecological cancer, reportedly a possible ovarian cancer. Ms. Krupa Joy father (52H) has no cancer history.  She has 1 paternal aunt (69y with no cancers) and 2 paternal uncles (1  at ~69y with a h/o a possible stomach cancer/tumor reported tx with radiation, he had a h/o EtOH and smoking; 1 is living at 85y with no cancers, he has 3 sons, 2 have a h/o skin cancer). Her paternal grandmother  ~60D dt complications from brain cancer/tumor dx ~69y. Her paternal grandfather  ~63y dt MI with no cancers. The rest of the family history is negative for other significant genetic conditions, cancers, or birth defects of any kind. See scanned pedigree for full family history reported during the session. Ms. Lucrecia Cintron maternal ethnicity is Citizen of the [place]ian/English and her paternal ethnicity is Taj[place]/Chinese. She is unaware of any Ashkenazi Anglican heritage. Summary: I reviewed information at length with Ms. Temitope Menchaca regarding hereditary breast cancer. The incidence of breast cancer in the general population is estimated to be 1 in 6 women. Roughly 5-10% of breast will be the result of heritable genetic factors. Hereditary tumors demonstrate certain characteristics such that they tend to have an earlier age of onset, involve multiple sites, demonstrate bilateral involvement, and recur in families. Additionally, triple negative breast cancer is more likely to me associated with a hereditary predisposition to breast cancer than other types. Mutations in 2 genes, BRCA1 and BRCA2, have been identified in 25-50% of hereditary breast and ovarian cancer families. BRCA1 is thought to be responsible for approximately 20% of families with hereditary breast cancer and 55% of families with hereditary ovarian cancer. Mutations in BRCA1 increase risks for male breast cancer, prostate cancer, and pancreatic as well. This gene is acts as a tumor suppressor gene. If at least one BRCA1 gene is functioning normally, an individual will be prevented from developing malignant changes in a particular cell. However, if both tumor suppressor genes in a cell are inactive, that particular cell will undergo malignant transformation.   In most individuals, mutations need to occur in both tumor suppressor genes of a particular cell during the course of their lifetime in order for malignant transformation to occur. In individuals with hereditary breast or other related cancers, one of the tumor suppressor genes already carries a mutation which renders it nonfunctional at the birth of that individual.  As a result, only one other mutation is required to occur during the lifetime of this individual in order for a particular cell to have no functional tumor suppressor genes and for malignant transformation to occur. BRCA2 is thought to be responsible for roughly 10% of all inherited breast cancers and almost 70% of cases of male hereditary breast cancer. It is also thought to be responsible for increasing risks for ovarian cancer but not to the same extent as mutations in BRCA1 can. Individuals carrying mutations in either BRCA1 or BRCA2 are thought to have as great as a 72% risk for developing breast cancer during their lifetime and up to a 44% risk for developing ovarian cancer [employer] et al, 2017). The risk for prostate cancer may be 3 times as great as the general population with up to a 7% risk for male breast cancer and an increased risk for pancreatic cancer and melanoma. Direct gene analysis of both BRCA1 and BRCA2 is available for some families at risk for hereditary breast and ovarian cancers. For those individuals for whom such testing is indicated, the gene's coding regions will be analyzed with 90-95% accuracy in identifying a mutation. Other genes that have been identified to be responsible for breast cancer include EDA, BRIP1, CHEK2, PALB2, PTEN, RAD50, and TP53. These other genes account for a smaller percentage of hereditary breast cancers. If testing is performed, three results are possible: positive, negative, and variant of uncertain significance.   A positive result indicates a mutation has been identified, and there is an increased risk for the cancers associated with the specific gene. Since mutations in most cancer susceptibility genes are inherited in an autosomal dominant fashion, siblings and children of individuals with a mutation have a 50% risk of carrying the mutation as well. A negative test result would indicate that no mutation was identified in a cancer susceptibility gene. While testing detects gene mutations, it is possible for a mutation to be present and go undetected. It is also possible for a mutation to be located in a gene other than those being tested. A variant of uncertain significance means that a change has been identified a cancer susceptibility gene; however, it is uncertain if the variant is pathogenic or a non-deleterious change. With time, the variant may be reclassified as either positive or negative. Since mutation identification is necessary, an affected family member is preferred in order to confirm that a mutation is indeed present in a particular family. If the mutation can be identified in an affected individual, this information can be used to screen other at-risk individuals in the family. Individuals who are positive for the same mutation would be expected to have a much greater risk for developing hereditary cancer during their lifetime than the general population and surveillance and management would be rigorous. For those individuals who are found to not carry the mutation, risks for developing cancer during their lifetime would return to those expected for individuals in the general population. It should be emphasized that absence of a mutation in an at-risk individual would not eliminate their risk for cancer, but simply return them to the risk expected for the general population.  If no affected individual is available for testing, a negative result, while reassuring, cannot be completely informative of the familial cancer risk as it is unknown whether no mutation was found because the individual tested is truly negative for the familial mutation or whether the familial mutation was unable to be detected by the genetic testing method used. In such cases, screening and follow-up should be guided by personal and family history. Because Ms. Charles Fair was diagnosed with DCIS at 40y with a family history of a gynecological cancer, reportedly ovarian cancer, in her maternal great-grandmother, genetic testing for mutations in high-penetrance breast and related cancer susceptibility genes is indicated based on the NCCN guidelines ([address] V.2.2022). Ms. Charles Fair appeared to understand the information presented. On the day of the visit Ms. Charles Fair elected to proceed with genetic testing for hereditary cancer and the breast and gyn cancers panel was ordered. Blood was drawn and sent to [facility] with an estimated turn-around-time of 2-3 weeks. My office will call Ms. Charles Fair as soon as results are received; post-test counseling can be scheduled at that time. Thank you for allowing me to participate in the care of your patient; please do not hesitate to contact my office if you have any questions or concerns, [phone]. Plan:   1. Blood was drawn and sent out for [employer]'s breast and gyn cancers panel (36 genes; TAT: 2-3 weeks). 2. The Genetics office will call Ms. Charles Fair when results are available. 3. Recommendations for Ms. Charles Fair and family members will depend the above genetic testing results.       Send to: [place]  Time spent with patient: 35 minutes

## 2022-06-17 ENCOUNTER — LAB ENCOUNTER (OUTPATIENT)
Dept: LAB | Age: 47
End: 2022-06-17
Payer: COMMERCIAL

## 2022-06-17 ENCOUNTER — OFFICE VISIT (OUTPATIENT)
Dept: SURGERY | Facility: CLINIC | Age: 47
End: 2022-06-17
Payer: COMMERCIAL

## 2022-06-17 ENCOUNTER — TELEPHONE (OUTPATIENT)
Dept: SURGERY | Facility: CLINIC | Age: 47
End: 2022-06-17

## 2022-06-17 VITALS
BODY MASS INDEX: 27.46 KG/M2 | SYSTOLIC BLOOD PRESSURE: 122 MMHG | RESPIRATION RATE: 16 BRPM | WEIGHT: 155 LBS | HEIGHT: 63 IN | OXYGEN SATURATION: 98 % | HEART RATE: 94 BPM | DIASTOLIC BLOOD PRESSURE: 78 MMHG

## 2022-06-17 DIAGNOSIS — D05.11 DUCTAL CARCINOMA IN SITU (DCIS) OF RIGHT BREAST: Primary | ICD-10-CM

## 2022-06-17 DIAGNOSIS — Z01.818 PRE-OP TESTING: ICD-10-CM

## 2022-06-17 LAB
ALBUMIN SERPL-MCNC: 3.9 G/DL (ref 3.4–5)
ALBUMIN/GLOB SERPL: 1.1 {RATIO} (ref 1–2)
ALP LIVER SERPL-CCNC: 70 U/L
ALT SERPL-CCNC: 34 U/L
ANION GAP SERPL CALC-SCNC: 8 MMOL/L (ref 0–18)
AST SERPL-CCNC: 24 U/L (ref 15–37)
BASOPHILS # BLD AUTO: 0.12 X10(3) UL (ref 0–0.2)
BASOPHILS NFR BLD AUTO: 1.8 %
BILIRUB SERPL-MCNC: 0.5 MG/DL (ref 0.1–2)
BUN BLD-MCNC: 7 MG/DL (ref 7–18)
CALCIUM BLD-MCNC: 9.4 MG/DL (ref 8.5–10.1)
CHLORIDE SERPL-SCNC: 106 MMOL/L (ref 98–112)
CO2 SERPL-SCNC: 26 MMOL/L (ref 21–32)
CREAT BLD-MCNC: 0.79 MG/DL
EOSINOPHIL # BLD AUTO: 0.3 X10(3) UL (ref 0–0.7)
EOSINOPHIL NFR BLD AUTO: 4.4 %
ERYTHROCYTE [DISTWIDTH] IN BLOOD BY AUTOMATED COUNT: 12.5 %
FASTING STATUS PATIENT QL REPORTED: NO
GLOBULIN PLAS-MCNC: 3.6 G/DL (ref 2.8–4.4)
GLUCOSE BLD-MCNC: 164 MG/DL (ref 70–99)
HCT VFR BLD AUTO: 40.9 %
HGB BLD-MCNC: 13.4 G/DL
IMM GRANULOCYTES # BLD AUTO: 0.01 X10(3) UL (ref 0–1)
IMM GRANULOCYTES NFR BLD: 0.1 %
LYMPHOCYTES # BLD AUTO: 2.22 X10(3) UL (ref 1–4)
LYMPHOCYTES NFR BLD AUTO: 32.6 %
MCH RBC QN AUTO: 30.9 PG (ref 26–34)
MCHC RBC AUTO-ENTMCNC: 32.8 G/DL (ref 31–37)
MCV RBC AUTO: 94.5 FL
MONOCYTES # BLD AUTO: 0.5 X10(3) UL (ref 0.1–1)
MONOCYTES NFR BLD AUTO: 7.3 %
NEUTROPHILS # BLD AUTO: 3.66 X10 (3) UL (ref 1.5–7.7)
NEUTROPHILS # BLD AUTO: 3.66 X10(3) UL (ref 1.5–7.7)
NEUTROPHILS NFR BLD AUTO: 53.8 %
OSMOLALITY SERPL CALC.SUM OF ELEC: 292 MOSM/KG (ref 275–295)
PLATELET # BLD AUTO: 332 10(3)UL (ref 150–450)
POTASSIUM SERPL-SCNC: 3.9 MMOL/L (ref 3.5–5.1)
PROT SERPL-MCNC: 7.5 G/DL (ref 6.4–8.2)
RBC # BLD AUTO: 4.33 X10(6)UL
SODIUM SERPL-SCNC: 140 MMOL/L (ref 136–145)
WBC # BLD AUTO: 6.8 X10(3) UL (ref 4–11)

## 2022-06-17 PROCEDURE — 85025 COMPLETE CBC W/AUTO DIFF WBC: CPT

## 2022-06-17 PROCEDURE — 36415 COLL VENOUS BLD VENIPUNCTURE: CPT

## 2022-06-17 PROCEDURE — 80053 COMPREHEN METABOLIC PANEL: CPT

## 2022-06-17 NOTE — TELEPHONE ENCOUNTER
Calling pt in regards to scheduling surgery. Informed pt that I have 07/13/2022 available at [facility] with Dr. Deirdre Delong. Pt verbalized understanding and in agreement with date and location. All questions answered. Encouraged pt to call or [employer] message office with any other questions or concerns.

## 2022-06-20 ENCOUNTER — NURSE NAVIGATOR ENCOUNTER (OUTPATIENT)
Dept: HEMATOLOGY/ONCOLOGY | Facility: HOSPITAL | Age: 47
End: 2022-06-20

## 2022-06-20 ENCOUNTER — OFFICE VISIT (OUTPATIENT)
Dept: INTERNAL MEDICINE CLINIC | Facility: CLINIC | Age: 47
End: 2022-06-20
Payer: COMMERCIAL

## 2022-06-20 VITALS
HEIGHT: 63 IN | OXYGEN SATURATION: 99 % | SYSTOLIC BLOOD PRESSURE: 128 MMHG | HEART RATE: 88 BPM | TEMPERATURE: 98 F | WEIGHT: 155 LBS | RESPIRATION RATE: 16 BRPM | DIASTOLIC BLOOD PRESSURE: 72 MMHG | BODY MASS INDEX: 27.46 KG/M2

## 2022-06-20 DIAGNOSIS — C50.911 MALIGNANT NEOPLASM OF RIGHT FEMALE BREAST, UNSPECIFIED ESTROGEN RECEPTOR STATUS, UNSPECIFIED SITE OF BREAST (HCC): ICD-10-CM

## 2022-06-20 DIAGNOSIS — E10.65 TYPE 1 DIABETES MELLITUS WITH HYPERGLYCEMIA (HCC): ICD-10-CM

## 2022-06-20 DIAGNOSIS — Z01.818 PREOP EXAM FOR INTERNAL MEDICINE: Primary | ICD-10-CM

## 2022-06-20 PROCEDURE — 99214 OFFICE O/P EST MOD 30 MIN: CPT | Performed by: NURSE PRACTITIONER

## 2022-06-20 PROCEDURE — 3008F BODY MASS INDEX DOCD: CPT | Performed by: NURSE PRACTITIONER

## 2022-06-20 PROCEDURE — 3078F DIAST BP <80 MM HG: CPT | Performed by: NURSE PRACTITIONER

## 2022-06-20 PROCEDURE — 3074F SYST BP LT 130 MM HG: CPT | Performed by: NURSE PRACTITIONER

## 2022-06-22 DIAGNOSIS — D05.11 DUCTAL CARCINOMA IN SITU (DCIS) OF RIGHT BREAST: Primary | ICD-10-CM

## 2022-06-23 ENCOUNTER — GENETICS ENCOUNTER (OUTPATIENT)
Dept: HEMATOLOGY/ONCOLOGY | Facility: HOSPITAL | Age: 47
End: 2022-06-23

## 2022-06-23 DIAGNOSIS — Z13.71 BRCA GENE MUTATION NEGATIVE IN FEMALE: Primary | ICD-10-CM

## 2022-06-23 NOTE — PROGRESS NOTES
Patient Name: Mora Bhakta  YOB: 1975    Referring Provider:  Landry Knutson MD     Reason for Referral:  Ms. Silke Multani had genetic testing performed on 6/14/2022 because of a diagnosis of DCIS at age 40y. Genetic Testing Result:  Negative. No pathogenic variant was found in the following 36 genes on the [facility] breast and gyn cancers panel: ABRAXAS1, AKT1, EDA*, BARD1, BRCA1, BRCA2, BRIP1, CDC73, CDH1, CHEK2, DICER1*, EPCAM*, FANCC, FANCM, MLH1*, MRE11, MSH2*, MSH6*, MUTYH, NBN, NF1*, PALB2, PIK3CA, PMS2*, POLD1*, PTEN*, RAD51C, RAD51D, RECQL*, RINT1, SDHB, SDHD, SMARCA4, STK11, TP53, XRCC2. Please refer to the report from [facility] for additional testing information. These results were discussed with Ms. Silke Multani via telephone on 6/23/2022. Summary and Plan:   These results indicate it is unlikely that Ms. Silke Multani has a pathogenic variant (harmful genetic mutation) in any of the genes listed above. No pathogenic variants associated with hereditary breast or related cancer syndromes were identified. The etiology Ms. Martinez's personal and family history of cancer remains unexplained. Management and surveillance for Ms. Silke Multani and other family members should be based on their personal and family history, with screening for cancers beginning 10 years younger than the earliest age at diagnosis if it would push screening to start at an earlier age than recommended for the general population. All medical management decisions should be made with a physician. The limitations of the testing were discussed with Ms. Silke Multani including the chance that a pathogenic variant in a gene other than those included in this analysis might be the cause of cancer in Ms. Silke Multani or in relatives. I encouraged Ms. Silke Multani to share the genetic test results with her relatives so that they may discuss the implications of this information with their health providers.  Ms. Silke Multani is also encouraged to contact me on an annual basis to learn if there have been any updates in genetic testing that would apply or if there are changes in the personal and/or family history. Please do not hesitate to contact my office if you have any questions or concerns, [phone].      Marie Young MS, CGC

## 2022-06-24 ENCOUNTER — TELEPHONE (OUTPATIENT)
Dept: GENERAL RADIOLOGY | Facility: HOSPITAL | Age: 47
End: 2022-06-24

## 2022-06-24 NOTE — TELEPHONE ENCOUNTER
1415: Spoke with Jesse Choi. Discussed sentinel lymph node mapping procedure to be done in the   nuclear medicine department at 1300 on Tuesday, July 12- DAY  BEFORE SURGERY. Procedure explained. Instructed Ms. Charles Fair to arrive 45 minutes prior to appointment time, park in [facility], enter [facility]. Ms. Charles Fair verbalized understanding. Ms. Charles Fair shared that she was unaware of imaging procedure- voiced frustration. Encouraged Ms. Martinez to contact Dr. Wale Ferrer staff with questions/concerns regarding scheduling. Jesse Choi verbalized agreement and gratitude for the call.

## 2022-06-28 ENCOUNTER — OFFICE VISIT (OUTPATIENT)
Dept: HEMATOLOGY/ONCOLOGY | Facility: HOSPITAL | Age: 47
End: 2022-06-28
Payer: COMMERCIAL

## 2022-06-28 VITALS
BODY MASS INDEX: 28 KG/M2 | OXYGEN SATURATION: 100 % | WEIGHT: 158.81 LBS | TEMPERATURE: 98 F | HEART RATE: 89 BPM | RESPIRATION RATE: 18 BRPM | SYSTOLIC BLOOD PRESSURE: 131 MMHG | DIASTOLIC BLOOD PRESSURE: 78 MMHG

## 2022-06-28 DIAGNOSIS — C50.811 MALIGNANT NEOPLASM OF OVERLAPPING SITES OF RIGHT FEMALE BREAST, UNSPECIFIED ESTROGEN RECEPTOR STATUS (HCC): Primary | ICD-10-CM

## 2022-06-28 PROCEDURE — 99204 OFFICE O/P NEW MOD 45 MIN: CPT | Performed by: INTERNAL MEDICINE

## 2022-06-28 NOTE — PROGRESS NOTES
Education Record    Learner:  Patient    Disease / Diagnosis:right breast lumpectomy DCIS, .2 mm Inv.  planning braulio mastectomy     Barriers / Limitations:  None   Comments:    Method:  Discussion   Comments:    General Topics:  Plan of care reviewed   Comments:    Outcome:  Shows understanding   Comments:  Healed well from lumpectomy. Planning braulio mastectomy with SLN biopsy poss ALND.

## 2022-07-06 ENCOUNTER — APPOINTMENT (OUTPATIENT)
Dept: HEMATOLOGY/ONCOLOGY | Facility: HOSPITAL | Age: 47
End: 2022-07-06
Payer: COMMERCIAL

## 2022-07-06 ENCOUNTER — NURSE NAVIGATOR ENCOUNTER (OUTPATIENT)
Dept: HEMATOLOGY/ONCOLOGY | Facility: HOSPITAL | Age: 47
End: 2022-07-06

## 2022-07-06 NOTE — PROGRESS NOTES
Pt attended pre-operative mastectomy course in the cancer center. We discussed post operative pain control/constipation risk, incision/drain care, sentinel node procedure and risk for lymphedema. Supplies for home care will be provided by the hospital at the time of discharge. Pt was given a bag with heart shaped pillow, lanyard and safety pins to aid in care at home. Resources provided for ladies' specialty boutiques and post operative resources. Pathology and follow up timelines reviewed. Re-enforced teaching regarding emergency care and when to contact surgeon's office. Pt's were given navigator contact information and encouraged to reach out with any other questions or concerns.

## 2022-07-07 DIAGNOSIS — E10.9 TYPE 1 DIABETES MELLITUS WITHOUT COMPLICATION (HCC): ICD-10-CM

## 2022-07-07 RX ORDER — INSULIN ASPART 100 [IU]/ML
INJECTION, SOLUTION INTRAVENOUS; SUBCUTANEOUS
Qty: 70 ML | Refills: 0 | Status: SHIPPED | OUTPATIENT
Start: 2022-07-07

## 2022-07-07 NOTE — TELEPHONE ENCOUNTER
Last time medication was refilled 4/11/22  Quantity and # of refills 70/0  Last OV 6/20/22  Next OV none scheduled

## 2022-07-10 ENCOUNTER — LAB ENCOUNTER (OUTPATIENT)
Dept: LAB | Facility: HOSPITAL | Age: 47
End: 2022-07-10
Attending: SURGERY
Payer: COMMERCIAL

## 2022-07-10 DIAGNOSIS — Z20.822 ENCOUNTER FOR PREOPERATIVE SCREENING LABORATORY TESTING FOR COVID-19 VIRUS: ICD-10-CM

## 2022-07-10 DIAGNOSIS — Z01.812 ENCOUNTER FOR PREOPERATIVE SCREENING LABORATORY TESTING FOR COVID-19 VIRUS: ICD-10-CM

## 2022-07-10 LAB — SARS-COV-2 RNA RESP QL NAA+PROBE: NOT DETECTED

## 2022-07-12 ENCOUNTER — ANESTHESIA EVENT (OUTPATIENT)
Dept: SURGERY | Facility: HOSPITAL | Age: 47
End: 2022-07-12
Payer: COMMERCIAL

## 2022-07-12 ENCOUNTER — HOSPITAL ENCOUNTER (OUTPATIENT)
Dept: NUCLEAR MEDICINE | Facility: HOSPITAL | Age: 47
Discharge: HOME | End: 2022-07-12
Attending: SURGERY
Payer: COMMERCIAL

## 2022-07-12 DIAGNOSIS — D05.11 DUCTAL CARCINOMA IN SITU (DCIS) OF RIGHT BREAST: ICD-10-CM

## 2022-07-12 PROCEDURE — 78195 LYMPH SYSTEM IMAGING: CPT | Performed by: SURGERY

## 2022-07-12 RX ORDER — LIDOCAINE AND PRILOCAINE 25; 25 MG/G; MG/G
CREAM TOPICAL
Status: DISCONTINUED
Start: 2022-07-12 | End: 2022-07-13

## 2022-07-13 ENCOUNTER — HOSPITAL ENCOUNTER (OUTPATIENT)
Facility: HOSPITAL | Age: 47
Discharge: HOME | End: 2022-07-14
Attending: SURGERY | Admitting: SURGERY
Payer: COMMERCIAL

## 2022-07-13 ENCOUNTER — ANESTHESIA (OUTPATIENT)
Dept: SURGERY | Facility: HOSPITAL | Age: 47
End: 2022-07-13
Payer: COMMERCIAL

## 2022-07-13 DIAGNOSIS — Z01.812 ENCOUNTER FOR PREOPERATIVE SCREENING LABORATORY TESTING FOR COVID-19 VIRUS: Primary | ICD-10-CM

## 2022-07-13 DIAGNOSIS — Z20.822 ENCOUNTER FOR PREOPERATIVE SCREENING LABORATORY TESTING FOR COVID-19 VIRUS: Primary | ICD-10-CM

## 2022-07-13 DIAGNOSIS — D05.11 DUCTAL CARCINOMA IN SITU (DCIS) OF RIGHT BREAST: ICD-10-CM

## 2022-07-13 LAB
B-HCG UR QL: NEGATIVE
CREAT BLD-MCNC: 1.14 MG/DL
GLUCOSE BLD-MCNC: 166 MG/DL (ref 70–99)
GLUCOSE BLD-MCNC: 187 MG/DL (ref 70–99)
GLUCOSE BLD-MCNC: 287 MG/DL (ref 70–99)
GLUCOSE BLD-MCNC: 361 MG/DL (ref 70–99)
GLUCOSE BLD-MCNC: 380 MG/DL (ref 70–99)
GLUCOSE BLD-MCNC: 409 MG/DL (ref 70–99)
GLUCOSE BLD-MCNC: 426 MG/DL (ref 70–99)
GLUCOSE BLD-MCNC: 439 MG/DL (ref 70–99)
GLUCOSE BLD-MCNC: 58 MG/DL (ref 70–99)
GLUCOSE BLD-MCNC: 86 MG/DL (ref 70–99)

## 2022-07-13 PROCEDURE — 07B50ZX EXCISION OF RIGHT AXILLARY LYMPHATIC, OPEN APPROACH, DIAGNOSTIC: ICD-10-PCS | Performed by: SURGERY

## 2022-07-13 PROCEDURE — 0HHV0NZ INSERTION OF TISSUE EXPANDER INTO BILATERAL BREAST, OPEN APPROACH: ICD-10-PCS | Performed by: SURGERY

## 2022-07-13 PROCEDURE — 76942 ECHO GUIDE FOR BIOPSY: CPT | Performed by: ANESTHESIOLOGY

## 2022-07-13 PROCEDURE — 3E0W3KZ INTRODUCTION OF OTHER DIAGNOSTIC SUBSTANCE INTO LYMPHATICS, PERCUTANEOUS APPROACH: ICD-10-PCS | Performed by: SURGERY

## 2022-07-13 PROCEDURE — 0HTV0ZZ RESECTION OF BILATERAL BREAST, OPEN APPROACH: ICD-10-PCS | Performed by: SURGERY

## 2022-07-13 DEVICE — BREAST TISSUE EXPANDER, SUTURE TABS, INTEGRAL INJECTION DOME, 475CC
Type: IMPLANTABLE DEVICE | Site: BREAST | Status: FUNCTIONAL
Brand: MENTOR ARTOURA PLUS, SMOOTH, HIGH PROFILE

## 2022-07-13 DEVICE — MATRIX ALLODERM SELECT: Type: IMPLANTABLE DEVICE | Site: BREAST | Status: FUNCTIONAL

## 2022-07-13 RX ORDER — ACETAMINOPHEN 500 MG
500 TABLET ORAL EVERY 6 HOURS PRN
Status: DISCONTINUED | OUTPATIENT
Start: 2022-07-13 | End: 2022-07-14

## 2022-07-13 RX ORDER — HYDROCODONE BITARTRATE AND ACETAMINOPHEN 5; 325 MG/1; MG/1
1 TABLET ORAL ONCE AS NEEDED
Status: DISCONTINUED | OUTPATIENT
Start: 2022-07-13 | End: 2022-07-13 | Stop reason: HOSPADM

## 2022-07-13 RX ORDER — ACETAMINOPHEN 500 MG
1000 TABLET ORAL EVERY 6 HOURS PRN
Status: ON HOLD | COMMUNITY
End: 2022-07-13

## 2022-07-13 RX ORDER — KETAMINE HYDROCHLORIDE 50 MG/ML
INJECTION, SOLUTION, CONCENTRATE INTRAMUSCULAR; INTRAVENOUS AS NEEDED
Status: DISCONTINUED | OUTPATIENT
Start: 2022-07-13 | End: 2022-07-13 | Stop reason: SURG

## 2022-07-13 RX ORDER — ROSUVASTATIN CALCIUM 20 MG/1
20 TABLET, COATED ORAL NIGHTLY
COMMUNITY

## 2022-07-13 RX ORDER — HYDROCODONE BITARTRATE AND ACETAMINOPHEN 5; 325 MG/1; MG/1
2 TABLET ORAL EVERY 4 HOURS PRN
Status: DISCONTINUED | OUTPATIENT
Start: 2022-07-13 | End: 2022-07-14

## 2022-07-13 RX ORDER — MORPHINE SULFATE 2 MG/ML
1 INJECTION, SOLUTION INTRAMUSCULAR; INTRAVENOUS EVERY 2 HOUR PRN
Status: DISCONTINUED | OUTPATIENT
Start: 2022-07-13 | End: 2022-07-14

## 2022-07-13 RX ORDER — SODIUM CHLORIDE, SODIUM LACTATE, POTASSIUM CHLORIDE, CALCIUM CHLORIDE 600; 310; 30; 20 MG/100ML; MG/100ML; MG/100ML; MG/100ML
INJECTION, SOLUTION INTRAVENOUS CONTINUOUS
Status: DISCONTINUED | OUTPATIENT
Start: 2022-07-13 | End: 2022-07-14

## 2022-07-13 RX ORDER — DEXAMETHASONE SODIUM PHOSPHATE 4 MG/ML
VIAL (ML) INJECTION AS NEEDED
Status: DISCONTINUED | OUTPATIENT
Start: 2022-07-13 | End: 2022-07-13 | Stop reason: SURG

## 2022-07-13 RX ORDER — HYDROMORPHONE HYDROCHLORIDE 1 MG/ML
INJECTION, SOLUTION INTRAMUSCULAR; INTRAVENOUS; SUBCUTANEOUS
Status: COMPLETED
Start: 2022-07-13 | End: 2022-07-13

## 2022-07-13 RX ORDER — ACETAMINOPHEN 500 MG
1000 TABLET ORAL ONCE AS NEEDED
Status: DISCONTINUED | OUTPATIENT
Start: 2022-07-13 | End: 2022-07-13 | Stop reason: HOSPADM

## 2022-07-13 RX ORDER — DOCUSATE SODIUM 100 MG/1
100 CAPSULE, LIQUID FILLED ORAL 2 TIMES DAILY
Qty: 40 CAPSULE | Refills: 0 | Status: SHIPPED | OUTPATIENT
Start: 2022-07-13

## 2022-07-13 RX ORDER — NEOSTIGMINE METHYLSULFATE 1 MG/ML
INJECTION, SOLUTION INTRAVENOUS AS NEEDED
Status: DISCONTINUED | OUTPATIENT
Start: 2022-07-13 | End: 2022-07-13 | Stop reason: SURG

## 2022-07-13 RX ORDER — RAMIPRIL 2.5 MG/1
2.5 CAPSULE ORAL NIGHTLY
COMMUNITY

## 2022-07-13 RX ORDER — ONDANSETRON 2 MG/ML
4 INJECTION INTRAMUSCULAR; INTRAVENOUS EVERY 6 HOURS PRN
Status: DISCONTINUED | OUTPATIENT
Start: 2022-07-13 | End: 2022-07-14

## 2022-07-13 RX ORDER — HYDROMORPHONE HYDROCHLORIDE 1 MG/ML
0.4 INJECTION, SOLUTION INTRAMUSCULAR; INTRAVENOUS; SUBCUTANEOUS EVERY 5 MIN PRN
Status: DISCONTINUED | OUTPATIENT
Start: 2022-07-13 | End: 2022-07-13 | Stop reason: HOSPADM

## 2022-07-13 RX ORDER — RAMIPRIL 2.5 MG/1
2.5 CAPSULE ORAL NIGHTLY
Status: DISCONTINUED | OUTPATIENT
Start: 2022-07-13 | End: 2022-07-14

## 2022-07-13 RX ORDER — HYDROCODONE BITARTRATE AND ACETAMINOPHEN 5; 325 MG/1; MG/1
2 TABLET ORAL ONCE AS NEEDED
Status: DISCONTINUED | OUTPATIENT
Start: 2022-07-13 | End: 2022-07-13 | Stop reason: HOSPADM

## 2022-07-13 RX ORDER — NICOTINE POLACRILEX 4 MG
15 LOZENGE BUCCAL
Status: DISCONTINUED | OUTPATIENT
Start: 2022-07-13 | End: 2022-07-13 | Stop reason: HOSPADM

## 2022-07-13 RX ORDER — PROCHLORPERAZINE EDISYLATE 5 MG/ML
5 INJECTION INTRAMUSCULAR; INTRAVENOUS EVERY 8 HOURS PRN
Status: DISCONTINUED | OUTPATIENT
Start: 2022-07-13 | End: 2022-07-13 | Stop reason: HOSPADM

## 2022-07-13 RX ORDER — ONDANSETRON 2 MG/ML
4 INJECTION INTRAMUSCULAR; INTRAVENOUS EVERY 6 HOURS PRN
Status: DISCONTINUED | OUTPATIENT
Start: 2022-07-13 | End: 2022-07-13 | Stop reason: HOSPADM

## 2022-07-13 RX ORDER — METOCLOPRAMIDE HYDROCHLORIDE 5 MG/ML
10 INJECTION INTRAMUSCULAR; INTRAVENOUS EVERY 6 HOURS PRN
Status: DISCONTINUED | OUTPATIENT
Start: 2022-07-13 | End: 2022-07-14

## 2022-07-13 RX ORDER — ENOXAPARIN SODIUM 100 MG/ML
40 INJECTION SUBCUTANEOUS DAILY
Status: DISCONTINUED | OUTPATIENT
Start: 2022-07-14 | End: 2022-07-14

## 2022-07-13 RX ORDER — PHENYLEPHRINE HCL 10 MG/ML
VIAL (ML) INJECTION AS NEEDED
Status: DISCONTINUED | OUTPATIENT
Start: 2022-07-13 | End: 2022-07-13 | Stop reason: SURG

## 2022-07-13 RX ORDER — DOCUSATE SODIUM 100 MG/1
100 CAPSULE, LIQUID FILLED ORAL 2 TIMES DAILY
Status: DISCONTINUED | OUTPATIENT
Start: 2022-07-14 | End: 2022-07-14

## 2022-07-13 RX ORDER — SODIUM CHLORIDE, SODIUM LACTATE, POTASSIUM CHLORIDE, CALCIUM CHLORIDE 600; 310; 30; 20 MG/100ML; MG/100ML; MG/100ML; MG/100ML
INJECTION, SOLUTION INTRAVENOUS CONTINUOUS
Status: DISCONTINUED | OUTPATIENT
Start: 2022-07-13 | End: 2022-07-13 | Stop reason: HOSPADM

## 2022-07-13 RX ORDER — MORPHINE SULFATE 2 MG/ML
2 INJECTION, SOLUTION INTRAMUSCULAR; INTRAVENOUS EVERY 2 HOUR PRN
Status: DISCONTINUED | OUTPATIENT
Start: 2022-07-13 | End: 2022-07-14

## 2022-07-13 RX ORDER — BUPIVACAINE HYDROCHLORIDE 2.5 MG/ML
INJECTION, SOLUTION EPIDURAL; INFILTRATION; INTRACAUDAL AS NEEDED
Status: DISCONTINUED | OUTPATIENT
Start: 2022-07-13 | End: 2022-07-13 | Stop reason: SURG

## 2022-07-13 RX ORDER — ROCURONIUM BROMIDE 10 MG/ML
INJECTION, SOLUTION INTRAVENOUS AS NEEDED
Status: DISCONTINUED | OUTPATIENT
Start: 2022-07-13 | End: 2022-07-13 | Stop reason: SURG

## 2022-07-13 RX ORDER — PROCHLORPERAZINE EDISYLATE 5 MG/ML
INJECTION INTRAMUSCULAR; INTRAVENOUS
Status: COMPLETED
Start: 2022-07-13 | End: 2022-07-13

## 2022-07-13 RX ORDER — METOCLOPRAMIDE 10 MG/1
10 TABLET ORAL EVERY 6 HOURS PRN
Status: DISCONTINUED | OUTPATIENT
Start: 2022-07-13 | End: 2022-07-14

## 2022-07-13 RX ORDER — EPHEDRINE SULFATE 50 MG/ML
INJECTION INTRAVENOUS AS NEEDED
Status: DISCONTINUED | OUTPATIENT
Start: 2022-07-13 | End: 2022-07-13 | Stop reason: SURG

## 2022-07-13 RX ORDER — ACETAMINOPHEN 500 MG
1000 TABLET ORAL ONCE
Status: DISCONTINUED | OUTPATIENT
Start: 2022-07-13 | End: 2022-07-13 | Stop reason: HOSPADM

## 2022-07-13 RX ORDER — DIPHENHYDRAMINE HYDROCHLORIDE 50 MG/ML
12.5 INJECTION INTRAMUSCULAR; INTRAVENOUS EVERY 4 HOURS PRN
Status: DISCONTINUED | OUTPATIENT
Start: 2022-07-13 | End: 2022-07-14

## 2022-07-13 RX ORDER — CEFAZOLIN SODIUM/WATER 2 G/20 ML
2 SYRINGE (ML) INTRAVENOUS ONCE
Status: COMPLETED | OUTPATIENT
Start: 2022-07-13 | End: 2022-07-13

## 2022-07-13 RX ORDER — NICOTINE POLACRILEX 4 MG
30 LOZENGE BUCCAL
Status: DISCONTINUED | OUTPATIENT
Start: 2022-07-13 | End: 2022-07-13 | Stop reason: HOSPADM

## 2022-07-13 RX ORDER — HYDROMORPHONE HYDROCHLORIDE 1 MG/ML
0.2 INJECTION, SOLUTION INTRAMUSCULAR; INTRAVENOUS; SUBCUTANEOUS EVERY 5 MIN PRN
Status: DISCONTINUED | OUTPATIENT
Start: 2022-07-13 | End: 2022-07-13 | Stop reason: HOSPADM

## 2022-07-13 RX ORDER — ONDANSETRON 4 MG/1
4 TABLET, FILM COATED ORAL EVERY 8 HOURS PRN
Qty: 30 TABLET | Refills: 0 | Status: SHIPPED | OUTPATIENT
Start: 2022-07-13

## 2022-07-13 RX ORDER — DEXTROSE MONOHYDRATE 25 G/50ML
50 INJECTION, SOLUTION INTRAVENOUS
Status: DISCONTINUED | OUTPATIENT
Start: 2022-07-13 | End: 2022-07-13 | Stop reason: HOSPADM

## 2022-07-13 RX ORDER — HYDROMORPHONE HYDROCHLORIDE 1 MG/ML
0.6 INJECTION, SOLUTION INTRAMUSCULAR; INTRAVENOUS; SUBCUTANEOUS EVERY 5 MIN PRN
Status: DISCONTINUED | OUTPATIENT
Start: 2022-07-13 | End: 2022-07-13 | Stop reason: HOSPADM

## 2022-07-13 RX ORDER — CEFAZOLIN SODIUM/WATER 2 G/20 ML
2 SYRINGE (ML) INTRAVENOUS EVERY 8 HOURS
Status: DISCONTINUED | OUTPATIENT
Start: 2022-07-13 | End: 2022-07-14

## 2022-07-13 RX ORDER — INSULIN ASPART 100 [IU]/ML
INJECTION, SOLUTION INTRAVENOUS; SUBCUTANEOUS
Status: COMPLETED
Start: 2022-07-13 | End: 2022-07-13

## 2022-07-13 RX ORDER — ROSUVASTATIN CALCIUM 20 MG/1
20 TABLET, COATED ORAL NIGHTLY
Status: DISCONTINUED | OUTPATIENT
Start: 2022-07-13 | End: 2022-07-14

## 2022-07-13 RX ORDER — SODIUM CHLORIDE 9 MG/ML
INJECTION INTRAVENOUS AS NEEDED
Status: DISCONTINUED | OUTPATIENT
Start: 2022-07-13 | End: 2022-07-13

## 2022-07-13 RX ORDER — HYDROCODONE BITARTRATE AND ACETAMINOPHEN 5; 325 MG/1; MG/1
1 TABLET ORAL EVERY 4 HOURS PRN
Status: DISCONTINUED | OUTPATIENT
Start: 2022-07-13 | End: 2022-07-14

## 2022-07-13 RX ORDER — INSULIN ASPART 100 [IU]/ML
INJECTION, SOLUTION INTRAVENOUS; SUBCUTANEOUS ONCE
Status: COMPLETED | OUTPATIENT
Start: 2022-07-13 | End: 2022-07-13

## 2022-07-13 RX ORDER — DEXTROSE MONOHYDRATE 25 G/50ML
25 INJECTION, SOLUTION INTRAVENOUS AS NEEDED
Status: COMPLETED | OUTPATIENT
Start: 2022-07-13 | End: 2022-07-13

## 2022-07-13 RX ORDER — NALOXONE HYDROCHLORIDE 0.4 MG/ML
80 INJECTION, SOLUTION INTRAMUSCULAR; INTRAVENOUS; SUBCUTANEOUS AS NEEDED
Status: DISCONTINUED | OUTPATIENT
Start: 2022-07-13 | End: 2022-07-13 | Stop reason: HOSPADM

## 2022-07-13 RX ORDER — ACETAMINOPHEN 500 MG
1000 TABLET ORAL EVERY 6 HOURS PRN
Status: DISCONTINUED | OUTPATIENT
Start: 2022-07-13 | End: 2022-07-14

## 2022-07-13 RX ORDER — DEXAMETHASONE SODIUM PHOSPHATE 10 MG/ML
INJECTION, SOLUTION INTRAMUSCULAR; INTRAVENOUS AS NEEDED
Status: DISCONTINUED | OUTPATIENT
Start: 2022-07-13 | End: 2022-07-13 | Stop reason: SURG

## 2022-07-13 RX ORDER — MORPHINE SULFATE 2 MG/ML
0.5 INJECTION, SOLUTION INTRAMUSCULAR; INTRAVENOUS EVERY 2 HOUR PRN
Status: DISCONTINUED | OUTPATIENT
Start: 2022-07-13 | End: 2022-07-14

## 2022-07-13 RX ORDER — CEPHALEXIN 500 MG/1
500 CAPSULE ORAL 4 TIMES DAILY
Qty: 40 CAPSULE | Refills: 2 | Status: SHIPPED | OUTPATIENT
Start: 2022-07-13

## 2022-07-13 RX ORDER — SCOLOPAMINE TRANSDERMAL SYSTEM 1 MG/1
1 PATCH, EXTENDED RELEASE TRANSDERMAL ONCE
Status: DISCONTINUED | OUTPATIENT
Start: 2022-07-13 | End: 2022-07-13 | Stop reason: HOSPADM

## 2022-07-13 RX ORDER — DIPHENHYDRAMINE HCL 25 MG
25 CAPSULE ORAL EVERY 4 HOURS PRN
Status: DISCONTINUED | OUTPATIENT
Start: 2022-07-13 | End: 2022-07-14

## 2022-07-13 RX ORDER — LIDOCAINE HYDROCHLORIDE 10 MG/ML
INJECTION, SOLUTION EPIDURAL; INFILTRATION; INTRACAUDAL; PERINEURAL AS NEEDED
Status: DISCONTINUED | OUTPATIENT
Start: 2022-07-13 | End: 2022-07-13 | Stop reason: SURG

## 2022-07-13 RX ORDER — ONDANSETRON 2 MG/ML
INJECTION INTRAMUSCULAR; INTRAVENOUS AS NEEDED
Status: DISCONTINUED | OUTPATIENT
Start: 2022-07-13 | End: 2022-07-13 | Stop reason: SURG

## 2022-07-13 RX ORDER — GLYCOPYRROLATE 0.2 MG/ML
INJECTION, SOLUTION INTRAMUSCULAR; INTRAVENOUS AS NEEDED
Status: DISCONTINUED | OUTPATIENT
Start: 2022-07-13 | End: 2022-07-13 | Stop reason: SURG

## 2022-07-13 RX ORDER — ONDANSETRON 4 MG/1
4 TABLET, ORALLY DISINTEGRATING ORAL EVERY 6 HOURS PRN
Status: DISCONTINUED | OUTPATIENT
Start: 2022-07-13 | End: 2022-07-14

## 2022-07-13 RX ORDER — HYDROCODONE BITARTRATE AND ACETAMINOPHEN 5; 325 MG/1; MG/1
1-2 TABLET ORAL EVERY 4 HOURS PRN
Qty: 40 TABLET | Refills: 0 | Status: SHIPPED | OUTPATIENT
Start: 2022-07-13

## 2022-07-13 RX ADMIN — SODIUM CHLORIDE, SODIUM LACTATE, POTASSIUM CHLORIDE, CALCIUM CHLORIDE: 600; 310; 30; 20 INJECTION, SOLUTION INTRAVENOUS at 10:59:00

## 2022-07-13 RX ADMIN — EPHEDRINE SULFATE 5 MG: 50 INJECTION INTRAVENOUS at 09:12:00

## 2022-07-13 RX ADMIN — BUPIVACAINE HYDROCHLORIDE 60 ML: 2.5 INJECTION, SOLUTION EPIDURAL; INFILTRATION; INTRACAUDAL at 07:59:00

## 2022-07-13 RX ADMIN — CEFAZOLIN SODIUM/WATER 2 G: 2 G/20 ML SYRINGE (ML) INTRAVENOUS at 07:45:00

## 2022-07-13 RX ADMIN — SODIUM CHLORIDE, SODIUM LACTATE, POTASSIUM CHLORIDE, CALCIUM CHLORIDE: 600; 310; 30; 20 INJECTION, SOLUTION INTRAVENOUS at 07:41:00

## 2022-07-13 RX ADMIN — PHENYLEPHRINE HCL 100 MCG: 10 MG/ML VIAL (ML) INJECTION at 09:48:00

## 2022-07-13 RX ADMIN — DEXAMETHASONE SODIUM PHOSPHATE 4 MG: 10 INJECTION, SOLUTION INTRAMUSCULAR; INTRAVENOUS at 07:59:00

## 2022-07-13 RX ADMIN — ROCURONIUM BROMIDE 30 MG: 10 INJECTION, SOLUTION INTRAVENOUS at 09:20:00

## 2022-07-13 RX ADMIN — ONDANSETRON 4 MG: 2 INJECTION INTRAMUSCULAR; INTRAVENOUS at 10:43:00

## 2022-07-13 RX ADMIN — NEOSTIGMINE METHYLSULFATE 3 MG: 1 INJECTION, SOLUTION INTRAVENOUS at 10:43:00

## 2022-07-13 RX ADMIN — LIDOCAINE HYDROCHLORIDE 50 MG: 10 INJECTION, SOLUTION EPIDURAL; INFILTRATION; INTRACAUDAL; PERINEURAL at 07:45:00

## 2022-07-13 RX ADMIN — ROCURONIUM BROMIDE 50 MG: 10 INJECTION, SOLUTION INTRAVENOUS at 07:45:00

## 2022-07-13 RX ADMIN — DEXAMETHASONE SODIUM PHOSPHATE 4 MG: 4 MG/ML VIAL (ML) INJECTION at 08:05:00

## 2022-07-13 RX ADMIN — PHENYLEPHRINE HCL 50 MCG: 10 MG/ML VIAL (ML) INJECTION at 09:31:00

## 2022-07-13 RX ADMIN — KETAMINE HYDROCHLORIDE 25 MG: 50 INJECTION, SOLUTION, CONCENTRATE INTRAMUSCULAR; INTRAVENOUS at 08:12:00

## 2022-07-13 RX ADMIN — GLYCOPYRROLATE 0.4 MG: 0.2 INJECTION, SOLUTION INTRAMUSCULAR; INTRAVENOUS at 10:43:00

## 2022-07-13 RX ADMIN — PHENYLEPHRINE HCL 50 MCG: 10 MG/ML VIAL (ML) INJECTION at 09:36:00

## 2022-07-13 NOTE — ANESTHESIA POSTPROCEDURE EVALUATION
[address] Patient Status:  Outpatient in a Bed   Age/Gender 40year old female MRN [id number]   Location [address] [facility] Attending Ghulam Lemon MD   Hosp Day # 0 PCP Grisel Bearden MD       Anesthesia Post-op Note    Bilateral breast nipple versus skin sparing mastectomies, right lymphoscintigraphy, right sentinel lymph node biopsy ([address]) Immediate breast reconstruction with placement of bilateral breast tissue expanders, acellular dermal matrix (AILYN)    Procedure Summary     Date: 07/13/22 Room / Location: [facility] MAIN OR 15 / [facility] MAIN OR    Anesthesia Start: 0740 Anesthesia Stop: 1059    Procedures:       Bilateral breast nipple versus skin sparing mastectomies, right lymphoscintigraphy, right sentinel lymph node biopsy (GRESIK) Immediate breast reconstruction with placement of bilateral breast tissue expanders, acellular dermal matrix (AILYN) (Bilateral Breast)      Immediate breast reconstruction with placement of bilateral breast tissue expanders, acellular dermal matrix (AILYN), Bilateral, General (Bilateral Breast) Diagnosis:       Ductal carcinoma in situ (DCIS) of right breast      (Ductal carcinoma in situ (DCIS) of right breast [D05.11])    Surgeons: Ghulam Lemon MD; Aminata Mayberry MD Anesthesiologist: Nenita Ramirez MD    Anesthesia Type: general ASA Status: 3          Anesthesia Type: general    Vitals Value Taken Time   /53 07/13/22 1100   Temp 98.5 07/13/22 1100   Pulse 102 07/13/22 1100   Resp 20 07/13/22 1100   SpO2 97 07/13/22 1100       Patient Location: PACU    Anesthesia Type: general    Airway Patency: patent    Postop Pain Control: adequate    Mental Status: mildly sedated but able to meaningfully participate in the post-anesthesia evaluation    Nausea/Vomiting: none    Cardiopulmonary/Hydration status: stable euvolemic    Complications: no apparent anesthesia related complications    Postop vital signs: stable    Comments: signout given to RN Hossein Hassan    Will manage glucose    Dental Exam: Unchanged from Preop    Patient to be discharged from PACU when criteria met.

## 2022-07-13 NOTE — PROGRESS NOTES
Plan for bilateral skin-sparing mastectomy by Dr. Iqra Cedeño and immediate reconstruction with tissue expander and acellular dermal matrix reviewed with patient. The risks of surgery including but not limited to bleeding, infection, scarring, delayed wound healing, seroma, asymmetry, implant infection/extrusion requiring removal, expander deflation, injury to adjacent structures, capsular contracture, ALCL, and need for further surgery were reviewed. We reviewed the elevated risk of wound healing difficulties and reconstructive loss given the patient's poorly controlled diabetes. The expected post-operative course was discussed. Questions were answered to the patient's satisfaction. No guarantees as to outcome were offered. The patient expresses understanding and wishes to proceed.

## 2022-07-13 NOTE — OPERATIVE REPORT
OPERATIVE REPORT    PREOPERATIVE DIAGNOSIS: Right breast ductal carcinoma in situ with acquired absence of bilateral breasts. POSTOPERATIVE DIAGNOSIS: Right breast ductal carcinoma in situ with acquired absence of bilateral breasts. PROCEDURE PERFORMED: Bilateral immediate breast reconstruction with tissue expander and acellular dermal matrix. ASSISTANT: DEMETRIS Hines  ANESTHESIA: General with endotracheal intubation. ESTIMATED BLOOD LOSS: 10ml  DRAINS: Mariangel Craft x 4  SPECIMENS: None  COMPLICATIONS: None. FINDINGS:   Bilateral breasts reconstructed with [place] Artoura Plus, smooth tissue expander, reference number [id number], on the right serial number [id number], on the left serial number [id number]. The tissue expanders were placed in the partial submuscular position with inferolateral Alloderm sling for soft tissue support. Tissue expanders were filled intraoperatively with 250cc of saline. INDICATIONS: The patient is a 40year old   female with right breast ductal carcinoma in situ breast cancer. The patient was evaluated by Dr. Luigi Richards and elected to undergo bilateral skin-sparing mastectomies. She was referred preoperatively to discuss reconstructive options and opted for immediate reconstruction with tissue expanders and acellular dermal matrix. PROCEDURE: Informed consent was obtained from the patient. The risks, benefits, and alternatives were reviewed with the patient preoperatively. She expressed understanding and wished to proceed. The patient was marked in the preoperative holding area in the upright position. The midline, inframammary fold, lateral fold of the breasts were marked. Periareolar incisions were marked in conjunction with Dr. Luigi Richards. The patient was then taken to the operating room by Dr. Ashley Burton team where she underwent bilateral skin-sparing mastectomies.   Once the mastectomies were completed, I entered the room and the plastic surgery portion of the procedure began.    The mastectomy skin flaps were inspected and appeared to be of suitable thickness of viability to facilitate immediate reconstruction. The right breast was reconstructed first. The pocket was irrigated with Betadine and then with warm saline irrigation until all particulate fat was evacuated. Hemostasis was then secured with electrocautery. A subpectoral pocket was then created with electrocautery. Larger perforators were individually clipped and divided. The inferior origin of the pectoralis muscles were released in the midline. Once an adequate subpectoral pocket had been created, a sheet of large acellular dermal matrix was brought onto the field and soaked in saline. The acellular dermal matrix was then brought to the breast pocket utilizing a no touch technique. The acellular dermal matrix was secured to the inframammary fold and laterally to serratus fascia with 2-0 PDS suture. The pocket was then irrigated with antibiotic irrigation. Gloves were changed. The right-sided tissue expander was then brought onto the field and immediately bathed in antibiotic irrigation. The expander was checked for integrity and noted to be intact. The expander was accessed and all air was evacuated. The expander was then placed in the subpectoral pocket, taking care to maintain its proper orientation. The suture tabs were then secured to the chest wall with interrupted 2-0 Vicryl suture. The superior edge of the acellular dermal matrix was then secured to the inferior aspect of the pectoralis muscle with a running 2-0 PDS suture. The pocket was then irrigated with antibiotic irrigation and hemostasis noted to be adequate. The mastectomy margins were inspected and appeared well-perfused. Two 15-[place] Jose drains were exited through a lateral stab incision and sutured to the skin with 3-0 nylon suture. The expander was then accessed with a butterfly needle and filled with 250mL of saline.  This permitted tension-free closure of the skin edges. The skin was then closed with interrupted 3-0 Vicryl deep dermal suture and running 4-0 Monocryl subcuticular suture. Attention was then turned to the left breast. he pocket was irrigated with Betadine and then with warm saline irrigation until all particulate fat was evacuated. Hemostasis was then secured with electrocautery. A subpectoral pocket was then created with electrocautery. Larger perforators were individually clipped and divided. The inferior origin of the pectoralis muscles were released in the midline. Once an adequate subpectoral pocket had been created, a sheet of large acellular dermal matrix was brought onto the field and soaked in saline. The acellular dermal matrix was then brought to the breast pocket utilizing a no touch technique. The acellular dermal matrix was secured to the inframammary fold and laterally to serratus fascia with 2-0 PDS suture. The pocket was then irrigated with antibiotic irrigation. Gloves were changed. The left-sided tissue expander was then brought onto the field and immediately bathed in antibiotic irrigation. The expander was checked for integrity and noted to be intact. The expander was accessed and all air was evacuated. The expander was then placed in the subpectoral pocket, taking care to maintain its proper orientation. The medial and superior suture tabs were then secured to the chest wall with interrupted 2-0 Vicryl suture. The superior edge of the acellular dermal matrix was then secured to the inferior aspect of the pectoralis muscle with a running 2-0 PDS suture. The pocket was then irrigated with antibiotic irrigation and hemostasis noted to be adequate. The mastectomy margins were inspected and appeared well-perfused. Two 15-[place] Jose drain was exited through a lateral stab incision and sutured to the skin with 3-0 nylon suture. The expander was then accessed with a butterfly needle and filled with 250mL of saline.  This permitted tension-free closure of the skin edges. The skin was then closed with interrupted 3-0 Vicryl deep dermal suture and running 4-0 Monocryl subcuticular suture. The drain sites were dressed with BioPatch and Tegaderm. The incisions dressed with Exofin and Steri-Strips. Fluff gauze and a surgical bra were applied. The patient was awakened, extubated, and taken to the recovery area in stable condition. There were no operative complications. All needle, sponge, and instrument counts were correct at the end of the procedure.

## 2022-07-13 NOTE — BRIEF OP NOTE
Pre-Operative Diagnosis: Ductal carcinoma in situ (DCIS) of right breast [D05.11]     Post-Operative Diagnosis: Ductal carcinoma in situ (DCIS) of right breast [D05.11]      Procedure Performed:   Bilateral breast nipple versus skin sparing mastectomies, right lymphoscintigraphy, right sentinel lymph node biopsy (GRESIK)     Surgeon(s) and Role:  Panel 1:     Anthony Mejia MD - Primary    Assistant(s):  Surgical Assistant.: Rashmi Tyson CSA     Surgical Findings: R SLN x1 negative on frozen section     Specimen: R SLN x1, Bilateral breasts     Estimated Blood Loss: 50cc    Kyler Fajardo MD  7/13/2022  9:13 AM

## 2022-07-13 NOTE — ANESTHESIA PROCEDURE NOTES
Regional Block  Performed by: Lucas Dominguez MD  Authorized by: Lucas Dominguez MD       General Information and Staff    Start Time:  7/13/2022 7:50 AM  End Time:  7/13/2022 7:59 AM  Anesthesiologist:  Lucas Dominguez MD  Performed by: Anesthesiologist  Patient Location:  OR    Block Placement: Post Induction  Site Identification: real time ultrasound guided and image stored and retrievable    Block site/laterality marked before start: site marked  Reason for Block: at surgeon's request and post-op pain management    Preanesthetic Checklist: 2 patient identifers, IV checked, risks and benefits discussed, monitors and equipment checked, pre-op evaluation, timeout performed, anesthesia consent, sterile technique used, no prohibitive neurological deficits and no local skin infection at insertion site      Procedure Details    Patient Position:  Supine  Prep: ChloraPrep    Monitoring:  Cardiac monitor, continuous pulse ox and blood pressure cuff  Block Type:  PEC1 and PEC2  Laterality:  Bilateral  Injection Technique:  Single-shot    Needle    Needle Type:  Short-bevel and echogenic  Needle Gauge:  21 G  Needle Length:  110 mm  Needle Localization:  Ultrasound guidance  Reason for Ultrasound Use: appropriate spread of the medication was noted in real time and no ultrasound evidence of intravascular and/or intraneural injection            Assessment    Injection Assessment:  Good spread noted, negative resistance, negative aspiration for heme, incremental injection and low pressure  Heart Rate Change: No    - Patient tolerated block procedure well without evidence of immediate block related complications.      Medications      Additional Comments    Medication:  Bupivacaine 0.25% 30mL + 2mg PF decadrone bilaterally, 20mL in Pec II, 10mL in pec I

## 2022-07-13 NOTE — ANESTHESIA PROCEDURE NOTES
Airway  Date/Time: 7/13/2022 7:48 AM  Urgency: elective    Airway not difficult    General Information and Staff    Patient location during procedure: OR  Anesthesiologist: Guille Guadalupe MD  Performed: anesthesiologist     Indications and Patient Condition  Indications for airway management: anesthesia  Spontaneous Ventilation: absent  Sedation level: deep  Preoxygenated: yes  Patient position: sniffing  Mask difficulty assessment: 1 - vent by mask    Final Airway Details  Final airway type: endotracheal airway      Successful airway: ETT  Cuffed: yes   Successful intubation technique: direct laryngoscopy  Endotracheal tube insertion site: oral  Blade: Rosario  ETT size (mm): 7.0    Cormack-Lehane Classification: grade I - full view of glottis  Placement verified by: chest auscultation and capnometry   Measured from: lips  ETT to lips (cm): 21  Number of attempts at approach: 1  Number of other approaches attempted: 0    Additional Comments  Smooth induction. Eyes taped after induction, prior to intubation. Uncomplicated, successful intubation. Dentition same as previous, atraumatic.

## 2022-07-13 NOTE — PLAN OF CARE
A&Ox4. VSS. RA. . Denies chest pain and SOB. GI: Abdomen soft, nondistended. Denies nausea. : DTV at thsi time. Pain controlled with PRN pain medications. Up with standby assist.  Drains: 4 ZACHARY drains--2 to each breast. Serosanguinous output in each bulb. Incisions: Bilateral breast incisions--no drainage. Kerlix fluffs and surgical bra covering. Diet: General diet--tolerating. IVF running per order. IV abx scheduled. All appropriate safety measures in place. All questions and concerns addressed. Will continue to monitor.      Problem: Patient/Family Goals  Goal: Patient/Family Long Term Goal  Description: Patient's Long Term Goal: Discharge    Interventions:  -Tolerate pain  -Tolerate diet  -ZACHARY drain management  - See additional Care Plan goals for specific interventions  Outcome: Progressing  Goal: Patient/Family Short Term Goal  Description: Patient's Short Term Goal: Comfort    Interventions:   -PRN pain medication  - See additional Care Plan goals for specific interventions  Outcome: Progressing     Problem: PAIN - ADULT  Goal: Verbalizes/displays adequate comfort level or patient's stated pain goal  Description: INTERVENTIONS:  - Encourage pt to monitor pain and request assistance  - Assess pain using appropriate pain scale  - Administer analgesics based on type and severity of pain and evaluate response  - Implement non-pharmacological measures as appropriate and evaluate response  - Consider cultural and social influences on pain and pain management  - Manage/alleviate anxiety  - Utilize distraction and/or relaxation techniques  - Monitor for opioid side effects  - Notify MD/LIP if interventions unsuccessful or patient reports new pain  - Anticipate increased pain with activity and pre-medicate as appropriate  Outcome: Progressing     Problem: RISK FOR INFECTION - ADULT  Goal: Absence of fever/infection during anticipated neutropenic period  Description: INTERVENTIONS  - Monitor WBC  - Administer growth factors as ordered  - Implement neutropenic guidelines  Outcome: Progressing     Problem: SAFETY ADULT - FALL  Goal: Free from fall injury  Description: INTERVENTIONS:  - Assess pt frequently for physical needs  - Identify cognitive and physical deficits and behaviors that affect risk of falls.   - [place] fall precautions as indicated by assessment.  - Educate pt/family on patient safety including physical limitations  - Instruct pt to call for assistance with activity based on assessment  - Modify environment to reduce risk of injury  - Provide assistive devices as appropriate  - Consider OT/PT consult to assist with strengthening/mobility  - Encourage toileting schedule  Outcome: Progressing     Problem: DISCHARGE PLANNING  Goal: Discharge to home or other facility with appropriate resources  Description: INTERVENTIONS:  - Identify barriers to discharge w/pt and caregiver  - Include patient/family/discharge partner in discharge planning  - Arrange for needed discharge resources and transportation as appropriate  - Identify discharge learning needs (meds, wound care, etc)  - Arrange for interpreters to assist at discharge as needed  - Consider post-discharge preferences of patient/family/discharge partner  - Complete POLST form as appropriate  - Assess patient's ability to be responsible for managing their own health  - Refer to Case Management Department for coordinating discharge planning if the patient needs post-hospital services based on physician/LIP order or complex needs related to functional status, cognitive ability or social support system  Outcome: Progressing

## 2022-07-13 NOTE — BRIEF OP NOTE
For Patient Education Materials:  z.[facility]/sharla       [facility] Pediatric Rheumatology    For Help:  The Pediatric Call Center at [phone] can help with scheduling of routine follow up visits.  Bernarda Way and Angela Mendez are the Nurse Coordinators for the Division of Pediatric Rheumatology and can be reached by phone at [phone] or through [employer] [email or web address]). They can help with questions about your child s rheumatic condition, medications, and test results.  For emergencies after hours or on the weekends, please call the page  at [phone] and ask to speak to the physician on-call for Pediatric Rheumatology. Please do not use [employer] for urgent requests.  Main  Services:  [phone]  o Hmong/Finnish/[place]rachito: [phone]  o Finnish: [phone]  o Telugu: [phone]    Internal Referrals: If we refer your child to another physician/team within [facility], you should receive a call to set this up. If you do not hear anything within a week, please call the Call Center at [phone].    External Referrals: If we refer your child to a physician/team outside of [facility], our team will send the referral order and relevant records to them. We ask that you call the place where your child is being referred to ensure they received the needed information and notify our team coordinators if not.    Imaging: If your child needs an imaging study that is not being performed the day of your clinic appointment, please call to set this up. For xrays, ultrasounds, and echocardiogram call [phone]. For CT or MRI call [phone].     MyChart: We encourage you to sign up for [employer] at [email or web address]. For assistance or questions, call [phone]. If your child is 12 years or older, a consent for proxy/parent access needs to be signed so please discuss this with your physician at the next visit.        Pre-Operative Diagnosis: Ductal carcinoma in situ (DCIS) of right breast [D05.11]     Post-Operative Diagnosis: Ductal carcinoma in situ (DCIS) of right breast [D05.11]      Procedure Performed:   Bilateral breast nipple versus skin sparing mastectomies, right lymphoscintigraphy, right sentinel lymph node biopsy (GRESIK) Immediate breast reconstruction with placement of bilateral breast tissue expanders, acellular dermal matrix (AILYN)     Sub pec, alloderm ADM, intra-op SALINE fill    Surgeon(s) and Role:  Panel 1:     Renee Aceves MD - Primary  Panel 2:     * Isha Garcia MD - Primary    Assistant(s):  Surgical Assistant.: Renate Robledo CSA  PA: Roger Dickey     Surgical Findings: as dictated     Specimen: per Dr. Chula Swenson     Estimated Blood Loss: Blood Output: 60 mL (7/13/2022 10:43 AM)      Dejuan Downey PA-C  7/13/2022  10:52 AM

## 2022-07-14 VITALS
WEIGHT: 162 LBS | HEIGHT: 63 IN | DIASTOLIC BLOOD PRESSURE: 58 MMHG | SYSTOLIC BLOOD PRESSURE: 119 MMHG | TEMPERATURE: 98 F | BODY MASS INDEX: 28.7 KG/M2 | OXYGEN SATURATION: 94 % | RESPIRATION RATE: 16 BRPM | HEART RATE: 87 BPM

## 2022-07-14 LAB
GLUCOSE BLD-MCNC: 199 MG/DL (ref 70–99)
GLUCOSE BLD-MCNC: 356 MG/DL (ref 70–99)

## 2022-07-14 PROCEDURE — 99212 OFFICE O/P EST SF 10 MIN: CPT | Performed by: INTERNAL MEDICINE

## 2022-07-14 NOTE — PLAN OF CARE
A&O x4. Pt reporting moderate pain to breasts, pain meds given per mar. incisions with steri strips c/d/i, kerlix fluffs and surgical bra in place all c/d/i. ZACHARY drains x4 all milked and putting out a serosanguinous drainage. Tolerating diet. Voiding. Poc discussed, pt verbalized understanding, pt resting in bed call light within reach.      Problem: Patient/Family Goals  Goal: Patient/Family Long Term Goal  Description: Patient's Long Term Goal: Discharge    Interventions:  -Tolerate pain  -Tolerate diet  -ZACHARY drain management  - See additional Care Plan goals for specific interventions  Outcome: Progressing  Goal: Patient/Family Short Term Goal  Description: Patient's Short Term Goal: Comfort    Interventions:   -PRN pain medication  - See additional Care Plan goals for specific interventions  Outcome: Progressing     Problem: PAIN - ADULT  Goal: Verbalizes/displays adequate comfort level or patient's stated pain goal  Description: INTERVENTIONS:  - Encourage pt to monitor pain and request assistance  - Assess pain using appropriate pain scale  - Administer analgesics based on type and severity of pain and evaluate response  - Implement non-pharmacological measures as appropriate and evaluate response  - Consider cultural and social influences on pain and pain management  - Manage/alleviate anxiety  - Utilize distraction and/or relaxation techniques  - Monitor for opioid side effects  - Notify MD/LIP if interventions unsuccessful or patient reports new pain  - Anticipate increased pain with activity and pre-medicate as appropriate  Outcome: Progressing     Problem: RISK FOR INFECTION - ADULT  Goal: Absence of fever/infection during anticipated neutropenic period  Description: INTERVENTIONS  - Monitor WBC  - Administer growth factors as ordered  - Implement neutropenic guidelines  Outcome: Progressing     Problem: SAFETY ADULT - FALL  Goal: Free from fall injury  Description: INTERVENTIONS:  - Assess pt frequently for physical needs  - Identify cognitive and physical deficits and behaviors that affect risk of falls.   - [place] fall precautions as indicated by assessment.  - Educate pt/family on patient safety including physical limitations  - Instruct pt to call for assistance with activity based on assessment  - Modify environment to reduce risk of injury  - Provide assistive devices as appropriate  - Consider OT/PT consult to assist with strengthening/mobility  - Encourage toileting schedule  Outcome: Progressing     Problem: DISCHARGE PLANNING  Goal: Discharge to home or other facility with appropriate resources  Description: INTERVENTIONS:  - Identify barriers to discharge w/pt and caregiver  - Include patient/family/discharge partner in discharge planning  - Arrange for needed discharge resources and transportation as appropriate  - Identify discharge learning needs (meds, wound care, etc)  - Arrange for interpreters to assist at discharge as needed  - Consider post-discharge preferences of patient/family/discharge partner  - Complete POLST form as appropriate  - Assess patient's ability to be responsible for managing their own health  - Refer to Case Management Department for coordinating discharge planning if the patient needs post-hospital services based on physician/LIP order or complex needs related to functional status, cognitive ability or social support system  Outcome: Progressing

## 2022-07-14 NOTE — PROGRESS NOTES
NURSING DISCHARGE NOTE    Discharged Home via Wheelchair. Accompanied by Support staff  Belongings Taken by patient/family. Vss. Pt a&ox4. pt on ra with 02 sats wnl. Lungs cta. Pt denies difficulty breathing or sob. Pt denies chest pain. Denies n/v. Tolerating diet well. Using norco for pain with good relief. nuno drains x4 compressed with serosangious drainage in drains. Drain care reviewed with pt. Pt voiding with good output. Ambulating in room without difficulty. Iv removed, site intact. Discharge instructions reviewed with pt and pt shows understanding. rx for keflex, zofran, norco, and colace sent to patient's pharmacy. Patient shows understanding and discharged in stable condition.

## 2022-07-14 NOTE — PROGRESS NOTES
Vss. Pt resting in bed. Pt on ra with 02 sats wnl. Lungs cta. Pt denies difficulty breathing or sob. Pt denies chest pain. Voiding with good output. Surgical bra in place. No swelling or drainage noted. nuno drains x4 compressed with serosanguinous drainage present. Iv sl, site intact. Pt using norco for pain with good relief. Voiding with good output. poc updated with pt regarding discharge planning and pt shows understanding. Will monitor.

## 2022-07-14 NOTE — PROGRESS NOTES
Drain care reviewed with pt including emptying drains, milking drains, and how to change dressing to drain sites after shower if they become wet. Drain care supplies given. Patient able to empty drains and milk drains without difficulty. Pt shows understanding. Will monitor.

## 2022-07-14 NOTE — OPERATIVE REPORT
[facility]    PATIENT'S NAME: Branden Lim   ATTENDING PHYSICIAN: Angélica Vazquez. Rashmi Braxton M.D. OPERATING PHYSICIAN: Angélica Vazquez. Rashmi Braxton M.D. PATIENT ACCOUNT#:   [de-identified]    LOCATION:  [facility] OR [facility] 10 [facility] 10  MEDICAL RECORD #:   [id number]       YOB: 1975  ADMISSION DATE:       07/13/2022      OPERATION DATE:  07/13/2022    OPERATIVE REPORT    PREOPERATIVE DIAGNOSIS:  Ductal carcinoma in situ of the right breast.  POSTOPERATIVE DIAGNOSIS:  Ductal carcinoma in situ of the right breast.  PROCEDURE:  Bilateral skin-sparing mastectomies with right breast injection of blue dye for sentinel lymph node identification and right sentinel lymph node biopsy. ASSISTANT:  Patricia Tang CSA     ANESTHESIA:  General anesthesia and bilateral pectoral nerve blocks placed per Anesthesia. ESTIMATED BLOOD LOSS:  50 mL for my portion of the procedure. DRAINS:  Placed per Plastic Surgery. COMPLICATIONS:  No immediate complications. DISPOSITION:  Stable and remains in OR for reconstruction with Dr. Prashant Salazar. INDICATIONS:  The patient is a 46-year-old female who presented with an abnormal mammogram, had an excisional biopsy of discordant benign results that confirmed microinvasive DCIS. In light of this, she is concerned about future breast cancer risk and recurrence, and is electing for bilateral mastectomy for maximal risk reduction. Because of her desire for immediate reconstruction as well as skin preservation, the unusual procedure of skin-sparing mastectomy instead of the usual procedure of simple mastectomy was necessary. This required additional time, efforts, and increased complexity in order to allow for adequate skin perfusion to accommodate the immediate reconstruction. This also required surgical assistance in order to allow for adequate exposure to complete this more difficult operation through a more challenging incision.   Risks and possible complications were discussed with the patient including, but not limited to, infection, bleeding, injury to surrounding structures, possible need for reoperation. She agreed to the proposed surgery. The approach to dean staging was also described including the technique of sentinel node biopsy, possible need for axillary dissection, and long-term sequelae of the procedure. OPERATIVE TECHNIQUE:  Patient had undergone injection of radioisotope as well as lymphoscintigraphy in preoperatively increments to the right breast.  She was brought to the OR, placed in supine position, properly padded and secured, given a dose of IV antibiotics. Sequential compression devices were applied to the legs for DVT prophylaxis. General anesthesia was introduced. A Ott catheter was placed. Bilateral pectoral nerve blocks were placed for anesthesia. Diluted methylene blue dye was injected in a periareolar location to the right breast, massaged for approximately 5 minutes. Bilateral breasts and arms were prepped and draped in usual sterile fashion. Circumareolar incisions had been marked preoperatively by Dr. Kyaw Olivera. The right was incised with a 15-blade knife for the skin. Using sharp dissection and electrocautery, mastectomy flaps were raised to the borders of the clavicle, sternum, inframammary fold, and latissimus tendon laterally. Her right breast was dissected off the underlying muscle with electrocautery, including en bloc excision of the pectoralis fascia. It was oriented with a stitch at the axillary tail and sent for permanent pathologic evaluation. The hand-held gamma counter was inserted through this incision toward the axilla and dissection through the clavipectoral fascia identified for sentinel node. This was sent for frozen section, the results of which were negative for the presence of metastatic disease and, therefore, no completion axillary lymph node dissection was performed. The wound was irrigated.   Hemostasis was assured with electrocautery and hemoclips, and a moist laparotomy pad was left in place for her reconstruction. Attention was taken toward the left breast.  A symmetrical circumareolar incision was incised with a 15-blade knife for the skin. Mastectomy flaps were raised to the borders of the clavicle, sternum, inframammary fold, and latissimus tendon laterally. The left breast was then dissected off the underlying muscle with electrocautery, including en bloc excision of the pectoralis fascia. It was oriented with a stitch at the axillary tail and sent for permanent pathologic evaluation. The wound was irrigated and hemostasis assured with electrocautery and hemoclips, and a moist laparotomy pad was left in place for the remainder of the surgery, which will be dictated separately by Dr. Priscilla Logan. All counts were correct at the conclusion of my portion of the procedure. Estimated blood loss for my portion was 50 mL. Patient was hemodynamically stable upon my exit from the OR. Dictated By Franco Gonzalez. Marco Jones M.D.  d: 07/13/2022 09:18:30  t: 07/13/2022 09:35:13  Job [id number]  K/    cc: SD Mathews PA-C

## 2022-07-15 ENCOUNTER — NURSE NAVIGATOR ENCOUNTER (OUTPATIENT)
Dept: HEMATOLOGY/ONCOLOGY | Facility: HOSPITAL | Age: 47
End: 2022-07-15

## 2022-07-15 NOTE — PROGRESS NOTES
Called patient, post op day #2. Says overall doing well, pain is well controlled. She has not yet looked at incision, plans to do this tonight or tomorrow morning when she takes a shower. Managing drains well. Is aware of her follow up appointment.

## 2022-07-19 ENCOUNTER — OFFICE VISIT (OUTPATIENT)
Dept: SURGERY | Facility: CLINIC | Age: 47
End: 2022-07-19
Payer: COMMERCIAL

## 2022-07-19 ENCOUNTER — NURSE NAVIGATOR ENCOUNTER (OUTPATIENT)
Dept: HEMATOLOGY/ONCOLOGY | Facility: HOSPITAL | Age: 47
End: 2022-07-19

## 2022-07-19 VITALS
SYSTOLIC BLOOD PRESSURE: 112 MMHG | RESPIRATION RATE: 16 BRPM | TEMPERATURE: 98 F | BODY MASS INDEX: 28 KG/M2 | DIASTOLIC BLOOD PRESSURE: 69 MMHG | WEIGHT: 157 LBS | HEART RATE: 85 BPM | OXYGEN SATURATION: 98 %

## 2022-07-19 DIAGNOSIS — D05.10: Primary | ICD-10-CM

## 2022-07-19 DIAGNOSIS — R52 PAIN: ICD-10-CM

## 2022-07-19 PROCEDURE — 3078F DIAST BP <80 MM HG: CPT | Performed by: SURGERY

## 2022-07-19 PROCEDURE — 3074F SYST BP LT 130 MM HG: CPT | Performed by: SURGERY

## 2022-07-19 PROCEDURE — 99024 POSTOP FOLLOW-UP VISIT: CPT | Performed by: SURGERY

## 2022-07-19 RX ORDER — HYDROCODONE BITARTRATE AND ACETAMINOPHEN 5; 325 MG/1; MG/1
1 TABLET ORAL EVERY 4 HOURS PRN
Qty: 20 TABLET | Refills: 0 | Status: SHIPPED | OUTPATIENT
Start: 2022-07-19

## 2022-07-19 NOTE — PROGRESS NOTES
Met with patient during Dr. Jayesh Macario clinic. Discussed with her if she wanted to meet with medical oncologist or not and she stated that Dr. Abdiaziz Feliz suggested that she did not need to due to her surgical pathology results showed no residual carcinoma. She stated she is doing really well after her bilateral mastectomies with reconstruction. She stated that the pre-op mastectomy class was beneficial for her in taking care of herself and her drains. She thanked me for the assistance and was provided with the breast nurse navigators contact information and was encouraged to phone with any other questions or concerns.

## 2022-07-22 ENCOUNTER — NURSE ONLY (OUTPATIENT)
Dept: SURGERY | Facility: CLINIC | Age: 47
End: 2022-07-22
Payer: COMMERCIAL

## 2022-07-22 NOTE — PROGRESS NOTES
Patient presents today for a post-op nurse visit for surgical drain removal.    Patient had a Bilateral breast nipple versus skin sparing mastectomies, right lymphoscintigraphy, right sentinel lymph node biopsy (with Dr Pearlean Hammans) Immediate breast reconstruction with placement of bilateral breast tissue expanders, acellular dermal matrix (with Dr Samuel Abbott) on 7/13/2022. Drains 2 and 4 output were within range for removal.  Both drains removed. Area cleansed and neosporin/gauze applied. Surgical bra in place. Patient has appointment with DEMETRIS Nicole on 7/25. All questions were answered. Encouraged to call or MyChart message with any other questions or concerns.

## 2022-07-25 ENCOUNTER — OFFICE VISIT (OUTPATIENT)
Dept: SURGERY | Facility: CLINIC | Age: 47
End: 2022-07-25
Payer: COMMERCIAL

## 2022-07-25 DIAGNOSIS — D05.10: Primary | ICD-10-CM

## 2022-07-25 NOTE — PROGRESS NOTES
Fang Baca is a 40year old female who presents today for a follow-up after bilateral skin sparing mastectomies with right sentinel lymph node biopsy (Dr. Leanne Barker) and immediate bilateral breast reconstruction with partial submuscular tissue expander placement and acellular dermal matrix, 250 cc of saline bilaterally with Dr. Pb Summers on 7/13/2022. She denies fever and chills. She denies nausea, vomiting, diarrhea or constipation. Her pain is controlled. All of her drains have been removed. She has been compliant with compression, activity restrictions and taking her antibiotic. She takes Norco sparingly for pain relief. Physical Exam     Breasts: Bilateral breast incisions clean, dry and intact without wound drainage or wound dehiscence. Bilateral mastectomy skin is without erythema, ecchymosis, skin breakdown, necrosis. No evidence of hematoma or seroma. Drain sites clean, dry and intact. There were no vitals filed for this visit. Assessment and Plan     Fang Baca is doing well s/p bilateral skin sparing mastectomies with right sentinel lymph node biopsy (Dr. Leanne Barker) and immediate bilateral breast reconstruction with partial submuscular tissue expander placement and acellular dermal matrix, 250 cc of saline bilaterally with Dr. Pb Summers on 7/13/2022. New Steri-Strips were placed over the incisions today. She will keep these in place until her follow-up visit. If they fall off she can apply Neosporin to the incisions. I recommend she continue with compression and activity restrictions. She can discontinue her oral antibiotic. We reviewed reasons to contact our office. She is interested in implant-based reconstruction and reports she does not require chemotherapy or radiation. She was given a prescription for lymphedema therapy to start in the next 1 to 2 weeks to start gradually working on range of motion as well as soft tissue swelling.   She will follow-up August 9 with  Eileen. Questions were answered. Patient understands.      Roger Arenas  7/25/2022  10:50 AM

## 2022-08-02 DIAGNOSIS — D05.10: Primary | ICD-10-CM

## 2022-08-04 ENCOUNTER — PATIENT MESSAGE (OUTPATIENT)
Dept: INTERNAL MEDICINE CLINIC | Facility: CLINIC | Age: 47
End: 2022-08-04

## 2022-08-04 RX ORDER — RAMIPRIL 2.5 MG/1
2.5 CAPSULE ORAL NIGHTLY
Qty: 90 CAPSULE | Refills: 0 | Status: SHIPPED | OUTPATIENT
Start: 2022-08-04

## 2022-08-04 NOTE — TELEPHONE ENCOUNTER
From: Karyna Screen  To: Nakul Alvarenga PA-C  Sent: 8/4/2022 12:39 AM CDT  Subject: Refill    Hello,  Would you be able to call a RX in to my pharmacy for a refill of Ramiptil 2.5 mg? I have 4 pills left.   Thank you,  Cherry Mcdowell

## 2022-08-04 NOTE — TELEPHONE ENCOUNTER
Last time medication was refilled: Filled by previous PCP with [facility].    Last OV 6/20/22  Next OV none scheduled  Per protocol to provider

## 2022-08-09 ENCOUNTER — OFFICE VISIT (OUTPATIENT)
Dept: SURGERY | Facility: CLINIC | Age: 47
End: 2022-08-09
Payer: COMMERCIAL

## 2022-08-09 DIAGNOSIS — Z90.13 ABSENCE OF BREAST, ACQUIRED, BILATERAL: Primary | ICD-10-CM

## 2022-08-09 PROCEDURE — 99024 POSTOP FOLLOW-UP VISIT: CPT | Performed by: SURGERY

## 2022-08-09 NOTE — PROGRESS NOTES
Tess Cardona is a 40year old female who presents today for a follow-up. She denies fever and chills. She denies nausea, vomiting, diarrhea or constipation. She currently has a 13 cm base diameter tissue expander filled to 250 cc of saline. Physical Examination:  Breasts: Bilateral breast incisions are clean dry and intact. Procedure: Bilateral breasts are sterilely expanded with 60 cc of saline with total of 310 cc. No seroma fluid was encountered bilaterally. Assessment and Plan:  Patient is doing well. She will continue her range of motion exercises as per physical therapy. She will follow-up in 1 week for further expansion.

## 2022-08-12 DIAGNOSIS — E10.9 TYPE 1 DIABETES MELLITUS WITHOUT COMPLICATIONS (HCC): ICD-10-CM

## 2022-08-12 RX ORDER — ROSUVASTATIN CALCIUM 20 MG/1
TABLET, COATED ORAL
Qty: 90 TABLET | Refills: 1 | Status: SHIPPED | OUTPATIENT
Start: 2022-08-12

## 2022-08-15 ENCOUNTER — OFFICE VISIT (OUTPATIENT)
Dept: INTERNAL MEDICINE CLINIC | Facility: CLINIC | Age: 47
End: 2022-08-15
Payer: COMMERCIAL

## 2022-08-15 VITALS
OXYGEN SATURATION: 98 % | HEART RATE: 100 BPM | DIASTOLIC BLOOD PRESSURE: 60 MMHG | WEIGHT: 163 LBS | RESPIRATION RATE: 16 BRPM | SYSTOLIC BLOOD PRESSURE: 100 MMHG | HEIGHT: 63 IN | BODY MASS INDEX: 28.88 KG/M2 | TEMPERATURE: 99 F

## 2022-08-15 DIAGNOSIS — Z00.00 ROUTINE PHYSICAL EXAMINATION: Primary | ICD-10-CM

## 2022-08-15 DIAGNOSIS — C50.911 MALIGNANT NEOPLASM OF RIGHT FEMALE BREAST, UNSPECIFIED ESTROGEN RECEPTOR STATUS, UNSPECIFIED SITE OF BREAST (HCC): ICD-10-CM

## 2022-08-15 DIAGNOSIS — R80.9 MICROALBUMINURIA DUE TO TYPE 1 DIABETES MELLITUS (HCC): ICD-10-CM

## 2022-08-15 DIAGNOSIS — E10.69 HYPERLIPIDEMIA DUE TO TYPE 1 DIABETES MELLITUS (HCC): ICD-10-CM

## 2022-08-15 DIAGNOSIS — E10.29 MICROALBUMINURIA DUE TO TYPE 1 DIABETES MELLITUS (HCC): ICD-10-CM

## 2022-08-15 DIAGNOSIS — Z12.11 SCREEN FOR COLON CANCER: ICD-10-CM

## 2022-08-15 DIAGNOSIS — E78.5 HYPERLIPIDEMIA DUE TO TYPE 1 DIABETES MELLITUS (HCC): ICD-10-CM

## 2022-08-15 DIAGNOSIS — E10.65 TYPE 1 DIABETES MELLITUS WITH HYPERGLYCEMIA (HCC): ICD-10-CM

## 2022-08-15 NOTE — PATIENT INSTRUCTIONS
Schedule colonoscopy with Dr Gabriela Cheek   Have blood drawn today  Please have Dr Keshia Hercules forward results of your eye exam in October

## 2022-08-18 ENCOUNTER — NURSE ONLY (OUTPATIENT)
Dept: SURGERY | Facility: CLINIC | Age: 47
End: 2022-08-18
Payer: COMMERCIAL

## 2022-08-18 NOTE — PROGRESS NOTES
Modesta Garay is a 40year old female who presents today for a follow-up after bilateral skin sparing mastectomies with right sentinel lymph node biopsy (Dr. Carol Grace) and immediate bilateral breast reconstruction with partial submuscular tissue expander placement and acellular dermal matrix, 250 cc of saline bilaterally with Dr. Kathy Singh on 7/13/2022. She denies fever and chills. She denies nausea, vomiting, diarrhea or constipation. Her pain is controlled. Patient has been complaint with compression and activity restrictions. Physical Exam     Surgical incisions are clean, dry, and intact. No erythema, no wound drainage. Breasts: Bilateral breast incisions clean, dry and intact without wound drainage or wound dehiscence. Bilateral mastectomy skin is without erythema, ecchymosis, skin breakdown, necrosis. No evidence of hematoma or seroma. Drain sites clean, dry and intact. There were no vitals filed for this visit. Assessment and Plan     Modesta Garay is doing well s/p bilateral skin sparing mastectomies with right sentinel lymph node biopsy (Dr. Carol Grace) and immediate bilateral breast reconstruction with partial submuscular tissue expander placement and acellular dermal matrix, 250 cc of saline bilaterally with Dr. Kathy Singh on 7/13/2022    Patient is here for continued expansion and is at a current total of 310mL. The bilateral breast ports were identified with an external magnet and prepped with betadine solution. A sterile butterfly needle was sterilely introduced. 60mL of saline was added bilaterally. Patient tolerated this well. This brings the patient to a bilateral total of  370mL. No seroma was encountered. Neosporin and a band-aid was placed. Patient was encouraged to continue compression. Discussed process of second stage reconstruction. Patient had many questions about the cosmetic outcome.  I informed her I could show her reference photos, patient states she will wait to talk to Dr. Wilma Nelson pre-op discussion. Patient will follow up in 1-2 weeks with Dr. Joselo Whiteside for possible final fill, possible pre-op discussion for second stage reconstruction (implant based reconstruction with out fat grafting). Questions were answered. Patient understands.      Collette Alva RN  8/18/2022  8:54 AM

## 2022-08-26 ENCOUNTER — OFFICE VISIT (OUTPATIENT)
Dept: SURGERY | Facility: CLINIC | Age: 47
End: 2022-08-26
Payer: COMMERCIAL

## 2022-08-26 DIAGNOSIS — Z90.13 ABSENCE OF BREAST, ACQUIRED, BILATERAL: Primary | ICD-10-CM

## 2022-08-26 PROCEDURE — 99024 POSTOP FOLLOW-UP VISIT: CPT | Performed by: SURGERY

## 2022-08-26 NOTE — PROGRESS NOTES
Marly Ca is a 40year old female who presents today for a follow-up. She currently has a 13 cm base diameter tissue expander filled to 370 cc. Physical Examination:  Breasts: Bilateral breast incisions are clean dry and intact. Mild edema of the right breast skin is noted. There is no erythema or seroma noted. Procedure: Bilateral breasts are sterilely expanded with 60 cc of saline with total of 430 cc. No seroma fluid was encountered. Assessment and Plan:  Patient is doing well. She is content with her current expansion. She was instructed to massage the areas of edema. She will follow-up in approximately 3 weeks for reassessment. The plan was reviewed with the patient and questions were answered.

## 2022-09-12 NOTE — PROGRESS NOTES
Stew Diego is a 40year old female who presents today for a follow-up. She currently has a 13 cm base diameter tissue expander filled to 430 cc. She has been performing scar massage. Physical Examination:  Breasts: Bilateral breast incisions are clean dry and intact. The edema of the medial skin bilaterally is significantly improved. Abdomen: Moderate upper abdominal lipodystrophy is noted. There are no palpable hernias noted. Assessment and Plan:  We discussed the plan for the second stage which will include removal of bilateral breast tissue expanders, placement of smooth, round, silicone implants, and fat grafting to bilateral reconstructed breasts. The nature of the procedure was reviewed with the patient. We discussed the risks of surgery including but not limited to bleeding, infection, scarring, delayed wound healing, asymmetry, implant malposition, implant infection or extrusion requiring removal, ALCL, capsular contracture, hypertrophic scarring or keloid, injury to intra-abdominal structures, contour abnormalities, cysts or calcifications requiring biopsy, and need for further surgery. We reviewed the expected postoperative course including possible need for drains, as well as need for activity limitation and compression. Multiple questions were answered the patient's satisfaction. No guarantees as to outcome were offered. The patient expresses understanding and wishes to proceed.

## 2022-09-13 ENCOUNTER — OFFICE VISIT (OUTPATIENT)
Dept: SURGERY | Facility: CLINIC | Age: 47
End: 2022-09-13
Payer: COMMERCIAL

## 2022-09-13 DIAGNOSIS — Z90.13 ABSENCE OF BREAST, ACQUIRED, BILATERAL: Primary | ICD-10-CM

## 2022-09-13 PROCEDURE — 99024 POSTOP FOLLOW-UP VISIT: CPT | Performed by: SURGERY

## 2022-09-13 NOTE — PATIENT INSTRUCTIONS
Surgeon:         Dr. Gerard Mazariegos                                        Tel:         [phone]                                  Fax:        [phone]    Surgery/Procedure:     Removal of bilateral breast tissue expanders with placement of permanent implant and autologous fat grafting. 3 hours, general anesthesia,outpatinet. Hospital:  [facility]: [place] [address] [address], [place], [address]           [phone]  [facility]: [address], [facility], [facility]               [phone]    1. Someone will need to drive you to and from the hospital if your procedure is outpatient. 2.Do not drink alcohol or smoke 24 hours prior to your procedure. 3. Bring a picture ID and your insurance card. 4. You will be contacted by the hospital the day before to confirm the procedure time and location. 5. The hospital will also contact you approximately one week before surgery to schedule your COVID test.     6. Do not take any herbal supplements or blood thinners at least one week before your procedure/surgery. This includes NSAID's (aspirin, baby aspirin, Motrin, Ibuprofen, Aleve, Advil, Naproxen, etc), Plavix, fish oil, vitamin E, turmeric, CoQ10, or green tea supplements, etc. *TYLENOL or acetaminophen is ok to take*    7. PRE-OPERATIVE TESTING: History and physical with medical clearance is REQUIRED within 30 days of the surgery date and is mandatory per Dr. Trish Ramirez. *If this is not done, your surgery will be postponed*  MEDICAL CLEARANCE WITH DR. SUMMERS  CBC  CMP  EKG  HgbA1C    8. Please inform us if you develop any Covid-19 like symptoms, test positive or have been exposed for Covid- 19 prior to surgery.      Consent obtained   Photos taken on 9/13/22

## 2022-09-14 ENCOUNTER — TELEPHONE (OUTPATIENT)
Dept: SURGERY | Facility: CLINIC | Age: 47
End: 2022-09-14

## 2022-09-14 DIAGNOSIS — Z90.13 ABSENCE OF BREAST, ACQUIRED, BILATERAL: Primary | ICD-10-CM

## 2022-09-14 NOTE — TELEPHONE ENCOUNTER
Calling pt in regards to scheduling surgery. Informed pt that I have 12/07/2022 available at [facility] with Dr. Samuel Abbott. Pt verbalized understanding and in agreement with date and location. All questions answered. Encouraged pt to call or "[employer] message office with any other questions or concerns.

## 2022-09-15 ENCOUNTER — TELEPHONE (OUTPATIENT)
Dept: INTERNAL MEDICINE CLINIC | Facility: CLINIC | Age: 47
End: 2022-09-15

## 2022-10-04 ENCOUNTER — TELEPHONE (OUTPATIENT)
Dept: SURGERY | Facility: CLINIC | Age: 47
End: 2022-10-04

## 2022-10-06 ENCOUNTER — NURSE NAVIGATOR ENCOUNTER (OUTPATIENT)
Dept: HEMATOLOGY/ONCOLOGY | Facility: HOSPITAL | Age: 47
End: 2022-10-06

## 2022-10-09 DIAGNOSIS — E10.9 TYPE 1 DIABETES MELLITUS WITHOUT COMPLICATION (HCC): ICD-10-CM

## 2022-10-10 RX ORDER — INSULIN ASPART 100 [IU]/ML
INJECTION, SOLUTION INTRAVENOUS; SUBCUTANEOUS
Qty: 70 ML | Refills: 0 | Status: SHIPPED | OUTPATIENT
Start: 2022-10-10

## 2022-10-10 NOTE — TELEPHONE ENCOUNTER
Last time medication was refilled 7/7/22  Quantity and # of refills 70 mL w/0 refills   Last OV  8/15/22  Next OV   Future Appointments   Date Time Provider Alexx Salguero   11/16/2022 10:00 AM Tila Hu PA-C [facility] [address]   11/18/2022  7:30 AM Janeth Rodriguez,  [facility]     Per protocol to provider

## 2022-10-20 ENCOUNTER — TELEPHONE (OUTPATIENT)
Dept: SURGERY | Facility: CLINIC | Age: 47
End: 2022-10-20

## 2022-10-20 NOTE — TELEPHONE ENCOUNTER
Patient called with questions about her upcoming surgery on 12/7/22 with Dr. Aniyah Brantley. patient expressed that she feels as though her tissue expanders are too large and doesn't want her permanent implants to be as large. I informed patient that she can come into the office to discuss this with Dr. Aniyah Brantley. Patient was transferred to the [facility] to schedule a follow up appt.

## 2022-11-01 RX ORDER — RAMIPRIL 2.5 MG/1
2.5 CAPSULE ORAL NIGHTLY
Qty: 90 CAPSULE | Refills: 0 | Status: SHIPPED | OUTPATIENT
Start: 2022-11-01

## 2022-11-16 ENCOUNTER — LAB ENCOUNTER (OUTPATIENT)
Dept: LAB | Age: 47
End: 2022-11-16
Attending: PHYSICIAN ASSISTANT
Payer: COMMERCIAL

## 2022-11-16 ENCOUNTER — OFFICE VISIT (OUTPATIENT)
Dept: INTERNAL MEDICINE CLINIC | Facility: CLINIC | Age: 47
End: 2022-11-16
Payer: COMMERCIAL

## 2022-11-16 VITALS
HEART RATE: 90 BPM | TEMPERATURE: 99 F | RESPIRATION RATE: 16 BRPM | SYSTOLIC BLOOD PRESSURE: 110 MMHG | BODY MASS INDEX: 27.64 KG/M2 | HEIGHT: 63 IN | DIASTOLIC BLOOD PRESSURE: 82 MMHG | OXYGEN SATURATION: 98 % | WEIGHT: 156 LBS

## 2022-11-16 DIAGNOSIS — E10.65 TYPE 1 DIABETES MELLITUS WITH HYPERGLYCEMIA (HCC): ICD-10-CM

## 2022-11-16 DIAGNOSIS — Z01.818 PREOP EXAMINATION: Primary | ICD-10-CM

## 2022-11-16 DIAGNOSIS — C50.911 MALIGNANT NEOPLASM OF RIGHT FEMALE BREAST, UNSPECIFIED ESTROGEN RECEPTOR STATUS, UNSPECIFIED SITE OF BREAST (HCC): ICD-10-CM

## 2022-11-16 DIAGNOSIS — R80.9 MICROALBUMINURIA DUE TO TYPE 1 DIABETES MELLITUS (HCC): ICD-10-CM

## 2022-11-16 DIAGNOSIS — Z01.818 PREOP EXAMINATION: ICD-10-CM

## 2022-11-16 DIAGNOSIS — E10.29 MICROALBUMINURIA DUE TO TYPE 1 DIABETES MELLITUS (HCC): ICD-10-CM

## 2022-11-16 PROBLEM — Z28.21 INFLUENZA VACCINATION DECLINED BY PATIENT: Status: RESOLVED | Noted: 2020-09-11 | Resolved: 2022-11-16

## 2022-11-16 LAB
ALBUMIN SERPL-MCNC: 3.9 G/DL (ref 3.4–5)
ALBUMIN/GLOB SERPL: 1.3 {RATIO} (ref 1–2)
ALP LIVER SERPL-CCNC: 80 U/L
ALT SERPL-CCNC: 46 U/L
ANION GAP SERPL CALC-SCNC: 4 MMOL/L (ref 0–18)
AST SERPL-CCNC: 23 U/L (ref 15–37)
BASOPHILS # BLD AUTO: 0.11 X10(3) UL (ref 0–0.2)
BASOPHILS NFR BLD AUTO: 1.7 %
BILIRUB SERPL-MCNC: 0.5 MG/DL (ref 0.1–2)
BUN BLD-MCNC: 13 MG/DL (ref 7–18)
CALCIUM BLD-MCNC: 9.4 MG/DL (ref 8.5–10.1)
CHLORIDE SERPL-SCNC: 110 MMOL/L (ref 98–112)
CO2 SERPL-SCNC: 27 MMOL/L (ref 21–32)
CREAT BLD-MCNC: 0.8 MG/DL
EOSINOPHIL # BLD AUTO: 0.21 X10(3) UL (ref 0–0.7)
EOSINOPHIL NFR BLD AUTO: 3.2 %
ERYTHROCYTE [DISTWIDTH] IN BLOOD BY AUTOMATED COUNT: 13.5 %
EST. AVERAGE GLUCOSE BLD GHB EST-MCNC: 209 MG/DL (ref 68–126)
FASTING STATUS PATIENT QL REPORTED: YES
GFR SERPLBLD BASED ON 1.73 SQ M-ARVRAT: 92 ML/MIN/1.73M2 (ref 60–?)
GLOBULIN PLAS-MCNC: 3.1 G/DL (ref 2.8–4.4)
GLUCOSE BLD-MCNC: 58 MG/DL (ref 70–99)
HBA1C MFR BLD: 8.9 % (ref ?–5.7)
HCT VFR BLD AUTO: 40.2 %
HGB BLD-MCNC: 13.2 G/DL
IMM GRANULOCYTES # BLD AUTO: 0.03 X10(3) UL (ref 0–1)
IMM GRANULOCYTES NFR BLD: 0.5 %
LYMPHOCYTES # BLD AUTO: 1.75 X10(3) UL (ref 1–4)
LYMPHOCYTES NFR BLD AUTO: 26.4 %
MCH RBC QN AUTO: 31.4 PG (ref 26–34)
MCHC RBC AUTO-ENTMCNC: 32.8 G/DL (ref 31–37)
MCV RBC AUTO: 95.5 FL
MONOCYTES # BLD AUTO: 0.43 X10(3) UL (ref 0.1–1)
MONOCYTES NFR BLD AUTO: 6.5 %
NEUTROPHILS # BLD AUTO: 4.11 X10 (3) UL (ref 1.5–7.7)
NEUTROPHILS # BLD AUTO: 4.11 X10(3) UL (ref 1.5–7.7)
NEUTROPHILS NFR BLD AUTO: 61.7 %
OSMOLALITY SERPL CALC.SUM OF ELEC: 290 MOSM/KG (ref 275–295)
PLATELET # BLD AUTO: 358 10(3)UL (ref 150–450)
POTASSIUM SERPL-SCNC: 4.5 MMOL/L (ref 3.5–5.1)
PROT SERPL-MCNC: 7 G/DL (ref 6.4–8.2)
RBC # BLD AUTO: 4.21 X10(6)UL
SODIUM SERPL-SCNC: 141 MMOL/L (ref 136–145)
WBC # BLD AUTO: 6.6 X10(3) UL (ref 4–11)

## 2022-11-16 PROCEDURE — 85025 COMPLETE CBC W/AUTO DIFF WBC: CPT

## 2022-11-16 PROCEDURE — 83036 HEMOGLOBIN GLYCOSYLATED A1C: CPT

## 2022-11-16 PROCEDURE — 3079F DIAST BP 80-89 MM HG: CPT | Performed by: PHYSICIAN ASSISTANT

## 2022-11-16 PROCEDURE — 93000 ELECTROCARDIOGRAM COMPLETE: CPT | Performed by: PHYSICIAN ASSISTANT

## 2022-11-16 PROCEDURE — 80053 COMPREHEN METABOLIC PANEL: CPT

## 2022-11-16 PROCEDURE — 3008F BODY MASS INDEX DOCD: CPT | Performed by: PHYSICIAN ASSISTANT

## 2022-11-16 PROCEDURE — 36415 COLL VENOUS BLD VENIPUNCTURE: CPT

## 2022-11-16 PROCEDURE — 99214 OFFICE O/P EST MOD 30 MIN: CPT | Performed by: PHYSICIAN ASSISTANT

## 2022-11-16 PROCEDURE — 3074F SYST BP LT 130 MM HG: CPT | Performed by: PHYSICIAN ASSISTANT

## 2022-11-18 PROBLEM — Z86.010 HISTORY OF ADENOMATOUS POLYP OF COLON: Status: ACTIVE | Noted: 2022-11-18

## 2022-11-18 PROBLEM — Z86.0101 HISTORY OF ADENOMATOUS POLYP OF COLON: Status: ACTIVE | Noted: 2022-11-18

## 2022-11-29 ENCOUNTER — OFFICE VISIT (OUTPATIENT)
Dept: SURGERY | Facility: CLINIC | Age: 47
End: 2022-11-29
Payer: COMMERCIAL

## 2022-11-29 PROCEDURE — 99212 OFFICE O/P EST SF 10 MIN: CPT | Performed by: SURGERY

## 2022-11-29 NOTE — PROGRESS NOTES
David Bautista is a 56year old female who presents today for a follow-up. She has multiple questions regarding her upcoming second stage reconstruction. The patient also relates that she would like to be smaller and more narrow than her current breast size. Physical Examination:  Breasts: Bilateral breast incisions are well-healed. Moderate lateral displacement the implants is noted. Moderate soft tissue excess is noted in the medial aspect of the breast bilaterally. Assessment and Plan:  In the surgical plan for removal of bilateral breast tissue expanders, placement if needed, round, silicone implants and fat grafting about reconstructive breast was reviewed. Given the patient's desire to be smaller we discussed performing a lateral capsulorrhaphy bilaterally. The risk, benefits, and alternatives of surgery were reviewed. Multiple questions were answered to the patient satisfaction. She expresses understanding and wishes to proceed.

## 2022-12-01 ENCOUNTER — TELEPHONE (OUTPATIENT)
Dept: SURGERY | Facility: CLINIC | Age: 47
End: 2022-12-01

## 2022-12-06 ENCOUNTER — LAB ENCOUNTER (OUTPATIENT)
Dept: LAB | Facility: HOSPITAL | Age: 47
End: 2022-12-06
Attending: SURGERY
Payer: COMMERCIAL

## 2022-12-06 DIAGNOSIS — Z01.818 PRE-OP TESTING: ICD-10-CM

## 2022-12-06 LAB — SARS-COV-2 RNA RESP QL NAA+PROBE: NOT DETECTED

## 2022-12-07 ENCOUNTER — HOSPITAL ENCOUNTER (OUTPATIENT)
Facility: HOSPITAL | Age: 47
Setting detail: HOSPITAL OUTPATIENT SURGERY
Discharge: HOME | End: 2022-12-07
Attending: SURGERY | Admitting: SURGERY
Payer: COMMERCIAL

## 2022-12-07 ENCOUNTER — ANESTHESIA EVENT (OUTPATIENT)
Dept: SURGERY | Facility: HOSPITAL | Age: 47
End: 2022-12-07
Payer: COMMERCIAL

## 2022-12-07 ENCOUNTER — ANESTHESIA (OUTPATIENT)
Dept: SURGERY | Facility: HOSPITAL | Age: 47
End: 2022-12-07
Payer: COMMERCIAL

## 2022-12-07 VITALS
DIASTOLIC BLOOD PRESSURE: 56 MMHG | TEMPERATURE: 98 F | SYSTOLIC BLOOD PRESSURE: 111 MMHG | OXYGEN SATURATION: 94 % | BODY MASS INDEX: 27.64 KG/M2 | HEIGHT: 63 IN | HEART RATE: 90 BPM | WEIGHT: 156 LBS | RESPIRATION RATE: 16 BRPM

## 2022-12-07 DIAGNOSIS — C50.911 MALIGNANT NEOPLASM OF RIGHT FEMALE BREAST, UNSPECIFIED ESTROGEN RECEPTOR STATUS, UNSPECIFIED SITE OF BREAST (HCC): ICD-10-CM

## 2022-12-07 DIAGNOSIS — Z90.13 ABSENCE OF BREAST, ACQUIRED, BILATERAL: ICD-10-CM

## 2022-12-07 DIAGNOSIS — Z01.818 PRE-OP TESTING: Primary | ICD-10-CM

## 2022-12-07 LAB
B-HCG UR QL: NEGATIVE
GLUCOSE BLD-MCNC: 142 MG/DL (ref 70–99)
GLUCOSE BLD-MCNC: 155 MG/DL (ref 70–99)
GLUCOSE BLD-MCNC: 229 MG/DL (ref 70–99)
GLUCOSE BLD-MCNC: 245 MG/DL (ref 70–99)

## 2022-12-07 PROCEDURE — 76942 ECHO GUIDE FOR BIOPSY: CPT | Performed by: ANESTHESIOLOGY

## 2022-12-07 PROCEDURE — 0HRV37Z REPLACEMENT OF BILATERAL BREAST WITH AUTOLOGOUS TISSUE SUBSTITUTE, PERCUTANEOUS APPROACH: ICD-10-PCS | Performed by: SURGERY

## 2022-12-07 PROCEDURE — 82962 GLUCOSE BLOOD TEST: CPT

## 2022-12-07 PROCEDURE — 0HPU0NZ REMOVAL OF TISSUE EXPANDER FROM LEFT BREAST, OPEN APPROACH: ICD-10-PCS | Performed by: SURGERY

## 2022-12-07 PROCEDURE — 88305 TISSUE EXAM BY PATHOLOGIST: CPT | Performed by: SURGERY

## 2022-12-07 PROCEDURE — 0HRV0JZ REPLACEMENT OF BILATERAL BREAST WITH SYNTHETIC SUBSTITUTE, OPEN APPROACH: ICD-10-PCS | Performed by: SURGERY

## 2022-12-07 PROCEDURE — 0HNV0ZZ RELEASE BILATERAL BREAST, OPEN APPROACH: ICD-10-PCS | Performed by: SURGERY

## 2022-12-07 PROCEDURE — 0JD83ZZ EXTRACTION OF ABDOMEN SUBCUTANEOUS TISSUE AND FASCIA, PERCUTANEOUS APPROACH: ICD-10-PCS | Performed by: SURGERY

## 2022-12-07 PROCEDURE — 81025 URINE PREGNANCY TEST: CPT

## 2022-12-07 PROCEDURE — 0HPT0NZ REMOVAL OF TISSUE EXPANDER FROM RIGHT BREAST, OPEN APPROACH: ICD-10-PCS | Performed by: SURGERY

## 2022-12-07 DEVICE — IMPLANTABLE DEVICE: Type: IMPLANTABLE DEVICE | Site: BREAST | Status: FUNCTIONAL

## 2022-12-07 RX ORDER — SCOLOPAMINE TRANSDERMAL SYSTEM 1 MG/1
1 PATCH, EXTENDED RELEASE TRANSDERMAL ONCE
Status: DISCONTINUED | OUTPATIENT
Start: 2022-12-07 | End: 2022-12-07 | Stop reason: HOSPADM

## 2022-12-07 RX ORDER — SODIUM CHLORIDE, SODIUM LACTATE, POTASSIUM CHLORIDE, CALCIUM CHLORIDE 600; 310; 30; 20 MG/100ML; MG/100ML; MG/100ML; MG/100ML
INJECTION, SOLUTION INTRAVENOUS CONTINUOUS
Status: DISCONTINUED | OUTPATIENT
Start: 2022-12-07 | End: 2022-12-07

## 2022-12-07 RX ORDER — ACETAMINOPHEN 500 MG
1000 TABLET ORAL ONCE AS NEEDED
Status: COMPLETED | OUTPATIENT
Start: 2022-12-07 | End: 2022-12-07

## 2022-12-07 RX ORDER — LABETALOL HYDROCHLORIDE 5 MG/ML
5 INJECTION, SOLUTION INTRAVENOUS EVERY 5 MIN PRN
Status: DISCONTINUED | OUTPATIENT
Start: 2022-12-07 | End: 2022-12-07

## 2022-12-07 RX ORDER — DEXTROSE MONOHYDRATE 25 G/50ML
50 INJECTION, SOLUTION INTRAVENOUS
Status: DISCONTINUED | OUTPATIENT
Start: 2022-12-07 | End: 2022-12-07 | Stop reason: HOSPADM

## 2022-12-07 RX ORDER — MIDAZOLAM HYDROCHLORIDE 1 MG/ML
INJECTION INTRAMUSCULAR; INTRAVENOUS AS NEEDED
Status: DISCONTINUED | OUTPATIENT
Start: 2022-12-07 | End: 2022-12-07 | Stop reason: SURG

## 2022-12-07 RX ORDER — ROCURONIUM BROMIDE 10 MG/ML
INJECTION, SOLUTION INTRAVENOUS AS NEEDED
Status: DISCONTINUED | OUTPATIENT
Start: 2022-12-07 | End: 2022-12-07 | Stop reason: SURG

## 2022-12-07 RX ORDER — DEXAMETHASONE SODIUM PHOSPHATE 4 MG/ML
VIAL (ML) INJECTION AS NEEDED
Status: DISCONTINUED | OUTPATIENT
Start: 2022-12-07 | End: 2022-12-07 | Stop reason: SURG

## 2022-12-07 RX ORDER — GLYCOPYRROLATE 0.2 MG/ML
INJECTION, SOLUTION INTRAMUSCULAR; INTRAVENOUS AS NEEDED
Status: DISCONTINUED | OUTPATIENT
Start: 2022-12-07 | End: 2022-12-07 | Stop reason: SURG

## 2022-12-07 RX ORDER — DOCUSATE SODIUM 100 MG/1
100 CAPSULE, LIQUID FILLED ORAL 2 TIMES DAILY
Qty: 30 CAPSULE | Refills: 1 | Status: SHIPPED | OUTPATIENT
Start: 2022-12-07

## 2022-12-07 RX ORDER — NICOTINE POLACRILEX 4 MG
30 LOZENGE BUCCAL
Status: DISCONTINUED | OUTPATIENT
Start: 2022-12-07 | End: 2022-12-07 | Stop reason: HOSPADM

## 2022-12-07 RX ORDER — ACETAMINOPHEN 500 MG
1000 TABLET ORAL ONCE
Status: DISCONTINUED | OUTPATIENT
Start: 2022-12-07 | End: 2022-12-07 | Stop reason: HOSPADM

## 2022-12-07 RX ORDER — PHENYLEPHRINE HCL 10 MG/ML
VIAL (ML) INJECTION AS NEEDED
Status: DISCONTINUED | OUTPATIENT
Start: 2022-12-07 | End: 2022-12-07 | Stop reason: SURG

## 2022-12-07 RX ORDER — LIDOCAINE HYDROCHLORIDE AND EPINEPHRINE 10; 10 MG/ML; UG/ML
INJECTION, SOLUTION INFILTRATION; PERINEURAL AS NEEDED
Status: DISCONTINUED | OUTPATIENT
Start: 2022-12-07 | End: 2022-12-07 | Stop reason: HOSPADM

## 2022-12-07 RX ORDER — ESMOLOL HYDROCHLORIDE 10 MG/ML
INJECTION INTRAVENOUS AS NEEDED
Status: DISCONTINUED | OUTPATIENT
Start: 2022-12-07 | End: 2022-12-07 | Stop reason: SURG

## 2022-12-07 RX ORDER — NEOSTIGMINE METHYLSULFATE 1 MG/ML
INJECTION, SOLUTION INTRAVENOUS AS NEEDED
Status: DISCONTINUED | OUTPATIENT
Start: 2022-12-07 | End: 2022-12-07 | Stop reason: SURG

## 2022-12-07 RX ORDER — HYDROCODONE BITARTRATE AND ACETAMINOPHEN 5; 325 MG/1; MG/1
1-2 TABLET ORAL EVERY 4 HOURS PRN
Qty: 20 TABLET | Refills: 0 | Status: SHIPPED | OUTPATIENT
Start: 2022-12-07

## 2022-12-07 RX ORDER — CEFAZOLIN SODIUM/WATER 2 G/20 ML
2 SYRINGE (ML) INTRAVENOUS ONCE
Status: COMPLETED | OUTPATIENT
Start: 2022-12-07 | End: 2022-12-07

## 2022-12-07 RX ORDER — NICOTINE POLACRILEX 4 MG
15 LOZENGE BUCCAL
Status: DISCONTINUED | OUTPATIENT
Start: 2022-12-07 | End: 2022-12-07 | Stop reason: HOSPADM

## 2022-12-07 RX ORDER — HYDROCODONE BITARTRATE AND ACETAMINOPHEN 5; 325 MG/1; MG/1
1 TABLET ORAL ONCE AS NEEDED
Status: COMPLETED | OUTPATIENT
Start: 2022-12-07 | End: 2022-12-07

## 2022-12-07 RX ORDER — ACETAMINOPHEN 325 MG/1
650 TABLET ORAL ONCE
Status: COMPLETED | OUTPATIENT
Start: 2022-12-07 | End: 2022-12-07

## 2022-12-07 RX ORDER — ONDANSETRON 4 MG/1
4 TABLET, FILM COATED ORAL EVERY 8 HOURS PRN
Qty: 12 TABLET | Refills: 0 | Status: SHIPPED | OUTPATIENT
Start: 2022-12-07

## 2022-12-07 RX ORDER — HYDROMORPHONE HYDROCHLORIDE 1 MG/ML
0.2 INJECTION, SOLUTION INTRAMUSCULAR; INTRAVENOUS; SUBCUTANEOUS EVERY 5 MIN PRN
Status: DISCONTINUED | OUTPATIENT
Start: 2022-12-07 | End: 2022-12-07

## 2022-12-07 RX ORDER — EPHEDRINE SULFATE 50 MG/ML
INJECTION INTRAVENOUS AS NEEDED
Status: DISCONTINUED | OUTPATIENT
Start: 2022-12-07 | End: 2022-12-07 | Stop reason: SURG

## 2022-12-07 RX ORDER — METOCLOPRAMIDE HYDROCHLORIDE 5 MG/ML
INJECTION INTRAMUSCULAR; INTRAVENOUS AS NEEDED
Status: DISCONTINUED | OUTPATIENT
Start: 2022-12-07 | End: 2022-12-07 | Stop reason: SURG

## 2022-12-07 RX ORDER — ONDANSETRON 2 MG/ML
4 INJECTION INTRAMUSCULAR; INTRAVENOUS EVERY 6 HOURS PRN
Status: DISCONTINUED | OUTPATIENT
Start: 2022-12-07 | End: 2022-12-07

## 2022-12-07 RX ORDER — CEPHALEXIN 500 MG/1
500 CAPSULE ORAL 4 TIMES DAILY
Qty: 20 CAPSULE | Refills: 0 | Status: SHIPPED | OUTPATIENT
Start: 2022-12-07 | End: 2022-12-12

## 2022-12-07 RX ORDER — MEPERIDINE HYDROCHLORIDE 25 MG/ML
12.5 INJECTION INTRAMUSCULAR; INTRAVENOUS; SUBCUTANEOUS AS NEEDED
Status: DISCONTINUED | OUTPATIENT
Start: 2022-12-07 | End: 2022-12-07

## 2022-12-07 RX ORDER — BUPIVACAINE HYDROCHLORIDE 2.5 MG/ML
INJECTION, SOLUTION EPIDURAL; INFILTRATION; INTRACAUDAL AS NEEDED
Status: DISCONTINUED | OUTPATIENT
Start: 2022-12-07 | End: 2022-12-07 | Stop reason: SURG

## 2022-12-07 RX ORDER — ACETAMINOPHEN 325 MG/1
TABLET ORAL
Status: COMPLETED
Start: 2022-12-07 | End: 2022-12-07

## 2022-12-07 RX ORDER — PROCHLORPERAZINE EDISYLATE 5 MG/ML
5 INJECTION INTRAMUSCULAR; INTRAVENOUS EVERY 8 HOURS PRN
Status: DISCONTINUED | OUTPATIENT
Start: 2022-12-07 | End: 2022-12-07

## 2022-12-07 RX ORDER — HYDROCODONE BITARTRATE AND ACETAMINOPHEN 5; 325 MG/1; MG/1
2 TABLET ORAL ONCE AS NEEDED
Status: COMPLETED | OUTPATIENT
Start: 2022-12-07 | End: 2022-12-07

## 2022-12-07 RX ORDER — ONDANSETRON 2 MG/ML
INJECTION INTRAMUSCULAR; INTRAVENOUS AS NEEDED
Status: DISCONTINUED | OUTPATIENT
Start: 2022-12-07 | End: 2022-12-07 | Stop reason: SURG

## 2022-12-07 RX ORDER — NALOXONE HYDROCHLORIDE 0.4 MG/ML
80 INJECTION, SOLUTION INTRAMUSCULAR; INTRAVENOUS; SUBCUTANEOUS AS NEEDED
Status: DISCONTINUED | OUTPATIENT
Start: 2022-12-07 | End: 2022-12-07

## 2022-12-07 RX ORDER — HYDROMORPHONE HYDROCHLORIDE 1 MG/ML
0.6 INJECTION, SOLUTION INTRAMUSCULAR; INTRAVENOUS; SUBCUTANEOUS EVERY 5 MIN PRN
Status: DISCONTINUED | OUTPATIENT
Start: 2022-12-07 | End: 2022-12-07

## 2022-12-07 RX ORDER — HYDROMORPHONE HYDROCHLORIDE 1 MG/ML
0.4 INJECTION, SOLUTION INTRAMUSCULAR; INTRAVENOUS; SUBCUTANEOUS EVERY 5 MIN PRN
Status: DISCONTINUED | OUTPATIENT
Start: 2022-12-07 | End: 2022-12-07

## 2022-12-07 RX ADMIN — BUPIVACAINE HYDROCHLORIDE 30 ML: 2.5 INJECTION, SOLUTION EPIDURAL; INFILTRATION; INTRACAUDAL at 15:06:00

## 2022-12-07 RX ADMIN — ROCURONIUM BROMIDE 20 MG: 10 INJECTION, SOLUTION INTRAVENOUS at 16:28:00

## 2022-12-07 RX ADMIN — GLYCOPYRROLATE 0.8 MG: 0.2 INJECTION, SOLUTION INTRAMUSCULAR; INTRAVENOUS at 17:43:00

## 2022-12-07 RX ADMIN — BUPIVACAINE HYDROCHLORIDE 10 ML: 2.5 INJECTION, SOLUTION EPIDURAL; INFILTRATION; INTRACAUDAL at 15:01:00

## 2022-12-07 RX ADMIN — PHENYLEPHRINE HCL 100 MCG: 10 MG/ML VIAL (ML) INJECTION at 15:54:00

## 2022-12-07 RX ADMIN — DEXAMETHASONE SODIUM PHOSPHATE 4 MG: 4 MG/ML VIAL (ML) INJECTION at 15:10:00

## 2022-12-07 RX ADMIN — SODIUM CHLORIDE, SODIUM LACTATE, POTASSIUM CHLORIDE, CALCIUM CHLORIDE: 600; 310; 30; 20 INJECTION, SOLUTION INTRAVENOUS at 15:46:00

## 2022-12-07 RX ADMIN — MIDAZOLAM HYDROCHLORIDE 2 MG: 1 INJECTION INTRAMUSCULAR; INTRAVENOUS at 14:46:00

## 2022-12-07 RX ADMIN — SODIUM CHLORIDE, SODIUM LACTATE, POTASSIUM CHLORIDE, CALCIUM CHLORIDE: 600; 310; 30; 20 INJECTION, SOLUTION INTRAVENOUS at 15:04:00

## 2022-12-07 RX ADMIN — SODIUM CHLORIDE, SODIUM LACTATE, POTASSIUM CHLORIDE, CALCIUM CHLORIDE: 600; 310; 30; 20 INJECTION, SOLUTION INTRAVENOUS at 14:43:00

## 2022-12-07 RX ADMIN — ROCURONIUM BROMIDE 20 MG: 10 INJECTION, SOLUTION INTRAVENOUS at 15:56:00

## 2022-12-07 RX ADMIN — METOCLOPRAMIDE HYDROCHLORIDE 10 MG: 5 INJECTION INTRAMUSCULAR; INTRAVENOUS at 15:10:00

## 2022-12-07 RX ADMIN — ONDANSETRON 4 MG: 2 INJECTION INTRAMUSCULAR; INTRAVENOUS at 15:10:00

## 2022-12-07 RX ADMIN — GLYCOPYRROLATE 0.2 MG: 0.2 INJECTION, SOLUTION INTRAMUSCULAR; INTRAVENOUS at 15:10:00

## 2022-12-07 RX ADMIN — CEFAZOLIN SODIUM/WATER 2 G: 2 G/20 ML SYRINGE (ML) INTRAVENOUS at 14:50:00

## 2022-12-07 RX ADMIN — PHENYLEPHRINE HCL 100 MCG: 10 MG/ML VIAL (ML) INJECTION at 17:06:00

## 2022-12-07 RX ADMIN — EPHEDRINE SULFATE 10 MG: 50 INJECTION INTRAVENOUS at 15:54:00

## 2022-12-07 RX ADMIN — ROCURONIUM BROMIDE 50 MG: 10 INJECTION, SOLUTION INTRAVENOUS at 14:50:00

## 2022-12-07 RX ADMIN — ESMOLOL HYDROCHLORIDE 50 MG: 10 INJECTION INTRAVENOUS at 17:39:00

## 2022-12-07 RX ADMIN — PHENYLEPHRINE HCL 100 MCG: 10 MG/ML VIAL (ML) INJECTION at 15:28:00

## 2022-12-07 RX ADMIN — NEOSTIGMINE METHYLSULFATE 5 MG: 1 INJECTION, SOLUTION INTRAVENOUS at 17:43:00

## 2022-12-07 RX ADMIN — PHENYLEPHRINE HCL 100 MCG: 10 MG/ML VIAL (ML) INJECTION at 16:56:00

## 2022-12-07 RX ADMIN — PHENYLEPHRINE HCL 100 MCG: 10 MG/ML VIAL (ML) INJECTION at 17:26:00

## 2022-12-07 NOTE — DISCHARGE INSTRUCTIONS
Call for fevers, chills, erythema. May shower in 48 hours but no bathing. Leave steristrips in place. Wear abdominal binder and sports/surgical bra at all times. No heavy lifting or strenuous activity.

## 2022-12-07 NOTE — ANESTHESIA PROCEDURE NOTES
Airway  Date/Time: 12/7/2022 2:54 PM  Urgency: elective    Airway not difficult    General Information and Staff    Patient location during procedure: OR  Anesthesiologist: Lisa Hudson MD  Performed: anesthesiologist     Indications and Patient Condition  Indications for airway management: anesthesia  Spontaneous Ventilation: absent  Sedation level: deep  Preoxygenated: yes  Patient position: sniffing  Mask difficulty assessment: 2 - vent by mask + OA or adjuvant +/- NMBA    Final Airway Details  Final airway type: endotracheal airway      Successful airway: ETT  Cuffed: yes   Successful intubation technique: Video laryngoscopy  Endotracheal tube insertion site: oral  Blade: GlideScope  Blade size: #3  ETT size (mm): 7.0    Cormack-Lehane Classification: grade I - full view of glottis  Placement verified by: chest auscultation and capnometry   Measured from: lips  ETT to lips (cm): 22  Number of attempts at approach: 1  Ventilation between attempts: none  Number of other approaches attempted: 0    Additional Comments  PreO2. IV induction as noted. Eyes taped. Easy mask ventilation. Glidescope #3 used, vocal cords visualized, atraumatic oral intubation ETT 7.0, +ETCO2, +BBS. Taped and secured at 22 cm.

## 2022-12-07 NOTE — BRIEF OP NOTE
Pre-Operative Diagnosis: Absence of breast, acquired, bilateral [Z90.13]     Post-Operative Diagnosis: Absence of breast, acquired, bilateral [Z90.13]      Procedure Performed:   Removal of bilateral breast tissue expanders with placement of permanent implant and autologous fat grafting    Surgeon(s) and Role:     Kena Kong MD - Primary    Assistant(s):  Surgical Assistant.: Verlan Door     Surgical Findings: Nl     Specimen: Bilateral breast tissue     Estimated Blood Loss: 10ml      Warner Jordan MD  12/7/2022  5:50 PM

## 2022-12-07 NOTE — ANESTHESIA PROCEDURE NOTES
Regional Block    Date/Time: 12/7/2022 2:58 PM  Performed by: Lisa Hudson MD  Authorized by: Lisa Hudson MD       General Information and Staff    Start Time:  12/7/2022 2:58 PM  End Time:  12/7/2022 3:01 PM  Anesthesiologist:  Lisa Hudson MD  Performed by: Anesthesiologist  Patient Location:  OR    Block Placement: Post Induction  Site Identification: real time ultrasound guided and image stored and retrievable    Block site/laterality marked before start: site marked  Reason for Block: at surgeon's request and post-op pain management    Preanesthetic Checklist: 2 patient identifers, IV checked, risks and benefits discussed, monitors and equipment checked, pre-op evaluation, timeout performed, anesthesia consent, sterile technique used, no prohibitive neurological deficits and no local skin infection at insertion site      Procedure Details    Patient Position:  Supine  Prep: ChloraPrep    Monitoring:  Cardiac monitor, continuous pulse ox, blood pressure cuff and heart rate  Block Type:  PEC1  Laterality:  Right  Injection Technique:  Single-shot    Needle    Needle Type:  Short-bevel and echogenic  Needle Gauge:  21 G  Needle Length:  100 mm  Needle Localization:  Ultrasound guidance  Reason for Ultrasound Use: appropriate spread of the medication was noted in real time and no ultrasound evidence of intravascular and/or intraneural injection            Assessment    Injection Assessment:  Good spread noted, negative resistance, negative aspiration for heme, incremental injection and low pressure  Heart Rate Change: No    - Patient tolerated block procedure well without evidence of immediate block related complications.      Medications  12/7/2022 2:58 PM      Additional Comments    Right PEC I block performed under ultrasound guidance without complications using marcaine 0.25% with epi 1:200,000 10ml total.

## 2022-12-07 NOTE — ANESTHESIA POSTPROCEDURE EVALUATION
[facility] Patient Status:  Hospital Outpatient Surgery   Age/Gender 56year old female MRN [id number]   [facility] SURGERY Attending José Antonio Bowden MD   [facility] Day # 0 PCP Renata Hart MD       Anesthesia Post-op Note    Removal of bilateral breast tissue expanders with placement of permanent implant and autologous fat grafting    Procedure Summary     Date: 12/07/22 Room / Location: [address] [place] [address] OR 23 / [address] [facility] OR    Anesthesia Start: 2050 Anesthesia Stop: 8080    Procedure: Removal of bilateral breast tissue expanders with placement of permanent implant and autologous fat grafting (Bilateral: Breast) Diagnosis:       Absence of breast, acquired, bilateral      (Absence of breast, acquired, bilateral [Z90.13])    Surgeons: José Antonio Bowden MD Anesthesiologist: Mellisa Rocha MD    Anesthesia Type: general ASA Status: 3          Anesthesia Type: general    Vitals Value Taken Time   BP 98/59 12/07/22 1756   Temp 98.7 12/07/22 1756   Pulse 95 12/07/22 1756   Resp 18 12/07/22 1756   SpO2 93 12/07/22 1756       Patient Location: PACU    Anesthesia Type: general    Airway Patency: patent, extubated and oral/nasal airway    Postop Pain Control: adequate    Mental Status: mildly sedated but able to meaningfully participate in the post-anesthesia evaluation    Nausea/Vomiting: none    Cardiopulmonary/Hydration status: stable euvolemic    Complications: no apparent anesthesia related complications    Postop vital signs: stable    Dental Exam: Unchanged from Preop    Patient to be discharged from PACU when criteria met.

## 2022-12-07 NOTE — ANESTHESIA PROCEDURE NOTES
Regional Block    Date/Time: 12/7/2022 3:02 PM  Performed by: Laina Coronado MD  Authorized by: Laina Coronado MD       General Information and Staff    Start Time:  12/7/2022 3:02 PM  End Time:  12/7/2022 3:06 PM  Anesthesiologist:  Laina Coronado MD  Performed by: Anesthesiologist  Patient Location:  OR    Block Placement: Post Induction  Site Identification: real time ultrasound guided and image stored and retrievable    Block site/laterality marked before start: site marked  Reason for Block: at surgeon's request and post-op pain management    Preanesthetic Checklist: 2 patient identifers, IV checked, risks and benefits discussed, monitors and equipment checked, pre-op evaluation, timeout performed, anesthesia consent, sterile technique used, no prohibitive neurological deficits and no local skin infection at insertion site      Procedure Details    Patient Position:  Supine  Prep: ChloraPrep    Monitoring:  Cardiac monitor, continuous pulse ox and blood pressure cuff  Block Type:  PEC1 and PEC2  Laterality:  Left  Injection Technique:  Single-shot    Needle    Needle Type:  Short-bevel and echogenic  Needle Gauge:  21 G  Needle Length:  100 mm  Needle Localization:  Ultrasound guidance  Reason for Ultrasound Use: appropriate spread of the medication was noted in real time and no ultrasound evidence of intravascular and/or intraneural injection            Assessment    Injection Assessment:  Good spread noted, negative resistance, negative aspiration for heme, incremental injection and low pressure  Heart Rate Change: No    - Patient tolerated block procedure well without evidence of immediate block related complications. Medications  12/7/2022 3:02 PM      Additional Comments    Left PEC I block performed under ultrasound guidance without complications using 42IG marcaine 0.25% with epi 1:200,000.   Left PEC II block performed under ultrasound guidance without complications using 47FM marcaine 0.25% with epi 1:200,000.

## 2022-12-07 NOTE — H&P
No change in [facility]'s H&P from 11/16. We reviewed the plan for removal of bilateral breast tissue expanders, placement of smooth, round, silicone implants, and fat grafting to bilateral reconstructed breasts. The nature of the procedure was reviewed with the patient. We discussed the risks of surgery including but not limited to bleeding, infection, scarring, delayed wound healing, asymmetry, implant malposition, implant infection or extrusion requiring removal, ALCL, capsular contracture, hypertrophic scarring or keloid, injury to intra-abdominal structures, contour abnormalities, cysts or calcifications requiring biopsy, and need for further surgery. We reviewed the expected postoperative course including possible need for drains, as well as need for activity limitation and compression. Multiple questions were answered the patient's satisfaction. No guarantees as to outcome were offered. The patient expresses understanding and wishes to proceed.

## 2022-12-08 NOTE — OPERATIVE REPORT
[address] [facility]    PATIENT'S NAME: Mara Santos   ATTENDING PHYSICIAN: Tarun Avalos M.D. OPERATING PHYSICIAN: Tarun Avalos M.D. PATIENT ACCOUNT#:   [de-identified]    LOCATION:  [place] [address]  MEDICAL RECORD #:   [id number]       YOB: 1975  ADMISSION DATE:       12/07/2022      OPERATION DATE:  12/07/2022    OPERATIVE REPORT    PREOPERATIVE DIAGNOSIS:  History of bilateral mastectomy and tissue expander reconstruction. POSTOPERATIVE DIAGNOSIS:  History of bilateral mastectomy and tissue expander reconstruction. PROCEDURE:    1. Removal of bilateral breast tissue expanders. 2.   Bilateral breast capsulorrhaphy. 3.   Placement of silicone gel implants bilateral breasts. 4.   Autologous fat grafting of bilateral reconstructed breasts. ASSISTANT:  ADIA Yu     ANESTHESIA:  General.    ESTIMATED BLOOD LOSS:  10 mL. COMPLICATIONS:  None. INDICATIONS:  The patient is a 15-year-old female who previously underwent bilateral mastectomy and partial submuscular tissue expander reconstruction. She completed an uncomplicated expansion and now presents for exchange of permanent implants and fat grafting. FINDINGS:  Bilateral breasts reconstructed with Madelon Mediate SoftTouch breast implant, 385 mL, reference SSF-385, on the right, serial number [id number]; on the left, serial number [id number]. OPERATIVE TECHNIQUE:  Informed consent was obtained from the patient. The risks, benefits, and alternatives reviewed with the patient preoperatively. She expressed understanding and wished to proceed. The patient was marked in the preoperative holding area in the upright position. The midline and inframammary folds were marked bilaterally. Bilateral breast scars were encompassed in narrow transverse ellipses. Finally, areas of upper abdominal lipodystrophy were marked for liposuction.   The patient was taken to the operating room, properly identified, placed in a supine position. Sequential compression devices were placed on bilateral lower extremities. Intravenous antibiotic prophylaxis was administered. The patient then underwent successful induction of general anesthesia and endotracheal intubation. The arms were placed abducted on foam padded cradles, loosely secured with Kerlix. The chest was prepped and draped sterilely. The liposuction port site at the superior umbilical border, as well as bilateral breast scars, were infiltrated with 1% lidocaine with epinephrine. A stab incision was then created at the superior umbilical border and approximately 600 mL of tumescent solution were infiltrated into the upper abdomen. Next, attention was turned to the right breast.  The right breast scar was excised with a scalpel and sent for permanent pathologic analysis. A superior subcutaneous flap was then elevated sharply with a scalpel. A transverse capsulotomy was then created and tissue expander was retrieved and found to be intact. It was punctured, aspirated, and removed. The pocket was inspected. There was no periprosthetic fluid noted. There were no visible or palpable nodules noted. Complete incorporation of the acellular dermal matrix was noted. Given the patient desired to be smaller, an appropriate sized sizer was placed and a lateral capsulorrhaphy was marked externally and the marking transposed internally. A crescentic excision of the capsule was then performed and sent for permanent pathologic analysis. The capsular defect was repaired with interrupted running 2-0 Vicryl suture. Once adequate correction of the pocket had been achieved, the submuscular pocket was developed with electrocautery superiorly medially. Attention was then turned to the left breast.  In a similar fashion, the left breast scar was excised with a scalpel and sent for permanent pathologic analysis. A superior subcutaneous flap was then elevated sharply with a scalpel. A transverse capsulotomy was then created and the tissue expander was retrieved and found to be intact. It was punctured, aspirated, and removed. The pocket was inspected. There was no periprosthetic fluid noted. There were no visible or palpable nodules noted. Complete incorporation of the acellular dermal matrix was noted. In a similar fashion, the capsulorrhaphy markings were created and again a lateral crescentic excision of the capsule was performed with electrocautery. The capsule was sent for permanent pathologic analysis. The capsular defect was repaired with interrupted running 2-0 Vicryl suture. Next, the submuscular pocket was developed with electrocautery and sizers were placed. Attention was then turned to the abdomen. Using a 5 mm Mercedes tip cannula, power-assisted liposuction of the upper abdomen was performed. Approximately 100 mL of lipoaspirate were collected using the [employer] system. The liposuction port site was closed with an interrupted 3-0 Vicryl deep dermal sutures. The fat was prepared in the usual fashion. The patient was placed in the upright position with sizers in place and via the existing incisions, fat was grafted to bilateral breast; 25 mL were grafted to the right breast and 35 mL were grafted to the left breast.  Next, both pockets were irrigated with Betadine and antibiotic irrigation until clear. Hemostasis was checked and noted to be adequate. The surgical sites were isolated with Ioban and gloves were changed. The implants were brought on the field and immediately bathed in antibiotic irrigation. They were checked for integrity and noted to be intact. The implants were placed in the partial submuscular position, taking care to maintain their proper orientation. Capsular plication was incorporated in their closure with interrupted 2-0 Vicryl suture and then finally a running 2-0 Vicryl suture. Redundant skin was then tailor tacked and excised.   The wounds were repaired in layered fashion with 3-0 Vicryl deep dermal sutures and running 4-0 Monocryl subcuticular suture. Exofin and Steri-Strips were placed on the incisions. A fluff gauze and a surgical bra were placed on the breast.  TopiFoam and an abdominal binder were placed on the abdomen. The patient was awakened, extubated, taken to the recovery area in stable condition. There were no operative complications. All needle, sponge, and instrument counts were correct at the end of the procedure. Dictated By Yaniv Harris M.D.  d: 12/07/2022 17:59:37  t: 12/08/2022 02:58:20  Abel Frey [id number]  O/

## 2022-12-16 ENCOUNTER — OFFICE VISIT (OUTPATIENT)
Dept: SURGERY | Facility: CLINIC | Age: 47
End: 2022-12-16
Payer: COMMERCIAL

## 2022-12-16 DIAGNOSIS — Z90.13 ABSENCE OF BOTH BREASTS: Primary | ICD-10-CM

## 2022-12-16 PROCEDURE — 99024 POSTOP FOLLOW-UP VISIT: CPT | Performed by: PHYSICIAN ASSISTANT

## 2022-12-16 NOTE — PROGRESS NOTES
Edna Wright is a 56year old female who presents today for a follow-up after removal of bilateral breast tissue expanders, bilateral breast capsulorrhaphy, placement of silicone gel implants bilateral breasts, and autologous fat grafting to bilateral reconstruction breasts on 12/7/2022 with Dr. China Peña. She denies fever and chills. She denies nausea, vomiting, diarrhea or constipation. Her pain is controlled. Physical Exam     Breasts: bilateral breast incisions with steri-strips in place, clean, dry and intact. No wound drainage. Bilateral breast skin is without erythema, ecchymosis, necrosis. No evidence of hematoma/seroma. Good shape and symmetry. Abdomen: abdomen soft and nontender. No evidence of hematoma/seroma. Superior umbilical incision clean, dry and intact without wound drainage. No erythema. There were no vitals filed for this visit. Assessment and Plan     Edna Wright is doing well s/p removal of bilateral breast tissue expanders, bilateral breast capsulorrhaphy, placement of silicone gel implants bilateral breasts, and autologous fat grafting to bilateral reconstruction breasts on 12/7/2022 with Dr. China Peña. I recommend she continue with breast compression at all times as well as with activity restrictions. We reviewed reasons to contact our office. She will follow up in 2 weeks with Dr. China Peña. Questions were answered. Patient understands.      Roger Weinstein  12/16/2022  9:12 AM

## 2023-01-03 ENCOUNTER — OFFICE VISIT (OUTPATIENT)
Dept: SURGERY | Facility: CLINIC | Age: 48
End: 2023-01-03
Payer: COMMERCIAL

## 2023-01-03 DIAGNOSIS — Z90.13 ABSENCE OF BOTH BREASTS: Primary | ICD-10-CM

## 2023-01-03 PROCEDURE — 99024 POSTOP FOLLOW-UP VISIT: CPT | Performed by: SURGERY

## 2023-01-03 NOTE — PROGRESS NOTES
Karthik Thompson is a 56year old female who presents today for a follow-up after undergoing exchange of permanent implants on 12/8. Physical Examination:   Breasts: Bilateral breast incisions are clean dry and intact. Assessment and Plan: Patient is doing well. We discussed scar care including massage moisturizer and silicone products. The patient may slowly increase activity with compression in place. She will follow-up in 6 months for scar check. The plan was reviewed with the patient and questions were answered.

## 2023-01-07 DIAGNOSIS — E10.9 TYPE 1 DIABETES MELLITUS WITHOUT COMPLICATION (HCC): ICD-10-CM

## 2023-01-08 RX ORDER — INSULIN ASPART 100 [IU]/ML
INJECTION, SOLUTION INTRAVENOUS; SUBCUTANEOUS
Qty: 70 ML | Refills: 0 | Status: SHIPPED | OUTPATIENT
Start: 2023-01-08

## 2023-02-02 DIAGNOSIS — E10.9 TYPE 1 DIABETES MELLITUS WITHOUT COMPLICATIONS (HCC): ICD-10-CM

## 2023-02-02 RX ORDER — RAMIPRIL 2.5 MG/1
2.5 CAPSULE ORAL NIGHTLY
Qty: 90 CAPSULE | Refills: 0 | Status: SHIPPED | OUTPATIENT
Start: 2023-02-02

## 2023-02-02 RX ORDER — ROSUVASTATIN CALCIUM 20 MG/1
TABLET, COATED ORAL
Qty: 90 TABLET | Refills: 0 | Status: SHIPPED | OUTPATIENT
Start: 2023-02-02

## 2023-02-20 ENCOUNTER — TELEPHONE (OUTPATIENT)
Dept: INTERNAL MEDICINE CLINIC | Facility: CLINIC | Age: 48
End: 2023-02-20

## 2023-02-20 DIAGNOSIS — E10.65 TYPE 1 DIABETES MELLITUS WITH HYPERGLYCEMIA (HCC): Primary | ICD-10-CM

## 2023-02-20 RX ORDER — FLASH GLUCOSE SCANNING READER
EACH MISCELLANEOUS
Qty: 1 EACH | Refills: 0 | Status: SHIPPED | OUTPATIENT
Start: 2023-02-20

## 2023-02-20 RX ORDER — FLASH GLUCOSE SENSOR
1 KIT MISCELLANEOUS
Qty: 6 EACH | Refills: 0 | Status: SHIPPED | OUTPATIENT
Start: 2023-02-20

## 2023-02-20 RX ORDER — BLOOD-GLUCOSE SENSOR
1 EACH MISCELLANEOUS AS DIRECTED
Qty: 6 EACH | Refills: 0 | Status: SHIPPED | OUTPATIENT
Start: 2023-02-20 | End: 2023-02-20

## 2023-02-20 RX ORDER — BLOOD-GLUCOSE,RECEIVER,CONT
1 EACH MISCELLANEOUS AS DIRECTED
Qty: 1 EACH | Refills: 0 | Status: SHIPPED | OUTPATIENT
Start: 2023-02-20 | End: 2023-02-20

## 2023-02-21 ENCOUNTER — TELEPHONE (OUTPATIENT)
Dept: INTERNAL MEDICINE CLINIC | Facility: CLINIC | Age: 48
End: 2023-02-21

## 2023-02-21 DIAGNOSIS — E10.65 TYPE 1 DIABETES MELLITUS WITH HYPERGLYCEMIA (HCC): Primary | ICD-10-CM

## 2023-02-21 RX ORDER — BLOOD-GLUCOSE SENSOR
EACH MISCELLANEOUS
Refills: 0 | Status: CANCELLED | OUTPATIENT
Start: 2023-02-21

## 2023-02-21 RX ORDER — BLOOD-GLUCOSE SENSOR
1 EACH MISCELLANEOUS AS DIRECTED
Qty: 6 EACH | Refills: 0 | Status: SHIPPED | OUTPATIENT
Start: 2023-02-21

## 2023-02-21 NOTE — TELEPHONE ENCOUNTER
Original freestyle danis reader/sensors discontinued per fax from pharmacy    Pt requesting freestyle danis 3 supplies    [facility]/pharmacy #[id number] - [place], [address] [place] [address].  [phone], [phone]

## 2023-04-10 ENCOUNTER — OFFICE VISIT (OUTPATIENT)
Dept: INTERNAL MEDICINE CLINIC | Facility: CLINIC | Age: 48
End: 2023-04-10
Payer: COMMERCIAL

## 2023-04-10 ENCOUNTER — LAB ENCOUNTER (OUTPATIENT)
Dept: LAB | Age: 48
End: 2023-04-10
Attending: PHYSICIAN ASSISTANT
Payer: COMMERCIAL

## 2023-04-10 VITALS
RESPIRATION RATE: 16 BRPM | TEMPERATURE: 98 F | SYSTOLIC BLOOD PRESSURE: 122 MMHG | HEIGHT: 63 IN | DIASTOLIC BLOOD PRESSURE: 72 MMHG | BODY MASS INDEX: 26.75 KG/M2 | HEART RATE: 92 BPM | OXYGEN SATURATION: 98 % | WEIGHT: 151 LBS

## 2023-04-10 DIAGNOSIS — N95.1 PERIMENOPAUSE: ICD-10-CM

## 2023-04-10 DIAGNOSIS — C50.911 MALIGNANT NEOPLASM OF RIGHT FEMALE BREAST, UNSPECIFIED ESTROGEN RECEPTOR STATUS, UNSPECIFIED SITE OF BREAST (HCC): ICD-10-CM

## 2023-04-10 DIAGNOSIS — E03.8 SUBCLINICAL HYPOTHYROIDISM: ICD-10-CM

## 2023-04-10 DIAGNOSIS — E10.65 TYPE 1 DIABETES MELLITUS WITH HYPERGLYCEMIA (HCC): ICD-10-CM

## 2023-04-10 DIAGNOSIS — E10.69 HYPERLIPIDEMIA DUE TO TYPE 1 DIABETES MELLITUS (HCC): Primary | ICD-10-CM

## 2023-04-10 DIAGNOSIS — Z82.49 FAMILY HISTORY OF PREMATURE CAD: ICD-10-CM

## 2023-04-10 DIAGNOSIS — E10.29 MICROALBUMINURIA DUE TO TYPE 1 DIABETES MELLITUS (HCC): ICD-10-CM

## 2023-04-10 DIAGNOSIS — R80.9 MICROALBUMINURIA DUE TO TYPE 1 DIABETES MELLITUS (HCC): ICD-10-CM

## 2023-04-10 DIAGNOSIS — E10.69 HYPERLIPIDEMIA DUE TO TYPE 1 DIABETES MELLITUS (HCC): ICD-10-CM

## 2023-04-10 DIAGNOSIS — E78.5 HYPERLIPIDEMIA DUE TO TYPE 1 DIABETES MELLITUS (HCC): ICD-10-CM

## 2023-04-10 DIAGNOSIS — E78.5 HYPERLIPIDEMIA DUE TO TYPE 1 DIABETES MELLITUS (HCC): Primary | ICD-10-CM

## 2023-04-10 LAB
ALBUMIN SERPL-MCNC: 3.7 G/DL (ref 3.4–5)
ALBUMIN/GLOB SERPL: 1 {RATIO} (ref 1–2)
ALP LIVER SERPL-CCNC: 82 U/L
ALT SERPL-CCNC: 46 U/L
ANION GAP SERPL CALC-SCNC: 5 MMOL/L (ref 0–18)
AST SERPL-CCNC: 37 U/L (ref 15–37)
BASOPHILS # BLD AUTO: 0.09 X10(3) UL (ref 0–0.2)
BASOPHILS NFR BLD AUTO: 1.4 %
BILIRUB SERPL-MCNC: 0.4 MG/DL (ref 0.1–2)
BUN BLD-MCNC: 18 MG/DL (ref 7–18)
CALCIUM BLD-MCNC: 9.4 MG/DL (ref 8.5–10.1)
CHLORIDE SERPL-SCNC: 109 MMOL/L (ref 98–112)
CHOLEST SERPL-MCNC: 201 MG/DL (ref ?–200)
CO2 SERPL-SCNC: 25 MMOL/L (ref 21–32)
CREAT BLD-MCNC: 0.95 MG/DL
EOSINOPHIL # BLD AUTO: 0.3 X10(3) UL (ref 0–0.7)
EOSINOPHIL NFR BLD AUTO: 4.5 %
ERYTHROCYTE [DISTWIDTH] IN BLOOD BY AUTOMATED COUNT: 13.4 %
ESTRADIOL SERPL-MCNC: 24 PG/ML
FASTING PATIENT LIPID ANSWER: YES
FASTING STATUS PATIENT QL REPORTED: YES
FSH SERPL-ACNC: 111.7 MIU/ML
GFR SERPLBLD BASED ON 1.73 SQ M-ARVRAT: 75 ML/MIN/1.73M2 (ref 60–?)
GLOBULIN PLAS-MCNC: 3.6 G/DL (ref 2.8–4.4)
GLUCOSE BLD-MCNC: 151 MG/DL (ref 70–99)
HCT VFR BLD AUTO: 37.7 %
HDLC SERPL-MCNC: 89 MG/DL (ref 40–59)
HGB BLD-MCNC: 12.2 G/DL
IMM GRANULOCYTES # BLD AUTO: 0 X10(3) UL (ref 0–1)
IMM GRANULOCYTES NFR BLD: 0 %
LDLC SERPL CALC-MCNC: 103 MG/DL (ref ?–100)
LYMPHOCYTES # BLD AUTO: 1.76 X10(3) UL (ref 1–4)
LYMPHOCYTES NFR BLD AUTO: 26.7 %
MCH RBC QN AUTO: 30.7 PG (ref 26–34)
MCHC RBC AUTO-ENTMCNC: 32.4 G/DL (ref 31–37)
MCV RBC AUTO: 94.7 FL
MONOCYTES # BLD AUTO: 0.47 X10(3) UL (ref 0.1–1)
MONOCYTES NFR BLD AUTO: 7.1 %
NEUTROPHILS # BLD AUTO: 3.98 X10 (3) UL (ref 1.5–7.7)
NEUTROPHILS # BLD AUTO: 3.98 X10(3) UL (ref 1.5–7.7)
NEUTROPHILS NFR BLD AUTO: 60.3 %
NONHDLC SERPL-MCNC: 112 MG/DL (ref ?–130)
OSMOLALITY SERPL CALC.SUM OF ELEC: 293 MOSM/KG (ref 275–295)
PLATELET # BLD AUTO: 346 10(3)UL (ref 150–450)
POTASSIUM SERPL-SCNC: 4.7 MMOL/L (ref 3.5–5.1)
PROT SERPL-MCNC: 7.3 G/DL (ref 6.4–8.2)
RBC # BLD AUTO: 3.98 X10(6)UL
SODIUM SERPL-SCNC: 139 MMOL/L (ref 136–145)
T4 FREE SERPL-MCNC: 0.9 NG/DL (ref 0.8–1.7)
TRIGL SERPL-MCNC: 50 MG/DL (ref 30–149)
TSI SER-ACNC: 5.16 MIU/ML (ref 0.36–3.74)
VLDLC SERPL CALC-MCNC: 8 MG/DL (ref 0–30)
WBC # BLD AUTO: 6.6 X10(3) UL (ref 4–11)

## 2023-04-10 PROCEDURE — 84443 ASSAY THYROID STIM HORMONE: CPT

## 2023-04-10 PROCEDURE — 82670 ASSAY OF TOTAL ESTRADIOL: CPT

## 2023-04-10 PROCEDURE — 80061 LIPID PANEL: CPT

## 2023-04-10 PROCEDURE — 83001 ASSAY OF GONADOTROPIN (FSH): CPT

## 2023-04-10 PROCEDURE — 3008F BODY MASS INDEX DOCD: CPT | Performed by: PHYSICIAN ASSISTANT

## 2023-04-10 PROCEDURE — 36415 COLL VENOUS BLD VENIPUNCTURE: CPT

## 2023-04-10 PROCEDURE — 84439 ASSAY OF FREE THYROXINE: CPT

## 2023-04-10 PROCEDURE — 80053 COMPREHEN METABOLIC PANEL: CPT

## 2023-04-10 PROCEDURE — 85025 COMPLETE CBC W/AUTO DIFF WBC: CPT

## 2023-04-10 PROCEDURE — 3074F SYST BP LT 130 MM HG: CPT | Performed by: PHYSICIAN ASSISTANT

## 2023-04-10 PROCEDURE — 99214 OFFICE O/P EST MOD 30 MIN: CPT | Performed by: PHYSICIAN ASSISTANT

## 2023-04-10 PROCEDURE — 3078F DIAST BP <80 MM HG: CPT | Performed by: PHYSICIAN ASSISTANT

## 2023-04-25 ENCOUNTER — OFFICE VISIT (OUTPATIENT)
Dept: SURGERY | Facility: CLINIC | Age: 48
End: 2023-04-25
Payer: COMMERCIAL

## 2023-04-25 VITALS
OXYGEN SATURATION: 98 % | RESPIRATION RATE: 16 BRPM | HEART RATE: 92 BPM | SYSTOLIC BLOOD PRESSURE: 115 MMHG | DIASTOLIC BLOOD PRESSURE: 66 MMHG | WEIGHT: 151.81 LBS | HEIGHT: 63 IN | BODY MASS INDEX: 26.9 KG/M2

## 2023-04-25 DIAGNOSIS — Z90.13 ABSENCE OF BOTH BREASTS: ICD-10-CM

## 2023-04-25 DIAGNOSIS — C50.919: Primary | ICD-10-CM

## 2023-04-25 PROCEDURE — 3074F SYST BP LT 130 MM HG: CPT | Performed by: SURGERY

## 2023-04-25 PROCEDURE — 99213 OFFICE O/P EST LOW 20 MIN: CPT | Performed by: SURGERY

## 2023-04-25 PROCEDURE — 3078F DIAST BP <80 MM HG: CPT | Performed by: SURGERY

## 2023-04-25 PROCEDURE — 3008F BODY MASS INDEX DOCD: CPT | Performed by: SURGERY

## 2023-04-27 PROBLEM — C50.919: Status: ACTIVE | Noted: 2023-04-27

## 2023-04-30 DIAGNOSIS — E10.9 TYPE 1 DIABETES MELLITUS WITHOUT COMPLICATIONS (HCC): ICD-10-CM

## 2023-04-30 RX ORDER — ROSUVASTATIN CALCIUM 20 MG/1
TABLET, COATED ORAL
Qty: 90 TABLET | Refills: 0 | Status: SHIPPED | OUTPATIENT
Start: 2023-04-30

## 2023-05-01 DIAGNOSIS — E10.9 TYPE 1 DIABETES MELLITUS WITHOUT COMPLICATION (HCC): ICD-10-CM

## 2023-05-01 RX ORDER — BLOOD SUGAR DIAGNOSTIC
STRIP MISCELLANEOUS
Qty: 100 STRIP | Refills: 3 | Status: SHIPPED | OUTPATIENT
Start: 2023-05-01

## 2023-05-02 ENCOUNTER — PATIENT MESSAGE (OUTPATIENT)
Dept: INTERNAL MEDICINE CLINIC | Facility: CLINIC | Age: 48
End: 2023-05-02

## 2023-05-04 ENCOUNTER — TELEMEDICINE (OUTPATIENT)
Dept: INTERNAL MEDICINE CLINIC | Facility: CLINIC | Age: 48
End: 2023-05-04
Payer: COMMERCIAL

## 2023-05-04 DIAGNOSIS — M54.32 LEFT SIDED SCIATICA: Primary | ICD-10-CM

## 2023-05-04 DIAGNOSIS — E10.65 TYPE 1 DIABETES MELLITUS WITH HYPERGLYCEMIA (HCC): ICD-10-CM

## 2023-05-04 PROCEDURE — 99214 OFFICE O/P EST MOD 30 MIN: CPT | Performed by: NURSE PRACTITIONER

## 2023-05-04 RX ORDER — CYCLOBENZAPRINE HCL 10 MG
10 TABLET ORAL NIGHTLY
Qty: 10 TABLET | Refills: 0 | Status: SHIPPED | OUTPATIENT
Start: 2023-05-04

## 2023-05-04 RX ORDER — METHYLPREDNISOLONE 4 MG/1
TABLET ORAL
Qty: 1 EACH | Refills: 0 | Status: SHIPPED | OUTPATIENT
Start: 2023-05-04

## 2023-05-09 ENCOUNTER — HOSPITAL ENCOUNTER (OUTPATIENT)
Dept: CT IMAGING | Age: 48
Discharge: HOME | End: 2023-05-09
Attending: INTERNAL MEDICINE

## 2023-05-09 DIAGNOSIS — Z13.6 SCREENING FOR HEART DISEASE: ICD-10-CM

## 2023-05-17 ENCOUNTER — TELEPHONE (OUTPATIENT)
Dept: INTERNAL MEDICINE CLINIC | Facility: CLINIC | Age: 48
End: 2023-05-17

## 2023-05-17 DIAGNOSIS — E10.69 HYPERLIPIDEMIA DUE TO TYPE 1 DIABETES MELLITUS (HCC): Primary | ICD-10-CM

## 2023-05-17 DIAGNOSIS — E10.65 TYPE 1 DIABETES MELLITUS WITH HYPERGLYCEMIA (HCC): ICD-10-CM

## 2023-05-17 DIAGNOSIS — Z82.49 FAMILY HISTORY OF PREMATURE CAD: ICD-10-CM

## 2023-05-17 DIAGNOSIS — E78.5 HYPERLIPIDEMIA DUE TO TYPE 1 DIABETES MELLITUS (HCC): Primary | ICD-10-CM

## 2023-05-18 NOTE — TELEPHONE ENCOUNTER
Form completed and signed by Dr. Moreno Cardozo. Faxed. Called patient and advised her this has been completed as well. Pt verbalized understanding.

## 2023-06-09 ENCOUNTER — OFFICE VISIT (OUTPATIENT)
Dept: SURGERY | Facility: CLINIC | Age: 48
End: 2023-06-09
Payer: COMMERCIAL

## 2023-06-09 DIAGNOSIS — Z90.13 ABSENCE OF BOTH BREASTS: ICD-10-CM

## 2023-06-09 DIAGNOSIS — C50.919: Primary | ICD-10-CM

## 2023-06-09 PROCEDURE — 99212 OFFICE O/P EST SF 10 MIN: CPT | Performed by: SURGERY

## 2023-06-09 NOTE — PROGRESS NOTES
Loulou Strickland is a 56year old female who presents today for a follow-up. She is without new complaints. Physical Examination:  Breasts: Bilateral breast incisions are clean dry and intact. Acceptable shape and symmetry is noted. There is no erythema or seroma noted. There are no palpable masses noted bilaterally. There is no axillary lymphadenopathy noted. Assessment and Plan:  Patient is doing well. We reviewed the recommended FDA surveillance protocol for silicone implants. We discussed continuing regular self breast examination with a plan for follow-up in 1 year or sooner if any changes are detected. The plan was reviewed with the patient and questions were answered.

## 2023-06-19 ENCOUNTER — TELEPHONE (OUTPATIENT)
Dept: INTERNAL MEDICINE CLINIC | Facility: CLINIC | Age: 48
End: 2023-06-19

## 2023-06-19 DIAGNOSIS — E10.9 TYPE 1 DIABETES MELLITUS WITHOUT COMPLICATION (HCC): ICD-10-CM

## 2023-06-19 RX ORDER — BLOOD SUGAR DIAGNOSTIC
1 STRIP MISCELLANEOUS 4 TIMES DAILY
Qty: 400 STRIP | Refills: 3 | Status: SHIPPED | OUTPATIENT
Start: 2023-06-19

## 2023-06-19 NOTE — TELEPHONE ENCOUNTER
Per pharmacy fax needs 90 day supply of Accu-Chek Guide Test Strips    Does not have enough to make it 90 days

## 2023-09-06 ENCOUNTER — LAB ENCOUNTER (OUTPATIENT)
Dept: LAB | Age: 48
End: 2023-09-06
Attending: INTERNAL MEDICINE
Payer: COMMERCIAL

## 2023-09-06 ENCOUNTER — LAB ENCOUNTER (OUTPATIENT)
Dept: LAB | Age: 48
End: 2023-09-06
Attending: PHYSICIAN ASSISTANT
Payer: COMMERCIAL

## 2023-09-06 DIAGNOSIS — E10.65 TYPE I DIABETES MELLITUS WITH HYPEROSMOLAR COMA: ICD-10-CM

## 2023-09-06 DIAGNOSIS — Z82.49 FAMILY HISTORY OF ISCHEMIC HEART DISEASE: ICD-10-CM

## 2023-09-06 DIAGNOSIS — E87.0 TYPE I DIABETES MELLITUS WITH HYPEROSMOLAR COMA: ICD-10-CM

## 2023-09-06 DIAGNOSIS — Z82.49 FAMILY HISTORY OF ISCHEMIC HEART DISEASE: Primary | ICD-10-CM

## 2023-09-06 DIAGNOSIS — E10.69 HYPERLIPIDEMIA DUE TO TYPE 1 DIABETES MELLITUS: ICD-10-CM

## 2023-09-06 DIAGNOSIS — E10.29 MICROALBUMINURIA DUE TO TYPE 1 DIABETES MELLITUS: ICD-10-CM

## 2023-09-06 DIAGNOSIS — E78.5 HYPERLIPIDEMIA DUE TO TYPE 1 DIABETES MELLITUS: ICD-10-CM

## 2023-09-06 DIAGNOSIS — R80.9 MICROALBUMINURIA DUE TO TYPE 1 DIABETES MELLITUS: ICD-10-CM

## 2023-09-06 DIAGNOSIS — E10.65 TYPE 1 DIABETES MELLITUS WITH HYPERGLYCEMIA (HCC): ICD-10-CM

## 2023-09-06 DIAGNOSIS — E10.69 TYPE I DIABETES MELLITUS WITH HYPEROSMOLAR COMA: ICD-10-CM

## 2023-09-06 LAB
ALBUMIN SERPL-MCNC: 3.6 G/DL (ref 3.4–5)
ALBUMIN/GLOB SERPL: 1.1 {RATIO} (ref 1–2)
ALP LIVER SERPL-CCNC: 97 U/L
ALT SERPL-CCNC: 87 U/L
ANION GAP SERPL CALC-SCNC: 6 MMOL/L (ref 0–18)
AST SERPL-CCNC: 50 U/L (ref 15–37)
BILIRUB SERPL-MCNC: 0.4 MG/DL (ref 0.1–2)
BILIRUB UR QL STRIP.AUTO: NEGATIVE
BUN BLD-MCNC: 18 MG/DL (ref 7–18)
CALCIUM BLD-MCNC: 9.2 MG/DL (ref 8.5–10.1)
CHLORIDE SERPL-SCNC: 109 MMOL/L (ref 98–112)
CHOLEST SERPL-MCNC: 162 MG/DL (ref ?–200)
CLARITY UR REFRACT.AUTO: CLEAR
CO2 SERPL-SCNC: 25 MMOL/L (ref 21–32)
CREAT BLD-MCNC: 1.04 MG/DL
CREAT UR-SCNC: 128 MG/DL
EGFRCR SERPLBLD CKD-EPI 2021: 67 ML/MIN/1.73M2 (ref 60–?)
EST. AVERAGE GLUCOSE BLD GHB EST-MCNC: 183 MG/DL (ref 68–126)
FASTING PATIENT LIPID ANSWER: YES
FASTING STATUS PATIENT QL REPORTED: YES
GLOBULIN PLAS-MCNC: 3.4 G/DL (ref 2.8–4.4)
GLUCOSE BLD-MCNC: 158 MG/DL (ref 70–99)
GLUCOSE UR STRIP.AUTO-MCNC: NORMAL MG/DL
HBA1C MFR BLD: 8 % (ref ?–5.7)
HDLC SERPL-MCNC: 74 MG/DL (ref 40–59)
KETONES UR STRIP.AUTO-MCNC: NEGATIVE MG/DL
LDLC SERPL CALC-MCNC: 74 MG/DL (ref ?–100)
LEUKOCYTE ESTERASE UR QL STRIP.AUTO: 75
MICROALBUMIN UR-MCNC: 2.63 MG/DL
MICROALBUMIN/CREAT 24H UR-RTO: 20.5 UG/MG (ref ?–30)
NITRITE UR QL STRIP.AUTO: NEGATIVE
NONHDLC SERPL-MCNC: 88 MG/DL (ref ?–130)
OSMOLALITY SERPL CALC.SUM OF ELEC: 295 MOSM/KG (ref 275–295)
PH UR STRIP.AUTO: 5 [PH] (ref 5–8)
POTASSIUM SERPL-SCNC: 4.6 MMOL/L (ref 3.5–5.1)
PROT SERPL-MCNC: 7 G/DL (ref 6.4–8.2)
PROT UR STRIP.AUTO-MCNC: NEGATIVE MG/DL
RBC UR QL AUTO: NEGATIVE
SODIUM SERPL-SCNC: 140 MMOL/L (ref 136–145)
SP GR UR STRIP.AUTO: 1.01 (ref 1–1.03)
TRIGL SERPL-MCNC: 73 MG/DL (ref 30–149)
UROBILINOGEN UR STRIP.AUTO-MCNC: NORMAL MG/DL
VLDLC SERPL CALC-MCNC: 11 MG/DL (ref 0–30)

## 2023-09-06 PROCEDURE — 3061F NEG MICROALBUMINURIA REV: CPT | Performed by: NURSE PRACTITIONER

## 2023-09-06 PROCEDURE — 87086 URINE CULTURE/COLONY COUNT: CPT

## 2023-09-06 PROCEDURE — 82043 UR ALBUMIN QUANTITATIVE: CPT

## 2023-09-06 PROCEDURE — 36415 COLL VENOUS BLD VENIPUNCTURE: CPT

## 2023-09-06 PROCEDURE — 82570 ASSAY OF URINE CREATININE: CPT

## 2023-09-06 PROCEDURE — 83036 HEMOGLOBIN GLYCOSYLATED A1C: CPT

## 2023-09-06 PROCEDURE — 3052F HG A1C>EQUAL 8.0%<EQUAL 9.0%: CPT | Performed by: NURSE PRACTITIONER

## 2023-09-06 PROCEDURE — 80061 LIPID PANEL: CPT

## 2023-09-06 PROCEDURE — 81001 URINALYSIS AUTO W/SCOPE: CPT

## 2023-09-06 PROCEDURE — 80053 COMPREHEN METABOLIC PANEL: CPT

## 2023-09-15 DIAGNOSIS — E10.29 MICROALBUMINURIA DUE TO TYPE 1 DIABETES MELLITUS: ICD-10-CM

## 2023-09-15 DIAGNOSIS — R80.9 MICROALBUMINURIA DUE TO TYPE 1 DIABETES MELLITUS: ICD-10-CM

## 2023-09-15 RX ORDER — RAMIPRIL 2.5 MG/1
2.5 CAPSULE ORAL NIGHTLY
Qty: 90 CAPSULE | Refills: 0 | Status: SHIPPED | OUTPATIENT
Start: 2023-09-15

## 2023-10-02 ENCOUNTER — OFFICE VISIT (OUTPATIENT)
Dept: INTERNAL MEDICINE CLINIC | Facility: CLINIC | Age: 48
End: 2023-10-02
Payer: COMMERCIAL

## 2023-10-02 VITALS
BODY MASS INDEX: 28.7 KG/M2 | HEART RATE: 78 BPM | DIASTOLIC BLOOD PRESSURE: 70 MMHG | OXYGEN SATURATION: 99 % | SYSTOLIC BLOOD PRESSURE: 110 MMHG | HEIGHT: 63 IN | RESPIRATION RATE: 16 BRPM | WEIGHT: 162 LBS | TEMPERATURE: 99 F

## 2023-10-02 DIAGNOSIS — E10.69 HYPERLIPIDEMIA DUE TO TYPE 1 DIABETES MELLITUS: ICD-10-CM

## 2023-10-02 DIAGNOSIS — F51.04 PSYCHOPHYSIOLOGICAL INSOMNIA: ICD-10-CM

## 2023-10-02 DIAGNOSIS — Z00.00 ROUTINE PHYSICAL EXAMINATION: Primary | ICD-10-CM

## 2023-10-02 DIAGNOSIS — E03.8 SUBCLINICAL HYPOTHYROIDISM: ICD-10-CM

## 2023-10-02 DIAGNOSIS — R93.1 AGATSTON CORONARY ARTERY CALCIUM SCORE GREATER THAN 400: ICD-10-CM

## 2023-10-02 DIAGNOSIS — E78.5 HYPERLIPIDEMIA DUE TO TYPE 1 DIABETES MELLITUS: ICD-10-CM

## 2023-10-02 DIAGNOSIS — E10.65 TYPE 1 DIABETES MELLITUS WITH HYPERGLYCEMIA (HCC): ICD-10-CM

## 2023-10-02 PROCEDURE — 3078F DIAST BP <80 MM HG: CPT | Performed by: PHYSICIAN ASSISTANT

## 2023-10-02 PROCEDURE — 3008F BODY MASS INDEX DOCD: CPT | Performed by: PHYSICIAN ASSISTANT

## 2023-10-02 PROCEDURE — 3074F SYST BP LT 130 MM HG: CPT | Performed by: PHYSICIAN ASSISTANT

## 2023-10-02 PROCEDURE — 99396 PREV VISIT EST AGE 40-64: CPT | Performed by: PHYSICIAN ASSISTANT

## 2023-10-02 RX ORDER — ROSUVASTATIN CALCIUM 40 MG/1
40 TABLET, COATED ORAL EVERY EVENING
COMMUNITY
Start: 2023-09-09

## 2023-10-02 RX ORDER — TRAZODONE HYDROCHLORIDE 50 MG/1
50 TABLET ORAL NIGHTLY PRN
Qty: 30 TABLET | Refills: 0 | Status: SHIPPED | OUTPATIENT
Start: 2023-10-02

## 2023-10-02 RX ORDER — EZETIMIBE 10 MG
TABLET ORAL
COMMUNITY
Start: 2023-06-21

## 2023-10-31 DIAGNOSIS — F51.04 PSYCHOPHYSIOLOGICAL INSOMNIA: ICD-10-CM

## 2023-10-31 RX ORDER — TRAZODONE HYDROCHLORIDE 50 MG/1
50 TABLET ORAL NIGHTLY PRN
Qty: 30 TABLET | Refills: 0 | Status: SHIPPED | OUTPATIENT
Start: 2023-10-31

## 2023-10-31 NOTE — TELEPHONE ENCOUNTER
Last time medication was refilled 10/2/23  Quantity and # of refills 30/0  Last OV 10/2/23  Next OV 4/2/23

## 2023-11-08 ENCOUNTER — TELEPHONE (OUTPATIENT)
Dept: INTERNAL MEDICINE CLINIC | Facility: CLINIC | Age: 48
End: 2023-11-08

## 2023-11-08 ENCOUNTER — OFFICE VISIT (OUTPATIENT)
Dept: FAMILY MEDICINE CLINIC | Facility: CLINIC | Age: 48
End: 2023-11-08
Payer: COMMERCIAL

## 2023-11-08 VITALS
TEMPERATURE: 98 F | DIASTOLIC BLOOD PRESSURE: 58 MMHG | HEIGHT: 63 IN | HEART RATE: 87 BPM | RESPIRATION RATE: 16 BRPM | OXYGEN SATURATION: 99 % | SYSTOLIC BLOOD PRESSURE: 104 MMHG | BODY MASS INDEX: 28.75 KG/M2 | WEIGHT: 162.25 LBS

## 2023-11-08 DIAGNOSIS — H02.849 EDEMA OF EYELID, UNSPECIFIED LATERALITY: Primary | ICD-10-CM

## 2023-11-08 PROCEDURE — 3074F SYST BP LT 130 MM HG: CPT | Performed by: NURSE PRACTITIONER

## 2023-11-08 PROCEDURE — 3008F BODY MASS INDEX DOCD: CPT | Performed by: NURSE PRACTITIONER

## 2023-11-08 PROCEDURE — 3078F DIAST BP <80 MM HG: CPT | Performed by: NURSE PRACTITIONER

## 2023-11-08 PROCEDURE — 99213 OFFICE O/P EST LOW 20 MIN: CPT | Performed by: NURSE PRACTITIONER

## 2023-11-08 RX ORDER — PREDNISONE 20 MG/1
40 TABLET ORAL DAILY
Qty: 10 TABLET | Refills: 0 | Status: SHIPPED | OUTPATIENT
Start: 2023-11-08 | End: 2023-11-13

## 2023-11-08 NOTE — PATIENT INSTRUCTIONS
Supportive care. Tylenol and Ibuprofen for pain. May use cool compression for symptoms. Take medications a prescribed, monitor blood sugar for the possibility of hyperglycemia. Adjust insulin as needed. Start taking a over the counter antiacid medication. If your swelling get worse, breathing is affected, trouble swallowing, lip/tongue swelling, seek emergent medical evaluation at the ER. Follow up with your PCP.

## 2023-11-08 NOTE — TELEPHONE ENCOUNTER
Patient seen in [facility] setting today and prescribed Pred burst x 5 days. Dx with conjunctivitis. Will keep scheduled 11/14 appt at this time.

## 2023-11-08 NOTE — PROGRESS NOTES
CHIEF COMPLAINT:   No chief complaint on file. HPI:   Geovani Lazaro is a 53year old female who presents with c/o bilateral eyelid swelling, redness and burning. Patient reports that on Saturday she noticed her eyes felt strained an swollen. By yesterday she was experiencing a raw burning sensation along with continuous tearing and clear crusting. Denies changes in medications, vision changes, foreign body sensation, nasal congestion, yellow discharge, itching, new facial soaps/lotions, tongue/lip swelling, trouble swallowing, drooling, sob or trouble breathing. Patient has tried warm compression with increased burning sensation. Denies any OTC medications. Patient has been taking Ramipril for many years. Current Outpatient Medications   Medication Sig Dispense Refill    traZODone 50 MG Oral Tab Take 1 tablet (50 mg total) by mouth nightly as needed for Sleep. 30 tablet 0    ZETIA 10 MG Oral Tab Zetia 10 mg tablet, [RxNorm: 141500]      rosuvastatin 40 MG Oral Tab Take 1 tablet (40 mg total) by mouth every evening. ramipril 2.5 MG Oral Cap TAKE 1 CAPSULE (2.5 MG TOTAL) BY MOUTH AT BEDTIME TO PROTECT KIDNEYS. 90 capsule 0    Glucose Blood (ACCU-CHEK GUIDE) In Vitro Strip 1 Lancet by Finger stick route 4 (four) times daily. 400 strip 3    Clindamycin Phos-Benzoyl Perox 1-5 % External Gel Apply to face twice daily 1 each 1    cyclobenzaprine 10 MG Oral Tab Take 1 tablet (10 mg total) by mouth nightly. 10 tablet 0    Continuous Blood Gluc Sensor (FREESTYLE BRITT 3 SENSOR) Does not apply Misc 1 each As Directed. Change sensor every 10 days. Remove old sensor before placing new sensor. 6 each 0    NOVOLOG 100 UNIT/ML Injection Solution INJECT 70 UNITS SUBCUTANEOUSLY ONCE DAILY 70 mL 0    Ascorbic Acid (VITAMIN C) 100 MG Oral Tab Take 1 tablet (100 mg total) by mouth daily.       Continuous Blood Gluc Transmit (GUARDIAN LINK 3 TRANSMITTER) Does not apply Misc       Insulin Infusion Pump (MINIMED 770G INSULIN PUMP SYS) Does not apply Kit Basal rate:  12-3:30 AM: 0.7 units  3:30-7:30 PM: 0.9 units  7:30 PM-12:00 AM: 0.7 units    ICR: 10:1  CF/Sensitivity: 1:40  Target Glucose:       Cholecalciferol (VITAMIN D) 50 MCG (2000 UT) Oral Cap Take 50 capsules (100,000 Units total) by mouth daily. Vitamin E 180 MG Oral Cap Take 1 capsule (180 mg total) by mouth daily. Past Medical History:   Diagnosis Date    Actinic keratosis     Arthritis     Benign neoplasm of transverse colon 11/11/2021    Breast cancer ([facility] [address]     Calculus of kidney     Diabetes mellitus (HCC)     High cholesterol     IDDM (insulin dependent diabetes mellitus)     diagnosed 1999 - insulin pump    Other and unspecified hyperlipidemia     Type 1 diabetes mellitus (HCC)     Visual impairment     reading glasses      Past Surgical History:   Procedure Laterality Date    BREAST RECONSTRUCTION Bilateral 07/13/2022    Bilateral immediate breast reconstruction with tissue expander and acellular dermal matrix. Meme Berkowitz)    COLONOSCOPY  11/2021    COLONOSCOPY  11/11/21    Repeat in a year. LUMPECTOMY RIGHT  06/06/2022    Right breast wire localized lumpectomy with right breast specimen radiography.  Manjulayuko Reyes)    MASTECTOMY MODIFIED RADICAL Bilateral 07/13/2022    Bilateral breast nipple versus skin sparing mastectomies, right lymphoscintigraphy, right sentinel lymph node biopsy (JUMA)     OTHER SURGICAL HISTORY      [place] teeth      Family History   Problem Relation Age of Onset    Diabetes Mother         Diabetes, blood pressure, thyroid    Colon Polyps Mother     Heart Disorder Father 64        MI x 2    Lipids Father     Colon Polyps Father     Other (Other) Father         Stroke    Stroke Father     Diabetes Brother         Stroke, Thyroid, Shingles    Stroke Brother 55    Diabetes Brother         Heart attack    Dementia Maternal Grandmother     Heart Attack Maternal Grandfather     Other (Other) Paternal Grandmother 71        brain tumor/cancer    Heart Attack Paternal Grandfather     Other (Other) Paternal Uncle         possible stomach cancer dx 76s    Cancer Maternal Great-Grandmother         dt gynecological cancer, possible ovarian cancer, dx >50y      Social History     Socioeconomic History    Marital status: Single   Occupational History    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 See HPI  HENT: denies ear pain, congestion, sore throat  LUNGS: denies shortness of breath or cough  CARDIOVASCULAR: denies chest pain or palpitations   GI: denies N/V/C or abdominal pain  NEURO: denies headaches     EXAM:   LMP 07/31/2023 (Within Days)   GENERAL: well developed, well nourished,in no apparent distress  SKIN: no rashes,no suspicious lesions  EYES: PERRLA, EOMI, Absent conjunctiva erythematous, injected, or discharge. Bilateral upper and lower eyelid erythema and edema. Absent oily white plugs. No inflammation noted to lash lines, no scales. HENT: atraumatic, normocephalic,ears and throat are clear  NECK: supple, non tender  LUNGS: clear to auscultation bilaterally. CARDIO: RRR without murmur  LYMPH: No preauricular lymphadenopathy. No cervical lymphadenopathy    ASSESSMENT AND PLAN:   Juwan Salazar is a 53year old female who presents with:    ASSESSMENT:   Encounter Diagnosis   Name Primary?     Edema of eyelid, unspecified laterality Yes       PLAN: Hygeine and comfort care as listen in patient instructions. Medication as listed below     Requested Prescriptions     Signed Prescriptions Disp Refills    predniSONE 20 MG Oral Tab 10 tablet 0     Sig: Take 2 tablets (40 mg total) by mouth daily for 5 days. Risks, benefits, complications and side effects of meds discussed. Advised patient to avoid touching eyes. Stressed importance of good handwashing as conjunctivitis is very contagious. Warm compresses to affected eye prn. Can return to work/school after on medication for 24 hours. Patient Instructions   Supportive care. Tylenol and Ibuprofen for pain. May use cool compression for symptoms. Take medications a prescribed, monitor blood sugar for the possibility of hyperglycemia. Adjust insulin as needed. Start taking a over the counter antiacid medication. If your swelling get worse, breathing is affected, trouble swallowing, lip/tongue swelling, seek emergent medical evaluation at the ER. Follow up with your PCP. Call or return if not improved in 2-3 days. The patient is asked to follow up with their PCP prn.

## 2023-11-08 NOTE — TELEPHONE ENCOUNTER
Myc appt for 11/14/23    On 5 days of prednisone for swollen eyes & red irritation circling a 5 inch perimeter around eyes.      Please advise

## 2023-11-10 ENCOUNTER — OFFICE VISIT (OUTPATIENT)
Dept: INTERNAL MEDICINE CLINIC | Facility: CLINIC | Age: 48
End: 2023-11-10
Payer: COMMERCIAL

## 2023-11-10 VITALS
HEART RATE: 89 BPM | TEMPERATURE: 97 F | RESPIRATION RATE: 16 BRPM | BODY MASS INDEX: 29.06 KG/M2 | HEIGHT: 63 IN | OXYGEN SATURATION: 98 % | DIASTOLIC BLOOD PRESSURE: 68 MMHG | SYSTOLIC BLOOD PRESSURE: 128 MMHG | WEIGHT: 164 LBS

## 2023-11-10 DIAGNOSIS — L23.9 ALLERGIC DERMATITIS: Primary | ICD-10-CM

## 2023-11-14 ENCOUNTER — PATIENT MESSAGE (OUTPATIENT)
Dept: INTERNAL MEDICINE CLINIC | Facility: CLINIC | Age: 48
End: 2023-11-14

## 2023-11-14 DIAGNOSIS — L23.9 ALLERGIC DERMATITIS: Primary | ICD-10-CM

## 2023-11-14 RX ORDER — PREDNISONE 20 MG/1
20 TABLET ORAL DAILY
Qty: 4 TABLET | Refills: 0 | Status: SHIPPED | OUTPATIENT
Start: 2023-11-14

## 2023-11-24 DIAGNOSIS — F51.04 PSYCHOPHYSIOLOGICAL INSOMNIA: ICD-10-CM

## 2023-11-24 NOTE — TELEPHONE ENCOUNTER
Medication requested: traZODone 50 MG Oral Tab     Is patient requesting 30 or 90 day supply:  90    Pharmacy name/location:  [facility]/pharmacy [address] [place] [address].  [phone], [phone]     LOV:  11/10/2023 W: Jossie Bhat       Is the patient due for appointment: No

## 2023-11-27 RX ORDER — TRAZODONE HYDROCHLORIDE 50 MG/1
50 TABLET ORAL NIGHTLY PRN
Qty: 30 TABLET | Refills: 2 | Status: SHIPPED | OUTPATIENT
Start: 2023-11-27

## 2023-12-07 DIAGNOSIS — F51.04 PSYCHOPHYSIOLOGICAL INSOMNIA: ICD-10-CM

## 2023-12-08 RX ORDER — TRAZODONE HYDROCHLORIDE 50 MG/1
50 TABLET ORAL NIGHTLY PRN
Qty: 90 TABLET | Refills: 0 | OUTPATIENT
Start: 2023-12-08

## 2023-12-11 DIAGNOSIS — E10.29 MICROALBUMINURIA DUE TO TYPE 1 DIABETES MELLITUS: ICD-10-CM

## 2023-12-11 DIAGNOSIS — R80.9 MICROALBUMINURIA DUE TO TYPE 1 DIABETES MELLITUS: ICD-10-CM

## 2023-12-11 RX ORDER — RAMIPRIL 2.5 MG/1
CAPSULE ORAL
Qty: 90 CAPSULE | Refills: 0 | Status: SHIPPED | OUTPATIENT
Start: 2023-12-11

## 2023-12-11 NOTE — TELEPHONE ENCOUNTER
Last time medication was refilled 9/15/23  Quantity and # of refills 90 w 0  Last OV 11/10/23  Next OV 4/2/24  Passed protocol, Rx sent.

## 2023-12-12 ENCOUNTER — LAB ENCOUNTER (OUTPATIENT)
Dept: LAB | Age: 48
End: 2023-12-12
Attending: INTERNAL MEDICINE
Payer: COMMERCIAL

## 2023-12-12 DIAGNOSIS — I25.10 CAD (CORONARY ARTERY DISEASE): Primary | ICD-10-CM

## 2023-12-12 DIAGNOSIS — E03.8 SUBCLINICAL HYPOTHYROIDISM: ICD-10-CM

## 2023-12-12 DIAGNOSIS — E10.65 CONTROLLED TYPE 1 DIABETES MELLITUS WITH HYPERGLYCEMIA (HCC): ICD-10-CM

## 2023-12-12 DIAGNOSIS — E78.2: ICD-10-CM

## 2023-12-12 DIAGNOSIS — E10.69: ICD-10-CM

## 2023-12-12 LAB
ALBUMIN SERPL-MCNC: 3.7 G/DL (ref 3.4–5)
ALBUMIN/GLOB SERPL: 1.1 {RATIO} (ref 1–2)
ALP LIVER SERPL-CCNC: 103 U/L
ALT SERPL-CCNC: 81 U/L
ANION GAP SERPL CALC-SCNC: 3 MMOL/L (ref 0–18)
AST SERPL-CCNC: 50 U/L (ref 15–37)
BILIRUB SERPL-MCNC: 0.4 MG/DL (ref 0.1–2)
BUN BLD-MCNC: 19 MG/DL (ref 9–23)
CALCIUM BLD-MCNC: 9.6 MG/DL (ref 8.5–10.1)
CHLORIDE SERPL-SCNC: 105 MMOL/L (ref 98–112)
CO2 SERPL-SCNC: 30 MMOL/L (ref 21–32)
CREAT BLD-MCNC: 0.98 MG/DL
EGFRCR SERPLBLD CKD-EPI 2021: 72 ML/MIN/1.73M2 (ref 60–?)
GLOBULIN PLAS-MCNC: 3.4 G/DL (ref 2.8–4.4)
GLUCOSE BLD-MCNC: 137 MG/DL (ref 70–99)
OSMOLALITY SERPL CALC.SUM OF ELEC: 290 MOSM/KG (ref 275–295)
POTASSIUM SERPL-SCNC: 4.1 MMOL/L (ref 3.5–5.1)
PROT SERPL-MCNC: 7.1 G/DL (ref 6.4–8.2)
SODIUM SERPL-SCNC: 138 MMOL/L (ref 136–145)
T4 FREE SERPL-MCNC: 0.9 NG/DL (ref 0.8–1.7)
TSI SER-ACNC: 5.29 MIU/ML (ref 0.36–3.74)

## 2023-12-12 PROCEDURE — 80053 COMPREHEN METABOLIC PANEL: CPT

## 2023-12-12 PROCEDURE — 36415 COLL VENOUS BLD VENIPUNCTURE: CPT

## 2023-12-12 PROCEDURE — 84439 ASSAY OF FREE THYROXINE: CPT

## 2023-12-12 PROCEDURE — 84443 ASSAY THYROID STIM HORMONE: CPT

## 2024-01-05 ENCOUNTER — PATIENT MESSAGE (OUTPATIENT)
Dept: INTERNAL MEDICINE CLINIC | Facility: CLINIC | Age: 49
End: 2024-01-05

## 2024-01-05 DIAGNOSIS — R74.8 ELEVATED LIVER ENZYMES: Primary | ICD-10-CM

## 2024-01-05 NOTE — TELEPHONE ENCOUNTER
From: Sherlyn Martinez  To: Tila Hu  Sent: 1/5/2024 10:07 AM CST  Subject: Abdominal Ultrasound and GI    Ricarda Skaggs, NANCI from my cardiologist called. She said that my liver enzymes were slightly higher than last time and she asked that I reach out to my PCP to get an order for an abdominal ultrasound and also a referral to a GI doctor. Can you help me with both things?    Thanks,  Sherlyn

## 2024-02-09 ENCOUNTER — HOSPITAL ENCOUNTER (OUTPATIENT)
Dept: ULTRASOUND IMAGING | Age: 49
Discharge: HOME | End: 2024-02-09
Attending: PHYSICIAN ASSISTANT
Payer: COMMERCIAL

## 2024-02-09 DIAGNOSIS — R74.8 ELEVATED LIVER ENZYMES: ICD-10-CM

## 2024-02-09 DIAGNOSIS — K80.80 BILIARY CALCULUS OF OTHER SITE WITHOUT OBSTRUCTION: Primary | ICD-10-CM

## 2024-02-09 PROCEDURE — 76700 US EXAM ABDOM COMPLETE: CPT | Performed by: PHYSICIAN ASSISTANT

## 2024-03-06 DIAGNOSIS — R80.9 MICROALBUMINURIA DUE TO TYPE 1 DIABETES MELLITUS (HCC): ICD-10-CM

## 2024-03-06 DIAGNOSIS — E10.29 MICROALBUMINURIA DUE TO TYPE 1 DIABETES MELLITUS (HCC): ICD-10-CM

## 2024-03-06 RX ORDER — RAMIPRIL 2.5 MG/1
CAPSULE ORAL
Qty: 90 CAPSULE | Refills: 0 | Status: SHIPPED | OUTPATIENT
Start: 2024-03-06

## 2024-03-06 NOTE — TELEPHONE ENCOUNTER
Last time medication was refilled 12/11/2023  Quantity and # of refills 90 w/ 0  Last OV 11/10/2023  Next OV   Future Appointments   Date Time Provider Department Center   4/2/2024  9:00 AM Tila Hu PA-C [facility] [address]   5/3/2024  8:30 AM Mary De La Cruz MD [facility]     Passed protocol, Rx sent.

## 2024-04-02 ENCOUNTER — OFFICE VISIT (OUTPATIENT)
Dept: INTERNAL MEDICINE CLINIC | Facility: CLINIC | Age: 49
End: 2024-04-02
Payer: COMMERCIAL

## 2024-04-02 ENCOUNTER — LAB ENCOUNTER (OUTPATIENT)
Dept: LAB | Age: 49
End: 2024-04-02
Attending: STUDENT IN AN ORGANIZED HEALTH CARE EDUCATION/TRAINING PROGRAM
Payer: COMMERCIAL

## 2024-04-02 VITALS
WEIGHT: 160 LBS | RESPIRATION RATE: 16 BRPM | OXYGEN SATURATION: 99 % | BODY MASS INDEX: 28.35 KG/M2 | DIASTOLIC BLOOD PRESSURE: 68 MMHG | TEMPERATURE: 97 F | HEIGHT: 63 IN | SYSTOLIC BLOOD PRESSURE: 118 MMHG | HEART RATE: 94 BPM

## 2024-04-02 DIAGNOSIS — E03.8 SUBCLINICAL HYPOTHYROIDISM: ICD-10-CM

## 2024-04-02 DIAGNOSIS — E10.69 HYPERLIPIDEMIA DUE TO TYPE 1 DIABETES MELLITUS (HCC): ICD-10-CM

## 2024-04-02 DIAGNOSIS — R93.1 AGATSTON CORONARY ARTERY CALCIUM SCORE GREATER THAN 400: ICD-10-CM

## 2024-04-02 DIAGNOSIS — E10.65 TYPE 1 DIABETES MELLITUS WITH HYPERGLYCEMIA (HCC): ICD-10-CM

## 2024-04-02 DIAGNOSIS — E10.65 TYPE 1 DIABETES MELLITUS WITH HYPERGLYCEMIA (HCC): Primary | ICD-10-CM

## 2024-04-02 DIAGNOSIS — R74.8 ELEVATED LIVER ENZYMES: ICD-10-CM

## 2024-04-02 DIAGNOSIS — E78.5 HYPERLIPIDEMIA DUE TO TYPE 1 DIABETES MELLITUS (HCC): ICD-10-CM

## 2024-04-02 LAB
ALBUMIN SERPL-MCNC: 4 G/DL (ref 3.4–5)
ALBUMIN/GLOB SERPL: 1.1 {RATIO} (ref 1–2)
ALP LIVER SERPL-CCNC: 88 U/L
ALT SERPL-CCNC: 50 U/L
ANION GAP SERPL CALC-SCNC: 5 MMOL/L (ref 0–18)
AST SERPL-CCNC: 30 U/L (ref 15–37)
BASOPHILS # BLD AUTO: 0.1 X10(3) UL (ref 0–0.2)
BASOPHILS NFR BLD AUTO: 1.5 %
BILIRUB SERPL-MCNC: 0.4 MG/DL (ref 0.1–2)
BUN BLD-MCNC: 18 MG/DL (ref 9–23)
CALCIUM BLD-MCNC: 10.2 MG/DL (ref 8.5–10.1)
CERULOPLASMIN SERPL-MCNC: 30.7 MG/DL (ref 20–60)
CHLORIDE SERPL-SCNC: 109 MMOL/L (ref 98–112)
CO2 SERPL-SCNC: 26 MMOL/L (ref 21–32)
CREAT BLD-MCNC: 1.18 MG/DL
DEPRECATED HBV CORE AB SER IA-ACNC: 135 NG/ML
EGFRCR SERPLBLD CKD-EPI 2021: 57 ML/MIN/1.73M2 (ref 60–?)
EOSINOPHIL # BLD AUTO: 0.45 X10(3) UL (ref 0–0.7)
EOSINOPHIL NFR BLD AUTO: 6.8 %
ERYTHROCYTE [DISTWIDTH] IN BLOOD BY AUTOMATED COUNT: 12.2 %
EST. AVERAGE GLUCOSE BLD GHB EST-MCNC: 206 MG/DL (ref 68–126)
FASTING STATUS PATIENT QL REPORTED: YES
GLOBULIN PLAS-MCNC: 3.5 G/DL (ref 2.8–4.4)
GLUCOSE BLD-MCNC: 191 MG/DL (ref 70–99)
HAV AB SER QL IA: NONREACTIVE
HBA1C MFR BLD: 8.8 % (ref ?–5.7)
HBV SURFACE AB SER QL: NONREACTIVE
HBV SURFACE AB SERPL IA-ACNC: <3.1 MIU/ML
HBV SURFACE AG SER-ACNC: <0.1 [IU]/L
HBV SURFACE AG SERPL QL IA: NONREACTIVE
HCT VFR BLD AUTO: 39.7 %
HCV AB SERPL QL IA: NONREACTIVE
HGB BLD-MCNC: 13.6 G/DL
IMM GRANULOCYTES # BLD AUTO: 0.01 X10(3) UL (ref 0–1)
IMM GRANULOCYTES NFR BLD: 0.2 %
IRON SATN MFR SERPL: 30 %
IRON SERPL-MCNC: 95 UG/DL
LYMPHOCYTES # BLD AUTO: 2.04 X10(3) UL (ref 1–4)
LYMPHOCYTES NFR BLD AUTO: 30.8 %
MCH RBC QN AUTO: 30.4 PG (ref 26–34)
MCHC RBC AUTO-ENTMCNC: 34.3 G/DL (ref 31–37)
MCV RBC AUTO: 88.8 FL
MONOCYTES # BLD AUTO: 0.62 X10(3) UL (ref 0.1–1)
MONOCYTES NFR BLD AUTO: 9.4 %
NEUTROPHILS # BLD AUTO: 3.41 X10 (3) UL (ref 1.5–7.7)
NEUTROPHILS # BLD AUTO: 3.41 X10(3) UL (ref 1.5–7.7)
NEUTROPHILS NFR BLD AUTO: 51.3 %
OSMOLALITY SERPL CALC.SUM OF ELEC: 297 MOSM/KG (ref 275–295)
PLATELET # BLD AUTO: 271 10(3)UL (ref 150–450)
POTASSIUM SERPL-SCNC: 3.9 MMOL/L (ref 3.5–5.1)
PROT SERPL-MCNC: 7.5 G/DL (ref 6.4–8.2)
RBC # BLD AUTO: 4.47 X10(6)UL
SODIUM SERPL-SCNC: 140 MMOL/L (ref 136–145)
T4 FREE SERPL-MCNC: 1 NG/DL (ref 0.8–1.7)
TIBC SERPL-MCNC: 316 UG/DL (ref 240–450)
TRANSFERRIN SERPL-MCNC: 212 MG/DL (ref 200–360)
TSI SER-ACNC: 6.19 MIU/ML (ref 0.36–3.74)
WBC # BLD AUTO: 6.6 X10(3) UL (ref 4–11)

## 2024-04-02 PROCEDURE — 86039 ANTINUCLEAR ANTIBODIES (ANA): CPT

## 2024-04-02 PROCEDURE — 86038 ANTINUCLEAR ANTIBODIES: CPT

## 2024-04-02 PROCEDURE — 83550 IRON BINDING TEST: CPT

## 2024-04-02 PROCEDURE — 83540 ASSAY OF IRON: CPT

## 2024-04-02 PROCEDURE — 87340 HEPATITIS B SURFACE AG IA: CPT

## 2024-04-02 PROCEDURE — 80053 COMPREHEN METABOLIC PANEL: CPT

## 2024-04-02 PROCEDURE — 84439 ASSAY OF FREE THYROXINE: CPT

## 2024-04-02 PROCEDURE — 86803 HEPATITIS C AB TEST: CPT

## 2024-04-02 PROCEDURE — 85025 COMPLETE CBC W/AUTO DIFF WBC: CPT

## 2024-04-02 PROCEDURE — 84443 ASSAY THYROID STIM HORMONE: CPT

## 2024-04-02 PROCEDURE — 82728 ASSAY OF FERRITIN: CPT

## 2024-04-02 PROCEDURE — 86706 HEP B SURFACE ANTIBODY: CPT

## 2024-04-02 PROCEDURE — 83516 IMMUNOASSAY NONANTIBODY: CPT

## 2024-04-02 PROCEDURE — 36415 COLL VENOUS BLD VENIPUNCTURE: CPT

## 2024-04-02 PROCEDURE — 86708 HEPATITIS A ANTIBODY: CPT

## 2024-04-02 PROCEDURE — 83036 HEMOGLOBIN GLYCOSYLATED A1C: CPT

## 2024-04-02 PROCEDURE — 99214 OFFICE O/P EST MOD 30 MIN: CPT | Performed by: PHYSICIAN ASSISTANT

## 2024-04-02 PROCEDURE — 82390 ASSAY OF CERULOPLASMIN: CPT

## 2024-04-02 NOTE — PATIENT INSTRUCTIONS
Voltaren gel as needed for hands. Can take Aleve intermittently but avoid daily use.    Cardiologists we recommend:  Through [facility], Dr. Иван Shaw, Dr. Edin Gonzáles, Dr. Mary Fountain  Through [facility], Dr. Yandel Mo, Dr. Jalil Smith  Through [facility], Dr. Bryant Serra, Dr. Roverto Lepe    Schedule with gynecology for PAP

## 2024-04-02 NOTE — PROGRESS NOTES
Sherlyn Martinez is a 48 year old female.  HPI:   Pt here for f/up for DM, HLD   Has started working a new job with [employer], which makes a 'bionic pancreas', adjusts automatically; still uses dexcom sensor. Has been using the automatic pump for 1 week.   for IL, stress is high, blood sugar has been high    Had allergic reaction around eyes, unknown cause; resolved with prednisone or claritin; has not occurred in the last 2 mos    Fatty liver dx with GI; runs in family. Is making lifestyle changes, goal to lose 20 lbs, has labs in process and will complete US elastography in June.    Current Outpatient Medications   Medication Sig Dispense Refill   • Ascorbic Acid (VITAMIN C OR) Take by mouth.     • RAMIPRIL 2.5 MG Oral Cap TAKE 1 CAPSULE BY MOUTH AT BEDTIME TO PROTECT KIDNEYS 90 capsule 0   • ZETIA 10 MG Oral Tab Zetia 10 mg tablet, [RxNorm: 009697]     • rosuvastatin 40 MG Oral Tab Take 1 tablet (40 mg total) by mouth every evening.     • Glucose Blood (ACCU-CHEK GUIDE) In Vitro Strip 1 Lancet by Finger stick route 4 (four) times daily. 400 strip 3   • NOVOLOG 100 UNIT/ML Injection Solution INJECT 70 UNITS SUBCUTANEOUSLY ONCE DAILY 70 mL 0   • Continuous Blood Gluc Transmit (GUARDIAN LINK 3 TRANSMITTER) Does not apply Misc      • Insulin Infusion Pump (MINIMED 770G INSULIN PUMP SYS) Does not apply Kit Basal rate:  12-3:30 AM: 0.7 units  3:30-7:30 PM: 0.9 units  7:30 PM-12:00 AM: 0.7 units    ICR: 10:1  CF/Sensitivity: 1:40  Target Glucose:      • Cholecalciferol (VITAMIN D) 50 MCG (2000 UT) Oral Cap Take 50 capsules (100,000 Units total) by mouth daily.        Past Medical History:   Diagnosis Date   • Abdominal pain     Slight pains cone and go   • Actinic keratosis    • Arthritis    • Back pain     Lower back   • Benign neoplasm of transverse colon 11/11/2021   • Bloating     Feel bloated   • Breast cancer (HCC)    • Calculus of kidney    • Diabetes mellitus (HCC)    • Dizziness      Twice in 4-5 months felt faint   • Fatigue     Working 12 hours a day   • High cholesterol    • IDDM (insulin dependent diabetes mellitus)     diagnosed 1999 - insulin pump   • Night sweats     Periodically woth menopause   • Other and unspecified hyperlipidemia    • Pain in joints    • Stress    • Type 1 diabetes mellitus (HCC)    • Visual impairment     reading glasses   • Wears glasses     Reading glasses      Social History:  Social History     Socioeconomic History   • Marital status: Single   Occupational History   • 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 Denies fever, chills, unintentional weight change  SKIN: denies any unusual skin lesions or rashes  RESPIRATORY: denies shortness of breath with exertion, denies cough or wheezing  CARDIOVASCULAR: denies chest pain or palpitations, denies leg swelling  GI: denies abdominal pain and denies heartburn. Denies nausea, vomiting, diarrhea, constipation  NEURO: denies headaches, dizziness, weakness, syncope    EXAM:   /68   Pulse 94   Temp 97.3 °F (36.3 °C)   Resp 16   Ht 5' 3\" (1.6 m)   Wt 160 lb (72.6 kg)   LMP 07/31/2023 (Within Days)   SpO2 99%   BMI 28.34 kg/m²   GENERAL: well developed, well nourished,in no apparent distress  SKIN: no rashes,no suspicious lesions, warm and dry  HEENT: atraumatic, normocephalic,ears and throat are clear  NECK: supple,no adenopathy, no thyromegaly  LUNGS: clear to auscultation b/l no W/R/R  CARDIO: RRR without murmur  GI: good BS's,no masses, HSM,  distension or tenderness  EXTREMITIES: no cyanosis, clubbing or edema  MUSCULOSKELETAL: FROM, no joint swelling or bony tenderness  NEURO: a/ox3, no focal deficits  PSYCH: mood and affect normal  Bilateral barefoot skin diabetic exam is normal, visualized feet and the appearance is normal.  Bilateral monofilament/sensation of both feet is normal.  Pulsation pedal pulse exam of both lower legs/feet is normal as well.       ASSESSMENT AND PLAN:   1. Type 1 diabetes mellitus with hyperglycemia (HCC) (Primary)  -     Hemoglobin A1C; Future; Expected date: 04/02/2024  Most recent A1C 8.0%, A1C added on to today's labs.   Eye exam utd, requested record  Continue insulin therapy with new device, monitor carefully  2. Agatston coronary artery calcium score greater than 400- risk managed with cholesterol at goal, BP normal, optimize blood sugar control.  3. Subclinical hypothyroidism  -     TSH W Reflex To Free T4; Future; Expected date: 04/02/2024  4. Hyperlipidemia due to type 1 diabetes mellitus (HCC)- with high risk for CAD, high CAC score. Continue high-dose statin and zetia.        reminded pt to schedule PAP with gyne  The patient indicates understanding of these issues and agrees to the plan.  Return in about 6 months (around 10/2/2024) for routine physical, or sooner as needed.

## 2024-04-03 LAB — ACTIN SMOOTH MUSCLE AB: 16 UNITS

## 2024-04-04 LAB — NUCLEAR IGG TITR SER IF: POSITIVE {TITER}

## 2024-04-05 LAB — ANA NUCLEOLAR TITR SER IF: 640 {TITER}

## 2024-04-08 NOTE — PROGRESS NOTES
Sherlyn,    The liver enzymes are improving.   Continue to focus on weight loss through diet and exercise, as we discussed.  The labs for autoimmune hepatitis returned negative, but the LEN, a non-specific marker for many autoimmune diseases returned positive.  This doesn't not mean you have an autoimmune disease now but indicates some increased likelihood of autoimmune disease either in the past or in the future.  There are no other causes identified for causes of chronic liver disease.  Lastly, the renal function marker is slightly higher than usual, which can indicate a temporary kidney injury from dehydration.  Please update your renal panel in 1 week.  Please repeat labs for liver inflammation again in 3 months.  I've routed the results to your primary care team as an FYI.  Please call with any questions,    Marco A Gramajo MD

## 2024-04-15 ENCOUNTER — MED REC SCAN ONLY (OUTPATIENT)
Dept: INTERNAL MEDICINE CLINIC | Facility: CLINIC | Age: 49
End: 2024-04-15

## 2024-04-16 ENCOUNTER — TELEPHONE (OUTPATIENT)
Dept: INTERNAL MEDICINE CLINIC | Facility: CLINIC | Age: 49
End: 2024-04-16

## 2024-04-16 NOTE — TELEPHONE ENCOUNTER
Spoke with pt, she is requesting this form completed and faxed back to [employer]   Form completed and placed in Yousif's inbox for signature   To be faxed to provided number and send to scan

## 2024-04-16 NOTE — TELEPHONE ENCOUNTER
[employer] insulin pump prescription fax received. Reviewed with Yousif. Patient does wear pump. And works for company. Will sign prescription if patient needs. LVM [facility]. Form placed in pending forms bin.

## 2024-05-03 ENCOUNTER — OFFICE VISIT (OUTPATIENT)
Dept: SURGERY | Facility: CLINIC | Age: 49
End: 2024-05-03
Payer: COMMERCIAL

## 2024-05-03 VITALS
OXYGEN SATURATION: 99 % | BODY MASS INDEX: 29 KG/M2 | SYSTOLIC BLOOD PRESSURE: 115 MMHG | WEIGHT: 165.19 LBS | TEMPERATURE: 98 F | RESPIRATION RATE: 18 BRPM | HEART RATE: 79 BPM | DIASTOLIC BLOOD PRESSURE: 58 MMHG

## 2024-05-03 DIAGNOSIS — Z90.13 ABSENCE OF BOTH BREASTS: ICD-10-CM

## 2024-05-03 DIAGNOSIS — C50.919: Primary | ICD-10-CM

## 2024-05-03 PROCEDURE — 99213 OFFICE O/P EST LOW 20 MIN: CPT | Performed by: SURGERY

## 2024-05-03 NOTE — PROGRESS NOTES
Breast Surgery Surveillance Visit    Diagnosis: Abnormal mammogram with discordant benign results s/p right breast excisional biopsy on 6/6/2022 with confirmation of microinvasive DCIS now status post bilateral mastectomies with right sentinel lymph node biopsy and reconstruction.    Stage: T1 MI N0Mx    Disease Status:  Surgical treatment complete, no further treatment recommended.    History of Present Illness:   Ms. Sherlyn Martinez is a 48 year old woman who presents with a septated palpable mass of the right breast.  The patient reports she felt this in March 2022.  It has not increased in size or symptomatology since this discovery.  She has no personal prior history of breast disease or biopsies and no known family history of breast cancer.  Her last screening mammogram in June 2021 was unremarkable.  Secondary to the new palpable finding she was referred for right diagnostic evaluation on April 26, 2022 that showed heterogeneously dense breast tissue with a well-circumscribed hypoechoic nodule measuring up to 1.1 cm at the 9 o'clock position with unremarkable right axillary lymph nodes.  Biopsy of the right breast mass was recommended took place on April 27, 2022.  This confirmed infarcted tissue with a reactive fibroinflammatory reaction which could not be distinguished from a necrotic neoplasm because of its pattern and surgical excision was recommended.  This took place without complication.  Been upgraded to Microinvasive breast cancer. She underwent bilateral mastectomy with right SLNB, which occurred without complication.  She underwent her implant exchange in December 2022 without complication and has no new clinical concerns bilaterally.  She is here today for evaluation and recommendations for further therapy.        Past Medical History:    Abdominal pain    Slight pains cone and go    Actinic keratosis    Arthritis    Back pain    Lower back    Benign neoplasm of transverse colon    Bloating     Feel bloated    Breast cancer (HCC)    Calculus of kidney    Diabetes mellitus (HCC)    Dizziness    Twice in 4-5 months felt faint    Fatigue    Working 12 hours a day    High cholesterol    IDDM (insulin dependent diabetes mellitus)    diagnosed 1999 - insulin pump    Night sweats    Periodically woth menopause    Other and unspecified hyperlipidemia    Pain in joints    Stress    Type 1 diabetes mellitus (HCC)    Visual impairment    reading glasses    Wears glasses    Reading glasses       Past Surgical History:   Procedure Laterality Date    Breast reconstruction Bilateral 07/13/2022    Bilateral immediate breast reconstruction with tissue expander and acellular dermal matrix. ([facility])    Colonoscopy  11/2021    Colonoscopy  11/11/21    Repeat in a year.    Lumpectomy right  06/06/2022    Right breast wire localized lumpectomy with right breast specimen radiography. ([facility])    Mastectomy modified radical Bilateral 07/13/2022    Bilateral breast nipple versus skin sparing mastectomies, right lymphoscintigraphy, right sentinel lymph node biopsy ([facility])     Other surgical history      [place] teeth       Gynecological History:  Pt is nulliparous.  She achieved menarche at age 16  She denies any history of oral contraceptive use.  She denies infertility treatment to achieve pregnancy.    Medications:     Ascorbic Acid (VITAMIN C OR) Take by mouth.      RAMIPRIL 2.5 MG Oral Cap TAKE 1 CAPSULE BY MOUTH AT BEDTIME TO PROTECT KIDNEYS 90 capsule 0    ZETIA 10 MG Oral Tab Zetia 10 mg tablet, [RxNorm: 232664]      rosuvastatin 40 MG Oral Tab Take 1 tablet (40 mg total) by mouth every evening.      Glucose Blood (ACCU-CHEK GUIDE) In Vitro Strip 1 Lancet by Finger stick route 4 (four) times daily. 400 strip 3    NOVOLOG 100 UNIT/ML Injection Solution INJECT 70 UNITS SUBCUTANEOUSLY ONCE DAILY 70 mL 0    Continuous Blood Gluc Transmit (GUARDIAN LINK 3 TRANSMITTER) Does not apply Misc       Insulin Infusion Pump (MINIMED 770G  INSULIN PUMP SYS) Does not apply Kit Basal rate:  12-3:30 AM: 0.7 units  3:30-7:30 PM: 0.9 units  7:30 PM-12:00 AM: 0.7 units    ICR: 10:1  CF/Sensitivity: 1:40  Target Glucose:       Cholecalciferol (VITAMIN D) 50 MCG (2000 UT) Oral Cap Take 50 capsules (100,000 Units total) by mouth daily.         Allergies:    No Known Allergies    Family History:   Family History   Problem Relation Age of Onset    Diabetes Mother         Diabetes, blood pressure, thyroid    Colon Polyps Mother     Heart Disorder Father 61        MI x 2    Lipids Father     Colon Polyps Father     Other (Other) Father         Stroke    Stroke Father     Diabetes Brother         Stroke, Thyroid, Shingles    Stroke Brother 46    Diabetes Brother         Heart attack    Dementia Maternal Grandmother     Heart Attack Maternal Grandfather     Other (Other) Paternal Grandmother 69        brain tumor/cancer    Heart Attack Paternal Grandfather     Other (Other) Paternal Uncle         possible stomach cancer dx 70s    Cancer Maternal Great-Grandmother         dt gynecological cancer, possible ovarian cancer, dx >50y       She is not of Ashkenazi Muslim ancestry.    Social History:  History   Alcohol Use    Yes     Comment: 1 per month       History   Smoking Status    Never   Smokeless Tobacco    Never   The patient is single.  She has no children.  She is employed full-time.    Review of Systems:  General:   The patient denies, fever, chills, night sweats, fatigue, generalized weakness, change in appetite or weight loss.    HEENT:     The patient denies eye irritation, cataracts, redness, glaucoma, yellowing of the eyes, change in vision or color blindness. The patient denies hearing loss, ringing in the ears, ear drainage, earaches, nasal congestion, nose bleeds, snoring, pain in mouth/throat, hoarseness, change in voice, facial trauma.    Respiratory:  The patient denies chronic cough, phlegm, hemoptysis, pleurisy/chest pain, pneumonia, asthma,  wheezing, difficulty in breathing with exertion, emphysema, chronic bronchitis, shortness of breath or abnormal sound when breathing.     Cardiovascular:  There is no history of chest pain, chest pressure/discomfort, palpitations, irregular heartbeat, fainting or near-fainting, difficulty breathing when lying flat, SOB/Coughing at night, swelling of the legs or chest pain while walking.    Breasts:  See history of present illness    Gastrointestinal:     There is no history of difficulty or pain with swallowing, reflux symptoms, vomiting, dark or bloody stools, constipation, yellowing of the skin, indigestion, nausea, change in bowel habits, diarrhea, abdominal pain or vomiting blood.     Genitourinary:  The patient denies frequent urination, needing to get up at night to urinate, urinary hesitancy or retaining urine, painful urination, urinary incontinence, decreased urine stream, blood in the urine or vaginal/penile discharge.    Skin:    The patient denies rash, itching, skin lesions, dry skin, change in skin color or change in moles.     Hematologic/Lymphatic:  The patient denies easily bruising or bleeding or persistent swollen glands or lymph nodes.     Musculoskeletal:  The patient denies muscle aches/pain, joint pain, stiff joints, neck pain, back pain or bone pain.    Neuropsychiatric:  There is no history of migraines or severe headaches, seizure/epilepsy, speech problems, coordination problems, trembling/tremors, fainting/black outs, dizziness, memory problems, loss of sensation/numbness, problems walking, weakness, tingling or burning in hands/feet. There is no history of abusive relationship, bipolar disorder, sleep disturbance, anxiety, depression or feeling of despair.    Endocrine:    There is no history of poor/slow wound healing, weight loss/gain, fertility or hormone problems, cold intolerance, thyroid disease.     Allergic/Immunologic:  There is no history of hives, hay fever, angioedema or  anaphylaxis.    /58 (BP Location: Left arm, Patient Position: Sitting, Cuff Size: adult)   Pulse 79   Temp 97.5 °F (36.4 °C) (Temporal)   Resp 18   Wt 74.9 kg (165 lb 3.2 oz)   LMP 07/31/2023 (Within Days)   SpO2 99%   BMI 29.26 kg/m²     Physical Exam:  The patient is an alert, oriented, well-nourished and  well-developed woman who appears her stated age. Her speech patterns and movements are normal. Her affect is appropriate.    HEENT: The head is normocephalic. The neck is supple. The thyroid is not enlarged and is without palpable masses/nodules. There are no palpable masses. The trachea is in the midline. Conjunctiva are clear, non-icteric.    Chest: The chest expands symmetrically. The lungs are clear to auscultation.    Heart: The rhythm is regular.  There are no murmurs, rubs, gallops or thrills.    Breasts:  Her breasts are surgically absent.  Skin flaps are well perfused.  There are no palpable chest wall nodules, skin changes and/or axillary adenopathy appreciated bilaterally.    Abdomen:  The abdomen is soft, flat and non tender. The liver is not enlarged. There are no palpable masses.    Lymph Nodes:  The supraclavicular, axillary and cervical regions are free of significant lymphadenopathy.    Back: There is no vertebral column tenderness.    Skin: The skin appears normal. There are no suspicious appearing rashes or lesions.    Extremities: The extremities are without deformity, cyanosis or edema.    Impression: 48-year-old female status post wide local excision extensive microinvasive ductal carcinoma in situ and family history of breast cancer, s/p bilateral mastectomy.    Recommendations:   I had a discussion with the Patient regarding her breast exam.  She is healing well since surgery with no signs of new or recurrent disease. I personally reviewed her pathology.  She had no residual disease in the breast.  Her case was presented at multidisciplinary tumor board given the small  initial microinvasive concern in the setting of bilateral mastectomy no further systemic treatment was recommended.  She will need no further mammograms status post bilateral mastectomies.   She will see me in 12 months for her next clinical exam.  I encouraged her to continue monitoring her ROM and strength and explained that a referral to physical therapy may be warranted in the future if she identifies any limitations or restrictions. She was given ample opportunity for questions and those questions were answered to her satisfaction. She was encouraged to contact the office with any questions or concerns prior to her next scheduled appointment.     This encounter lasted a total of 25 minutes, more than 50% of which was dedicated to the discussion of management options.

## 2024-05-15 ENCOUNTER — MED REC SCAN ONLY (OUTPATIENT)
Dept: INTERNAL MEDICINE CLINIC | Facility: CLINIC | Age: 49
End: 2024-05-15

## 2024-05-31 DIAGNOSIS — E10.29 MICROALBUMINURIA DUE TO TYPE 1 DIABETES MELLITUS (HCC): ICD-10-CM

## 2024-05-31 DIAGNOSIS — R80.9 MICROALBUMINURIA DUE TO TYPE 1 DIABETES MELLITUS (HCC): ICD-10-CM

## 2024-05-31 RX ORDER — RAMIPRIL 2.5 MG/1
CAPSULE ORAL
Qty: 90 CAPSULE | Refills: 0 | Status: SHIPPED | OUTPATIENT
Start: 2024-05-31

## 2024-05-31 NOTE — TELEPHONE ENCOUNTER
Last time medication was refilled 03/06/2024  Last office visit  04/02/2024  Next office visit due/scheduled   Future Appointments   Date Time Provider Department Center   6/21/2024 11:45 AM Tarun Arellano MD [facility]   10/4/2024  9:00 AM Tila Hu PA-C [facility] [address] [address]   5/6/2025  8:30 AM Mary De La Cruz MD [facility]         Passed protocol, Medication sent.

## 2024-06-21 ENCOUNTER — OFFICE VISIT (OUTPATIENT)
Dept: SURGERY | Facility: CLINIC | Age: 49
End: 2024-06-21

## 2024-06-21 DIAGNOSIS — Z90.13 ABSENCE OF BOTH BREASTS: Primary | ICD-10-CM

## 2024-06-21 PROCEDURE — 99212 OFFICE O/P EST SF 10 MIN: CPT | Performed by: SURGERY

## 2024-06-21 NOTE — PROGRESS NOTES
Sherlyn Martinez is a 48 year old female who presents today for a yearly follow-up.  She is without new complaints.  Specifically, she denies any masses, skin changes, or nipple discharge.  History of bilateral mastectomy and reconstruction with style  cc implants in December 2022      Physical Examination:  HEENT: There is no cervical or supraclavicular lymphadenopathy noted.    Breasts: Bilateral breast implants are noted to have anterior posterior malposition.  These are easily manipulated the proper anatomic configuration.  Bilateral breasts are soft without palpable masses.  There are no palpable nodules noted.  There is no axillary lymphadenopathy noted bilaterally.  There is no erythema or seroma noted.    Assessment and Plan:  Patient is doing well.  We reviewed the recommended FDA surveillance protocol for silicone implants.  We discussed continuing regular self breast examination with a plan for follow-up in 1 year or sooner if any changes are detected.  The plan was reviewed with the patient and questions were answered.

## 2024-08-24 DIAGNOSIS — E10.9 TYPE 1 DIABETES MELLITUS WITHOUT COMPLICATION (HCC): ICD-10-CM

## 2024-08-25 DIAGNOSIS — R80.9 MICROALBUMINURIA DUE TO TYPE 1 DIABETES MELLITUS (HCC): ICD-10-CM

## 2024-08-25 DIAGNOSIS — E10.29 MICROALBUMINURIA DUE TO TYPE 1 DIABETES MELLITUS (HCC): ICD-10-CM

## 2024-08-25 RX ORDER — RAMIPRIL 2.5 MG/1
CAPSULE ORAL
Qty: 90 CAPSULE | Refills: 0 | Status: SHIPPED | OUTPATIENT
Start: 2024-08-25

## 2024-08-25 RX ORDER — INSULIN ASPART 100 [IU]/ML
70 INJECTION, SOLUTION INTRAVENOUS; SUBCUTANEOUS DAILY
Qty: 70 ML | Refills: 0 | Status: SHIPPED | OUTPATIENT
Start: 2024-08-25

## 2024-08-25 NOTE — TELEPHONE ENCOUNTER
Last time medication was refilled: 5/31/24  Next office visit due/scheduled: 11/25/24  Last office visit: 4/2/24  Last Labs: 4/2/24

## 2024-08-25 NOTE — TELEPHONE ENCOUNTER
Last time medication was refilled: 1/8/23  Next office visit due/scheduled: 11/25/24  Last office visit: 4/2/24  Last Labs: 4/2/24 pt has orders to do

## 2024-08-27 ENCOUNTER — PATIENT MESSAGE (OUTPATIENT)
Dept: INTERNAL MEDICINE CLINIC | Facility: CLINIC | Age: 49
End: 2024-08-27

## 2024-08-27 ENCOUNTER — TELEPHONE (OUTPATIENT)
Dept: INTERNAL MEDICINE CLINIC | Facility: CLINIC | Age: 49
End: 2024-08-27

## 2024-08-27 DIAGNOSIS — E10.9 TYPE 1 DIABETES MELLITUS WITHOUT COMPLICATION (HCC): Primary | ICD-10-CM

## 2024-08-27 RX ORDER — INSULIN LISPRO 100 [IU]/ML
70 INJECTION, SOLUTION INTRAVENOUS; SUBCUTANEOUS DAILY
Qty: 70 ML | Refills: 0 | Status: SHIPPED | OUTPATIENT
Start: 2024-08-27

## 2024-08-27 NOTE — TELEPHONE ENCOUNTER
From: Sherlyn Martinez  To: Tila Hu  Sent: 8/27/2024 11:22 AM CDT  Subject: Humalog    My insurance coverage has switch from Novolog to Humalog. Can I get a prescription for Humalog and not novolog?  Thanks  Sherlyn

## 2024-08-27 NOTE — TELEPHONE ENCOUNTER
Suggested alternatives:  humalog 100 unit/ml vial, insulin lispro 100 unit/ml vial, lyumjev 100 unit/ml vial

## 2024-08-27 NOTE — TELEPHONE ENCOUNTER
Per Yousif DAVIDSON  yes - talk to patient first, she was using some kind of pump at one point so I want to make sure the humalog will work for her     Left message to call back the office to further discuss alternate insulin

## 2024-10-10 ENCOUNTER — TELEPHONE (OUTPATIENT)
Dept: INTERNAL MEDICINE CLINIC | Facility: CLINIC | Age: 49
End: 2024-10-10

## 2024-10-21 ENCOUNTER — MED REC SCAN ONLY (OUTPATIENT)
Dept: INTERNAL MEDICINE CLINIC | Facility: CLINIC | Age: 49
End: 2024-10-21

## 2024-10-28 ENCOUNTER — MED REC SCAN ONLY (OUTPATIENT)
Dept: INTERNAL MEDICINE CLINIC | Facility: CLINIC | Age: 49
End: 2024-10-28

## 2024-11-21 DIAGNOSIS — E10.29 MICROALBUMINURIA DUE TO TYPE 1 DIABETES MELLITUS (HCC): ICD-10-CM

## 2024-11-21 DIAGNOSIS — R80.9 MICROALBUMINURIA DUE TO TYPE 1 DIABETES MELLITUS (HCC): ICD-10-CM

## 2024-11-21 RX ORDER — RAMIPRIL 2.5 MG/1
CAPSULE ORAL
Qty: 90 CAPSULE | Refills: 0 | Status: SHIPPED | OUTPATIENT
Start: 2024-11-21

## 2024-11-21 NOTE — TELEPHONE ENCOUNTER
Last time medication was refilled 8/25/24  Last office visit  4/2/24  Next office visit due/scheduled   Future Appointments   Date Time Provider Department Center   11/25/2024  4:00 PM Tila Hu PA-C [facility] [address]   12/2/2024  1:30 PM [facility]   12/31/2024  9:20 AM Marco A Gramajo MD [facility]   4/1/2025  7:45 AM Ashutosh Yeboah MD [facility]   5/6/2025  8:30 AM Mary De La Cruz MD [facility]   6/13/2025  9:00 AM Tarun Arellano MD [facility]     Passed protocol, Medication sent.

## 2024-11-25 ENCOUNTER — OFFICE VISIT (OUTPATIENT)
Dept: INTERNAL MEDICINE CLINIC | Facility: CLINIC | Age: 49
End: 2024-11-25
Payer: COMMERCIAL

## 2024-11-25 ENCOUNTER — LAB ENCOUNTER (OUTPATIENT)
Dept: LAB | Age: 49
End: 2024-11-25
Attending: PHYSICIAN ASSISTANT
Payer: COMMERCIAL

## 2024-11-25 VITALS
RESPIRATION RATE: 16 BRPM | HEIGHT: 63 IN | DIASTOLIC BLOOD PRESSURE: 70 MMHG | TEMPERATURE: 98 F | OXYGEN SATURATION: 99 % | BODY MASS INDEX: 28.88 KG/M2 | HEART RATE: 88 BPM | SYSTOLIC BLOOD PRESSURE: 128 MMHG | WEIGHT: 163 LBS

## 2024-11-25 DIAGNOSIS — E10.65 TYPE 1 DIABETES MELLITUS WITH HYPERGLYCEMIA (HCC): ICD-10-CM

## 2024-11-25 DIAGNOSIS — Z00.00 ROUTINE PHYSICAL EXAMINATION: Primary | ICD-10-CM

## 2024-11-25 DIAGNOSIS — K75.81 NASH (NONALCOHOLIC STEATOHEPATITIS): ICD-10-CM

## 2024-11-25 DIAGNOSIS — K80.80 BILIARY CALCULUS OF OTHER SITE WITHOUT OBSTRUCTION: ICD-10-CM

## 2024-11-25 DIAGNOSIS — M25.50 POLYARTHRALGIA: ICD-10-CM

## 2024-11-25 DIAGNOSIS — Z85.3 HISTORY OF BREAST CANCER: ICD-10-CM

## 2024-11-25 DIAGNOSIS — E10.69 HYPERLIPIDEMIA DUE TO TYPE 1 DIABETES MELLITUS (HCC): ICD-10-CM

## 2024-11-25 DIAGNOSIS — R79.89 ELEVATED SERUM CREATININE: ICD-10-CM

## 2024-11-25 DIAGNOSIS — E78.5 HYPERLIPIDEMIA DUE TO TYPE 1 DIABETES MELLITUS (HCC): ICD-10-CM

## 2024-11-25 LAB
ALBUMIN SERPL-MCNC: 4.1 G/DL (ref 3.2–4.8)
ALBUMIN SERPL-MCNC: 4.1 G/DL (ref 3.2–4.8)
ALBUMIN/GLOB SERPL: 1.9 {RATIO} (ref 1–2)
ALP LIVER SERPL-CCNC: 72 U/L
ALP LIVER SERPL-CCNC: 72 U/L
ALT SERPL-CCNC: 59 U/L
ALT SERPL-CCNC: 59 U/L
ANION GAP SERPL CALC-SCNC: 4 MMOL/L (ref 0–18)
AST SERPL-CCNC: 57 U/L (ref ?–34)
AST SERPL-CCNC: 57 U/L (ref ?–34)
BASOPHILS # BLD AUTO: 0.14 X10(3) UL (ref 0–0.2)
BASOPHILS NFR BLD AUTO: 2.2 %
BILIRUB DIRECT SERPL-MCNC: 0.2 MG/DL (ref ?–0.3)
BILIRUB SERPL-MCNC: 0.5 MG/DL (ref 0.3–1.2)
BILIRUB SERPL-MCNC: 0.5 MG/DL (ref 0.3–1.2)
BUN BLD-MCNC: 20 MG/DL (ref 9–23)
CALCIUM BLD-MCNC: 9.1 MG/DL (ref 8.7–10.4)
CHLORIDE SERPL-SCNC: 107 MMOL/L (ref 98–112)
CO2 SERPL-SCNC: 26 MMOL/L (ref 21–32)
CREAT BLD-MCNC: 1.5 MG/DL
CREAT UR-SCNC: 74.8 MG/DL
CRP SERPL-MCNC: <0.4 MG/DL (ref ?–0.5)
EGFRCR SERPLBLD CKD-EPI 2021: 43 ML/MIN/1.73M2 (ref 60–?)
EOSINOPHIL # BLD AUTO: 0.28 X10(3) UL (ref 0–0.7)
EOSINOPHIL NFR BLD AUTO: 4.4 %
ERYTHROCYTE [DISTWIDTH] IN BLOOD BY AUTOMATED COUNT: 12.4 %
ERYTHROCYTE [SEDIMENTATION RATE] IN BLOOD: 10 MM/HR
EST. AVERAGE GLUCOSE BLD GHB EST-MCNC: 194 MG/DL (ref 68–126)
GLOBULIN PLAS-MCNC: 2.2 G/DL (ref 2–3.5)
GLUCOSE BLD-MCNC: 208 MG/DL (ref 70–99)
HBA1C MFR BLD: 8.4 % (ref ?–5.7)
HCT VFR BLD AUTO: 36.1 %
HGB BLD-MCNC: 12 G/DL
IMM GRANULOCYTES # BLD AUTO: 0.01 X10(3) UL (ref 0–1)
IMM GRANULOCYTES NFR BLD: 0.2 %
LYMPHOCYTES # BLD AUTO: 2.09 X10(3) UL (ref 1–4)
LYMPHOCYTES NFR BLD AUTO: 32.9 %
MCH RBC QN AUTO: 31.3 PG (ref 26–34)
MCHC RBC AUTO-ENTMCNC: 33.2 G/DL (ref 31–37)
MCV RBC AUTO: 94.3 FL
MICROALBUMIN UR-MCNC: 1.5 MG/DL
MICROALBUMIN/CREAT 24H UR-RTO: 20.1 UG/MG (ref ?–30)
MONOCYTES # BLD AUTO: 0.48 X10(3) UL (ref 0.1–1)
MONOCYTES NFR BLD AUTO: 7.6 %
NEUTROPHILS # BLD AUTO: 3.35 X10 (3) UL (ref 1.5–7.7)
NEUTROPHILS # BLD AUTO: 3.35 X10(3) UL (ref 1.5–7.7)
NEUTROPHILS NFR BLD AUTO: 52.7 %
OSMOLALITY SERPL CALC.SUM OF ELEC: 293 MOSM/KG (ref 275–295)
PLATELET # BLD AUTO: 264 10(3)UL (ref 150–450)
POTASSIUM SERPL-SCNC: 4.4 MMOL/L (ref 3.5–5.1)
PROT SERPL-MCNC: 6.3 G/DL (ref 5.7–8.2)
PROT SERPL-MCNC: 6.3 G/DL (ref 5.7–8.2)
RBC # BLD AUTO: 3.83 X10(6)UL
SODIUM SERPL-SCNC: 137 MMOL/L (ref 136–145)
WBC # BLD AUTO: 6.4 X10(3) UL (ref 4–11)

## 2024-11-25 PROCEDURE — 36415 COLL VENOUS BLD VENIPUNCTURE: CPT

## 2024-11-25 PROCEDURE — 82570 ASSAY OF URINE CREATININE: CPT | Performed by: PHYSICIAN ASSISTANT

## 2024-11-25 PROCEDURE — 82043 UR ALBUMIN QUANTITATIVE: CPT | Performed by: PHYSICIAN ASSISTANT

## 2024-11-25 PROCEDURE — 85025 COMPLETE CBC W/AUTO DIFF WBC: CPT

## 2024-11-25 PROCEDURE — 86140 C-REACTIVE PROTEIN: CPT

## 2024-11-25 PROCEDURE — 85652 RBC SED RATE AUTOMATED: CPT

## 2024-11-25 PROCEDURE — 82248 BILIRUBIN DIRECT: CPT

## 2024-11-25 PROCEDURE — 83036 HEMOGLOBIN GLYCOSYLATED A1C: CPT

## 2024-11-25 PROCEDURE — 80053 COMPREHEN METABOLIC PANEL: CPT

## 2024-11-25 NOTE — PROGRESS NOTES
Wellness Exam    CC: Patient is presenting for a wellness exam    HPI:   Concerns: DM control has been ok, requesting signature on iLET insulin pump (fully automated). She has been working with an older version of this which does well but she can get highs since the pump is slower to adjust, new model will apparently adjust quicker. Denies frequent lows, she does have symptoms when these happen. Current A1c imporoved from 8.8% to 8.4% past 6 mos.    Diet is general, watches carbs closely, exercise: regular.    Gyne:     Health Maintenance   Topic Date Due    Pap Smear  03/11/2024         Denies pelvic pain, abnormal discharge or genital lesions.    Breast Cancer Screening:  s/p mastectomy b/l. She follows with breast surgeon for hx breast cancer.  Health Maintenance   Topic Date Due    Mammogram  Discontinued      Regular self breast exam: y.    Bone Health: Last DEXA: n/a.    Colon cancer screening:    Health Maintenance   Topic Date Due    Colorectal Cancer Screening  11/18/2025          Pertinent Family History:   Family History   Problem Relation Age of Onset    Diabetes Mother         Diabetes, blood pressure, thyroid    Colon Polyps Mother     Heart Disorder Father 61        MI x 2    Lipids Father     Colon Polyps Father     Other (Other) Father         Stroke    Stroke Father     Diabetes Brother         Stroke, Thyroid, Shingles    Stroke Brother 46    Diabetes Brother         Heart attack    Dementia Maternal Grandmother     Heart Attack Maternal Grandfather     Other (Other) Paternal Grandmother 69        brain tumor/cancer    Heart Attack Paternal Grandfather     Other (Other) Paternal Uncle         possible stomach cancer dx 70s    Cancer Maternal Great-Grandmother         dt gynecological cancer, possible ovarian cancer, dx >50y      Past Medical History:    Abdominal pain    Slight pains cone and go    Actinic keratosis    Arthritis    Back pain    Lower back    Benign neoplasm of transverse colon     Bloating    Feel bloated    Breast cancer (HCC)    Calculus of kidney    Diabetes mellitus (HCC)    Dizziness    Twice in 4-5 months felt faint    Fatigue    Working 12 hours a day    High cholesterol    IDDM (insulin dependent diabetes mellitus)    diagnosed 1999 - insulin pump    Night sweats    Periodically woth menopause    Other and unspecified hyperlipidemia    Pain in joints    Stress    Type 1 diabetes mellitus (HCC)    Visual impairment    reading glasses    Wears glasses    Reading glasses     Past Surgical History:   Procedure Laterality Date    Breast reconstruction Bilateral 07/13/2022    Bilateral immediate breast reconstruction with tissue expander and acellular dermal matrix. (Eileen)    Colonoscopy  11/2021    Colonoscopy  11/11/21    Repeat in a year.    Lumpectomy right  06/06/2022    Right breast wire localized lumpectomy with right breast specimen radiography. 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       Review of Systems   Constitutional: Negative for fever, chills and fatigue.   HENT: Negative for hearing loss, congestion, sore throat and neck pain.    Eyes: Negative for pain and visual  disturbance.   Respiratory: Negative for cough and shortness of breath.    Cardiovascular: Negative for chest pain and palpitations.   Gastrointestinal: Negative for nausea, vomiting, abdominal pain and diarrhea.   Genitourinary: Negative for urgency, frequency of urination, and abnormal vaginal bleeding.   Musculoskeletal: Negative for arthralgias and gait problem.   Skin: Negative for color change and rash.   Neurological: Negative for tremors, weakness and numbness.   Hematological: Negative for adenopathy. Does not bruise/bleed easily.   Psychiatric/Behavioral: Negative for confusion and agitation. The patient is not nervous/anxious.      /70   Pulse 88   Temp 98 °F (36.7 °C)   Resp 16   Ht 5' 3\" (1.6 m)   Wt 163 lb (73.9 kg)   SpO2 99%   BMI 28.87 kg/m²   Physical Exam   Constitutional: She is oriented to person, place, and time. She appears well-developed. No distress.    Head: Normocephalic and atraumatic.   Eyes: EOM are normal. Pupils are equal, round, and reactive to light. No scleral icterus.   ENT: TM's clear, nose normal, no oropharyngeal exudates or tonsillar hypertrophy    Neck: Normal range of motion. No thyromegaly present.   Cardiovascular: Normal rate, regular rhythm and normal heart sounds.  No murmur or friction rub heard.  Pulmonary/Chest: Effort normal and breath sounds normal bilaterally. She has no wheezes or rales.   Abdominal: Soft. Bowel sounds are normal. There is no tenderness. No HSM.  Musculoskeletal: Normal range of motion. She exhibits no edema.   Lymphadenopathy: She has no cervical, supraclavicular, or axillary adenopathy.   Neurological: She is alert and oriented to person, place, and time. DTRs are +2 and symmetric. Cranial nerves grossly intact.  Skin: Skin is warm. No rash noted. No erythema, pallor or jaundice.   Psychiatric: She has a normal mood and affect and her behavior is normal. Bilateral barefoot skin diabetic exam is normal, visualized feet and the  appearance is normal.  Bilateral monofilament/sensation of both feet is normal.  Pulsation pedal pulse exam of both lower legs/feet is normal as well.       Assessment and Plan:  Sherlyn Martinez is a 49 year old female here for a wellness exam.  Age appropriate cancer screening, labs, safety, immunizations were discussed with the patient and ordered as follows:  1. Routine physical examination (Primary)  2. Type 1 diabetes mellitus with hyperglycemia (HCC)  Agree with updating iLET model due to hyperglycemia, patient feels overall it is working well and she denies significant lows or other safety concerns with it.   Eye exam utd  Check urine micro  -     Microalb/Creat Ratio, Random Urine; Future; Expected date: 11/25/2024  -     Microalb/Creat Ratio, Random Urine  3. Hyperlipidemia due to type 1 diabetes mellitus (HCC)  Continue zetia, rosuvastatin   4. Polyarthralgia  With +LEN done by GI, check labs r/o RA  -     Cyclic Citrullinate Pep. IGG; Future; Expected date: 11/25/2024  -     Sed Rate, Westergren (Automated); Future; Expected date: 11/25/2024  -     C-Reactive Protein; Future; Expected date: 11/25/2024  -     Rheumatoid Arthritis Factor; Future; Expected date: 11/25/2024  5. Elevated serum creatinine  Up to 1.5 on current blood test, pt reports she was dehydrated. Increase water intake and recheck BP 2 wks.   -     Basic Metabolic Panel (8); Future; Expected date: 12/09/2024   6. History of breast cancer  S/p bilateral mastectomy, follows with breast surgeon, CARITO.    Reminded pt to schedule PAP with gyne    Discussed use of sunscreen, wearing seatbelt, recommend regular cardiovascular and weight bearing exercise as well as a well-rounded diet.    Return in about 3 months (around 2/25/2025) for med check, follow-up.     Patient/Caregiver Education:  Patient/Caregiver Education: There are no barriers to learning. Medical education done.  Outcome: Patient verbalizes understanding.       Educated by: STUART          [1]   Current Outpatient Medications on File Prior to Visit   Medication Sig Dispense Refill    RAMIPRIL 2.5 MG Oral Cap TAKE 1 CAPSULE BY MOUTH AT BEDTIME TO PROTECT KIDNEYS 90 capsule 0    insulin lispro (HUMALOG) 100 UNIT/ML Injection Solution Inject 70 Units into the skin daily. 70 mL 0    Ascorbic Acid (VITAMIN C OR) Take by mouth.      ZETIA 10 MG Oral Tab Zetia 10 mg tablet, [RxNorm: 446377]      rosuvastatin 40 MG Oral Tab Take 1 tablet (40 mg total) by mouth every evening.      Glucose Blood (ACCU-CHEK GUIDE) In Vitro Strip 1 Lancet by Finger stick route 4 (four) times daily. 400 strip 3    Continuous Blood Gluc Transmit (GUARDIAN LINK 3 TRANSMITTER) Does not apply Misc       Insulin Infusion Pump (MINIMED 770G INSULIN PUMP SYS) Does not apply Kit Basal rate:  12-3:30 AM: 0.7 units  3:30-7:30 PM: 0.9 units  7:30 PM-12:00 AM: 0.7 units    ICR: 10:1  CF/Sensitivity: 1:40  Target Glucose:       Cholecalciferol (VITAMIN D) 50 MCG (2000 UT) Oral Cap Take 50 capsules (100,000 Units total) by mouth daily.       No current facility-administered medications on file prior to visit.

## 2024-11-25 NOTE — PATIENT INSTRUCTIONS
Schedule with gynecology for PAP    In 2 weeks: have blood drawn, no fasting needed    In 3 months: before next visit, Have labs drawn, fasting 8-10 hours prior (water before is ok).

## 2024-11-29 DIAGNOSIS — E10.9 TYPE 1 DIABETES MELLITUS WITHOUT COMPLICATION (HCC): ICD-10-CM

## 2024-11-29 RX ORDER — INSULIN LISPRO 100 [IU]/ML
70 INJECTION, SOLUTION INTRAVENOUS; SUBCUTANEOUS DAILY
Qty: 70 ML | Refills: 0 | Status: SHIPPED | OUTPATIENT
Start: 2024-11-29

## 2024-11-29 NOTE — TELEPHONE ENCOUNTER
Last time medication was refilled: 8/27/24  Next office visit due/scheduled: 3/18/25  Last office visit: 11/25/24  Last Labs: 11/25/24

## 2024-12-31 PROBLEM — R74.8 ELEVATED LIVER ENZYMES: Status: ACTIVE | Noted: 2024-03-12

## 2024-12-31 PROBLEM — I25.10 CALCIFICATION OF CORONARY ARTERY: Status: ACTIVE | Noted: 2023-10-02

## 2025-02-15 DIAGNOSIS — E10.29 MICROALBUMINURIA DUE TO TYPE 1 DIABETES MELLITUS (HCC): ICD-10-CM

## 2025-02-15 DIAGNOSIS — R80.9 MICROALBUMINURIA DUE TO TYPE 1 DIABETES MELLITUS (HCC): ICD-10-CM

## 2025-02-15 DIAGNOSIS — E10.9 TYPE 1 DIABETES MELLITUS WITHOUT COMPLICATION (HCC): ICD-10-CM

## 2025-02-15 RX ORDER — INSULIN LISPRO 100 [IU]/ML
70 INJECTION, SOLUTION INTRAVENOUS; SUBCUTANEOUS DAILY
Qty: 70 ML | Refills: 0 | Status: SHIPPED | OUTPATIENT
Start: 2025-02-15

## 2025-02-15 RX ORDER — RAMIPRIL 2.5 MG/1
CAPSULE ORAL
Qty: 90 CAPSULE | Refills: 0 | Status: SHIPPED | OUTPATIENT
Start: 2025-02-15

## 2025-02-15 NOTE — TELEPHONE ENCOUNTER
Last time medication was refilled: 11/29/24  Next office visit due/scheduled: 3/18/25  Last office visit: 11/25/24  Last Labs: 11/25/24

## 2025-02-15 NOTE — TELEPHONE ENCOUNTER
Last time medication was refilled: 11/21/24  Next office visit due/scheduled: 3/18/25  Last office visit: 11/25/24  Last Labs: 11/25/24

## 2025-03-04 ENCOUNTER — TELEPHONE (OUTPATIENT)
Dept: INTERNAL MEDICINE CLINIC | Facility: CLINIC | Age: 50
End: 2025-03-04

## 2025-03-05 NOTE — TELEPHONE ENCOUNTER
Diabetic Supplies    Glucose test 4x per day    Transmitter and related supplies (batteries)    Guardian 4 Sensor     Form in provider bin for signature

## 2025-03-13 ENCOUNTER — LAB ENCOUNTER (OUTPATIENT)
Dept: LAB | Age: 50
End: 2025-03-13
Attending: PHYSICIAN ASSISTANT
Payer: COMMERCIAL

## 2025-03-13 DIAGNOSIS — M25.50 POLYARTHRALGIA: ICD-10-CM

## 2025-03-13 DIAGNOSIS — R79.89 ELEVATED SERUM CREATININE: ICD-10-CM

## 2025-03-13 LAB
ANION GAP SERPL CALC-SCNC: 8 MMOL/L (ref 0–18)
BUN BLD-MCNC: 16 MG/DL (ref 9–23)
CALCIUM BLD-MCNC: 10 MG/DL (ref 8.7–10.6)
CHLORIDE SERPL-SCNC: 103 MMOL/L (ref 98–112)
CO2 SERPL-SCNC: 28 MMOL/L (ref 21–32)
CREAT BLD-MCNC: 0.97 MG/DL
EGFRCR SERPLBLD CKD-EPI 2021: 72 ML/MIN/1.73M2 (ref 60–?)
FASTING STATUS PATIENT QL REPORTED: YES
GLUCOSE BLD-MCNC: 139 MG/DL (ref 70–99)
OSMOLALITY SERPL CALC.SUM OF ELEC: 291 MOSM/KG (ref 275–295)
POTASSIUM SERPL-SCNC: 4.1 MMOL/L (ref 3.5–5.1)
RHEUMATOID FACT SERPL-ACNC: <3.5 IU/ML (ref ?–14)
SODIUM SERPL-SCNC: 139 MMOL/L (ref 136–145)

## 2025-03-13 PROCEDURE — 86431 RHEUMATOID FACTOR QUANT: CPT

## 2025-03-13 PROCEDURE — 86200 CCP ANTIBODY: CPT

## 2025-03-13 PROCEDURE — 36415 COLL VENOUS BLD VENIPUNCTURE: CPT

## 2025-03-13 PROCEDURE — 80048 BASIC METABOLIC PNL TOTAL CA: CPT

## 2025-03-14 LAB — CCP IGG SERPL-ACNC: 1.1 U/ML (ref 0–6.9)

## 2025-03-18 ENCOUNTER — OFFICE VISIT (OUTPATIENT)
Dept: INTERNAL MEDICINE CLINIC | Facility: CLINIC | Age: 50
End: 2025-03-18
Payer: COMMERCIAL

## 2025-03-18 VITALS
WEIGHT: 154 LBS | TEMPERATURE: 98 F | DIASTOLIC BLOOD PRESSURE: 70 MMHG | RESPIRATION RATE: 16 BRPM | HEART RATE: 103 BPM | SYSTOLIC BLOOD PRESSURE: 122 MMHG | BODY MASS INDEX: 27.29 KG/M2 | HEIGHT: 63 IN | OXYGEN SATURATION: 98 %

## 2025-03-18 DIAGNOSIS — E03.8 SUBCLINICAL HYPOTHYROIDISM: ICD-10-CM

## 2025-03-18 DIAGNOSIS — E78.5 HYPERLIPIDEMIA DUE TO TYPE 1 DIABETES MELLITUS (HCC): ICD-10-CM

## 2025-03-18 DIAGNOSIS — E10.69 HYPERLIPIDEMIA DUE TO TYPE 1 DIABETES MELLITUS (HCC): ICD-10-CM

## 2025-03-18 DIAGNOSIS — K75.81 NASH (NONALCOHOLIC STEATOHEPATITIS): ICD-10-CM

## 2025-03-18 DIAGNOSIS — I25.10 CALCIFICATION OF CORONARY ARTERY: ICD-10-CM

## 2025-03-18 DIAGNOSIS — E10.8: Primary | ICD-10-CM

## 2025-03-18 PROBLEM — Z86.0101 HISTORY OF ADENOMATOUS POLYP OF COLON: Status: RESOLVED | Noted: 2022-11-18 | Resolved: 2025-03-18

## 2025-03-18 PROBLEM — R74.8 ELEVATED LIVER ENZYMES: Status: RESOLVED | Noted: 2024-03-12 | Resolved: 2025-03-18

## 2025-03-18 PROCEDURE — 99214 OFFICE O/P EST MOD 30 MIN: CPT | Performed by: PHYSICIAN ASSISTANT

## 2025-03-18 NOTE — PROGRESS NOTES
HPI:   Sherlyn Martinez is a 49 year old female who presents for recheck of her diabetes.   Patient’s FBS have been controlled, GMI down to 7.3%. has lost > 10 lbs intentionally through diet and exercise, following with GI for OSUNA. Feels well.  Last visit with ophthalmologist was 10/2024.    Pt has been checking her feet on a regular basis. Pt denies any tingling of the feet.   Pt denies any issues with depression.   Pt complains of right thumb pain, some locking..  Exercise:  regular .  Diet: watches sugar closely    Wt Readings from Last 6 Encounters:   03/18/25 154 lb (69.9 kg)   12/31/24 165 lb (74.8 kg)   11/25/24 163 lb (73.9 kg)   05/03/24 165 lb 3.2 oz (74.9 kg)   04/02/24 160 lb (72.6 kg)   02/27/24 166 lb 6.4 oz (75.5 kg)     Body mass index is 27.28 kg/m².     No components found for: \"HGBA1C\"  Hemoglobin A1c (%)   Date Value   01/30/2015 11.0 (H)     HgbA1C (%)   Date Value   11/25/2024 8.4 (H)   04/02/2024 8.8 (H)   09/06/2023 8.0 (H)     Cholesterol, Total (mg/dL)   Date Value   09/06/2023 162   04/10/2023 201 (H)   05/13/2022 196   01/30/2015 227 (H)     HDL Cholesterol (mg/dL)   Date Value   09/06/2023 74 (H)   04/10/2023 89 (H)   05/13/2022 70 (H)   01/30/2015 82     LDL Cholesterol Calc (mg/dL)   Date Value   01/30/2015 126 (H)     LDL Cholesterol (mg/dL)   Date Value   09/06/2023 74   04/10/2023 103 (H)   05/13/2022 112 (H)     Triglycerides (mg/dL)   Date Value   01/30/2015 95     AST (SGOT) (IU/L)   Date Value   01/30/2015 15     AST (U/L)   Date Value   11/25/2024 57 (H)   11/25/2024 57 (H)   04/02/2024 30     ALT (SGPT) (IU/L)   Date Value   01/30/2015 15     ALT (U/L)   Date Value   11/25/2024 59 (H)   11/25/2024 59 (H)   04/02/2024 50      MICROALB/CREAT RATIO   Date Value Ref Range Status   02/18/2016 13.7 0.0 - 30.0 mg/g creat Final     Malb/Cre Calc   Date Value Ref Range Status   11/25/2024 20.1 <=30.0 ug/mg Final     Comment:     <30 ug/mg creatinine       Normal     ug/mg  creatinine   Microalbuminuria   >300 ug/mg creatinine      Albuminuria       09/06/2023 20.5 <=30.0 ug/mg Final     Comment:     <30 ug/mg creatinine       Normal     ug/mg creatinine   Microalbuminuria   >300 ug/mg creatinine      Albuminuria       05/13/2022 87.3 (H) <=30.0 ug/mg Final     Comment:     <30 ug/mg creatinine       Normal     ug/mg creatinine   Microalbuminuria   >300 ug/mg creatinine      Albuminuria           Current Outpatient Medications   Medication Sig Dispense Refill    RAMIPRIL 2.5 MG Oral Cap TAKE 1 CAPSULE BY MOUTH AT BEDTIME TO PROTECT KIDNEYS 90 capsule 0    HUMALOG 100 UNIT/ML Injection Solution INJECT 70 UNITS INTO THE SKIN DAILY. 70 mL 0    Ascorbic Acid (VITAMIN C OR) Take by mouth.      ZETIA 10 MG Oral Tab Zetia 10 mg tablet, [RxNorm: 610486]      rosuvastatin 40 MG Oral Tab Take 1 tablet (40 mg total) by mouth every evening.      Glucose Blood (ACCU-CHEK GUIDE) In Vitro Strip 1 Lancet by Finger stick route 4 (four) times daily. 400 strip 3    Continuous Blood Gluc Transmit (GUARDIAN LINK 3 TRANSMITTER) Does not apply Misc       Insulin Infusion Pump (MINIMED 770G INSULIN PUMP SYS) Does not apply Kit Basal rate:  12-3:30 AM: 0.7 units  3:30-7:30 PM: 0.9 units  7:30 PM-12:00 AM: 0.7 units    ICR: 10:1  CF/Sensitivity: 1:40  Target Glucose:       Cholecalciferol (VITAMIN D) 50 MCG (2000 UT) Oral Cap Take 50 capsules (100,000 Units total) by mouth daily.        Past Medical History:    Abdominal pain    Slight pains cone and go    Actinic keratosis    Arthritis    Back pain    Lower back    Benign neoplasm of transverse colon    Bloating    Feel bloated    Breast cancer (HCC)    Calculus of kidney    Diabetes mellitus (HCC)    Dizziness    Twice in 4-5 months felt faint    Fatigue    Working 12 hours a day    High cholesterol    IDDM (insulin dependent diabetes mellitus)    diagnosed 1999 - insulin pump    Night sweats    Periodically woth menopause    Other and  unspecified hyperlipidemia    Pain in joints    Stress    Type 1 diabetes mellitus (HCC)    Visual impairment    reading glasses    Wears glasses    Reading glasses      Past Surgical History:   Procedure Laterality Date    Breast reconstruction Bilateral 07/13/2022    Bilateral immediate breast reconstruction with tissue expander and acellular dermal matrix. (Eileen)    Colonoscopy  11/2021    Colonoscopy  11/11/21    Repeat in a year.    Lumpectomy right  06/06/2022    Right breast wire localized lumpectomy with right breast specimen radiography. 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 [employer]   Tobacco Use    Smoking status: Never    Smokeless tobacco: Never   Vaping Use    Vaping status: Never Used   Substance and Sexual Activity    Alcohol use: Yes     Comment: 1 per month    Drug use: No    Sexual activity: Never   Other Topics Concern    Caffeine Concern Yes     Comment: 2-3 times a day coffee     Exercise Yes     Comment: 2 times a week    Seat Belt Yes          REVIEW OF SYSTEMS:   GENERAL HEALTH: feels well otherwise  SKIN: denies any unusual skin lesions or rashes  RESPIRATORY: denies shortness of breath with exertion  CARDIOVASCULAR: denies chest pain on exertion  GI: denies abdominal pain and denies heartburn  NEURO: denies headaches    EXAM:   /70   Pulse 103   Temp 98.4 °F (36.9 °C)   Resp 16   Ht 5' 3\" (1.6 m)   Wt 154 lb (69.9 kg)   SpO2 98%   BMI 27.28 kg/m²   GENERAL: well developed, well nourished,in no apparent distress  SKIN: no rashes,no suspicious lesions  NECK: supple,no adenopathy,no bruits  LUNGS: clear to  auscultation  CARDIO: RRR without murmur  GI: good BS's,no masses, HSM or tenderness  EXTREMITIES: no cyanosis, clubbing or edema  NEURO: alert and oriented   Bilateral barefoot skin diabetic exam is normal, visualized feet and the appearance is normal.  Bilateral monofilament/sensation of both feet is normal.  Pulsation pedal pulse exam of both lower legs/feet is normal as well.           ASSESSMENT AND PLAN:   Sherlyn Martinez is a 49 year old female who presents for a recheck of her diabetes.   Diabetic control is improved.  1. Type 1 diabetes mellitus with complications (HCC) (Primary)  Doing well with current monitoring system and automatic insulin pump. Continue current med therapy. Check labs. Eye exam due in October, pt aware  -     Hemoglobin A1C; Future; Expected date: 03/18/2025  -     Microalb/Creat Ratio, Random Urine; Future; Expected date: 03/18/2025  -     Urinalysis, Routine; Future; Expected date: 03/18/2025  2. Hyperlipidemia due to type 1 diabetes mellitus (HCC)  At goal continue statin   -     Lipid Panel; Future; Expected date: 03/18/2025  3. Calcification of coronary artery  Continue control of risk factors; has routine f/up with cardiology next month  4. Subclinical hypothyroidism  Asymptomatic, check labs to monitor  -     TSH W Reflex To Free T4; Future; Expected date: 03/18/2025  5. OSUNA (nonalcoholic steatohepatitis)  Has made lifestyle improvements and lost weight per GI recommendations. F/up with GI as planned over the summer.     Pt will schedule PAP with gynecology   She will check with insurance about shingrix vaccines  The patient indicates understanding of these issues and agrees to the plan.  Return in about 8 months (around 11/26/2025) for routine physical, or sooner as needed.

## 2025-05-06 ENCOUNTER — OFFICE VISIT (OUTPATIENT)
Dept: SURGERY | Facility: CLINIC | Age: 50
End: 2025-05-06
Payer: COMMERCIAL

## 2025-05-06 VITALS
OXYGEN SATURATION: 98 % | BODY MASS INDEX: 27 KG/M2 | DIASTOLIC BLOOD PRESSURE: 72 MMHG | SYSTOLIC BLOOD PRESSURE: 112 MMHG | RESPIRATION RATE: 18 BRPM | HEART RATE: 108 BPM | WEIGHT: 153 LBS

## 2025-05-06 DIAGNOSIS — C50.919: Primary | ICD-10-CM

## 2025-05-06 DIAGNOSIS — Z90.13 ABSENCE OF BOTH BREASTS: ICD-10-CM

## 2025-05-06 PROCEDURE — 99213 OFFICE O/P EST LOW 20 MIN: CPT | Performed by: SURGERY

## 2025-05-07 ENCOUNTER — OFFICE VISIT (OUTPATIENT)
Dept: OBGYN CLINIC | Facility: CLINIC | Age: 50
End: 2025-05-07
Payer: COMMERCIAL

## 2025-05-07 VITALS
BODY MASS INDEX: 27 KG/M2 | HEART RATE: 71 BPM | WEIGHT: 152 LBS | DIASTOLIC BLOOD PRESSURE: 52 MMHG | SYSTOLIC BLOOD PRESSURE: 114 MMHG

## 2025-05-07 DIAGNOSIS — Z85.3 PERSONAL HISTORY OF BREAST CANCER: ICD-10-CM

## 2025-05-07 DIAGNOSIS — Z01.419 WELL WOMAN EXAM WITH ROUTINE GYNECOLOGICAL EXAM: Primary | ICD-10-CM

## 2025-05-07 DIAGNOSIS — Z12.4 CERVICAL CANCER SCREENING: ICD-10-CM

## 2025-05-07 PROCEDURE — 87624 HPV HI-RISK TYP POOLED RSLT: CPT | Performed by: NURSE PRACTITIONER

## 2025-05-07 PROCEDURE — 99386 PREV VISIT NEW AGE 40-64: CPT | Performed by: NURSE PRACTITIONER

## 2025-05-07 PROCEDURE — 88175 CYTOPATH C/V AUTO FLUID REDO: CPT | Performed by: NURSE PRACTITIONER

## 2025-05-07 NOTE — PROGRESS NOTES
Here for new gynecology visit.  49 year old G 0 P 0.  No LMP recorded. (Menstrual status: Menopause)..     Here for Annual Gynecologic Exam.     Menses have been absent for years. Her last menses was shortly after her mastectomy in . She does have intermittent hot flashes, she feels she is managing them at this time.    She was diagnosed with breast cancer in . She is being followed by surgical oncology. She had her last visit yesterday.    Last pap smear was in  and it was normal.  No hx of abnormal pap smears.     OB Hx:  neg.    Family gyn hx:  neg.   Family breast hx:  neg.  Last mammogram in .  Screening labs with PCP routinely.  Colonoscopy up to date.    Past Medical History[1]    Past Surgical History[2]    Medications Ordered Prior to Encounter[3]    OB History    Para Term  AB Living   0 0 0 0 0 0   SAB IAB Ectopic Multiple Live Births   0 0 0 0 0       ROS:    General:  No wt loss, wt gain, appetite changes.  Breasts:  No pain, lumps or secretions.  :   No urgency, frequency, DANIELLE, bladder problems in past.    /52   Pulse 71   Wt 152 lb (68.9 kg)   BMI 26.93 kg/m²     NECK:  Thyroid normal size without lesions. No adenopathy.  LUNGS:  Clear to auscultation.  COR;  Regular rate and rhythm.    BREASTS:  deferred  ABDOMEN:  Soft and non tender.  No organomegaly.  No inguinal adenopathy.  VULVA:  No lesions or erythema.  VAGINA:  atrophic without lesions or abnormal discharge.  CERVIX: unable to visualize.  Pap smear done with HPV.  Bimanual exam deferred, patient unable to tolerate any further exam    IMP/PLAN:    1. Well woman exam with routine gynecological exam  Advised increase calcium intake and weight bearing exercises    2. Cervical cancer screening  If insufficient discussed vaginal hyaluronic acids and dilators   - ThinPrep PAP with HPV Reflex Request; Future    3. Personal history of breast cancer  Continue routine care with Surgical Oncology    See in 1  year/ prn.  `          [1]   Past Medical History:   Abdominal pain    Slight pains cone and go    Actinic keratosis    Anxiety    Arthritis    Back pain    Lower back    Benign neoplasm of transverse colon    Bloating    Feel bloated    Breast cancer (HCC)    Calculus of kidney    Cancer (HCC)    Stage 1 Invasive Breast Cancer.  Bilateral Masectomy on 7/13    Diabetes mellitus (HCC)    Dizziness    Twice in 4-5 months felt faint    Fatigue    Working 12 hours a day    High cholesterol    IDDM (insulin dependent diabetes mellitus)    diagnosed 1999 - insulin pump    Night sweats    Periodically woth menopause    Other and unspecified hyperlipidemia    Pain in joints    Stress    Type 1 diabetes mellitus (HCC)    Visual impairment    reading glasses    Wears glasses    Reading glasses   [2]   Past Surgical History:  Procedure Laterality Date    Breast reconstruction Bilateral 07/13/2022    Bilateral immediate breast reconstruction with tissue expander and acellular dermal matrix. (Eileen)    Colonoscopy  11/2021    Colonoscopy  11/11/21    Repeat in a year.    Lumpectomy right  06/06/2022    Right breast wire localized lumpectomy with right breast specimen radiography. ([facility])    Mastectomy modified radical Bilateral 07/13/2022    Bilateral breast nipple versus skin sparing mastectomies, right lymphoscintigraphy, right sentinel lymph node biopsy (JONO)     Other surgical history      [place] teeth   [3]   Current Outpatient Medications on File Prior to Visit   Medication Sig Dispense Refill    Hydroquinone 4 % External Cream Apply to affected areas daily for three months then discontinue 28.35 g 2    RAMIPRIL 2.5 MG Oral Cap TAKE 1 CAPSULE BY MOUTH AT BEDTIME TO PROTECT KIDNEYS 90 capsule 0    HUMALOG 100 UNIT/ML Injection Solution INJECT 70 UNITS INTO THE SKIN DAILY. 70 mL 0    ZETIA 10 MG Oral Tab       rosuvastatin 40 MG Oral Tab Take 1 tablet (40 mg total) by mouth every evening.      Glucose Blood (ACCU-CHEK  GUIDE) In Vitro Strip 1 Lancet by Finger stick route 4 (four) times daily. 400 strip 3    Continuous Blood Gluc Transmit (GUARDIAN LINK 3 TRANSMITTER) Does not apply Misc       Insulin Infusion Pump (MINIMED 770G INSULIN PUMP SYS) Does not apply Kit Basal rate:  12-3:30 AM: 0.7 units  3:30-7:30 PM: 0.9 units  7:30 PM-12:00 AM: 0.7 units    ICR: 10:1  CF/Sensitivity: 1:40  Target Glucose:       Cholecalciferol (VITAMIN D) 50 MCG (2000 UT) Oral Cap Take 50 capsules (100,000 Units total) by mouth in the morning.       No current facility-administered medications on file prior to visit.

## 2025-05-09 LAB — HPV E6+E7 MRNA CVX QL NAA+PROBE: NEGATIVE

## 2025-05-10 DIAGNOSIS — E10.29 MICROALBUMINURIA DUE TO TYPE 1 DIABETES MELLITUS (HCC): ICD-10-CM

## 2025-05-10 DIAGNOSIS — R80.9 MICROALBUMINURIA DUE TO TYPE 1 DIABETES MELLITUS (HCC): ICD-10-CM

## 2025-05-10 RX ORDER — RAMIPRIL 2.5 MG/1
CAPSULE ORAL
Qty: 90 CAPSULE | Refills: 0 | Status: SHIPPED | OUTPATIENT
Start: 2025-05-10

## 2025-05-10 NOTE — TELEPHONE ENCOUNTER
Last time medication was refilled 2/15/25  Last office visit  3/18/25  Next office visit due/scheduled   Future Appointments   Date Time Provider Department Center   6/13/2025  9:00 AM Tarun Arellano MD [facility]   7/1/2025  8:00 AM Marco A Gramajo MD [facility] GI   12/2/2025  9:00 AM Tila Hu PA-C [facility] [address]   4/2/2026  6:45 AM Ashutosh Yeboah MD [facility]   5/12/2026  8:15 AM Mary De La Cruz MD [facility]     Passed protocol, Medication sent.

## 2025-05-12 NOTE — PROGRESS NOTES
Breast Surgery Surveillance Visit    Diagnosis: Abnormal mammogram with discordant benign results s/p right breast excisional biopsy on 6/6/2022 with confirmation of microinvasive DCIS now status post bilateral mastectomies with right sentinel lymph node biopsy and reconstruction.    Stage: T1 MI N0Mx    Disease Status:  Surgical treatment complete, no further treatment recommended.    History of Present Illness:   Ms. Sherlyn Martinez is a 49 year old woman who presents with a septated palpable mass of the right breast.  The patient reports she felt this in March 2022.  It has not increased in size or symptomatology since this discovery.  She has no personal prior history of breast disease or biopsies and no known family history of breast cancer.  Her last screening mammogram in June 2021 was unremarkable.  Secondary to the new palpable finding she was referred for right diagnostic evaluation on April 26, 2022 that showed heterogeneously dense breast tissue with a well-circumscribed hypoechoic nodule measuring up to 1.1 cm at the 9 o'clock position with unremarkable right axillary lymph nodes.  Biopsy of the right breast mass was recommended took place on April 27, 2022.  This confirmed infarcted tissue with a reactive fibroinflammatory reaction which could not be distinguished from a necrotic neoplasm because of its pattern and surgical excision was recommended.  This took place without complication.  She was upgraded to Microinvasive breast cancer. She underwent bilateral mastectomy with right SLNB, which occurred without complication.  She underwent her implant exchange in December 2022 without complication and has no new clinical concerns bilaterally.  She is here today for evaluation and recommendations for further therapy.        Past Medical History:    Abdominal pain    Slight pains cone and go    Actinic keratosis    Anxiety    Arthritis    Back pain    Lower back    Benign neoplasm of transverse colon     Bloating    Feel bloated    Breast cancer (HCC)    Calculus of kidney    Cancer (HCC)    Stage 1 Invasive Breast Cancer.  Bilateral Masectomy on 7/13    Diabetes mellitus (HCC)    Dizziness    Twice in 4-5 months felt faint    Fatigue    Working 12 hours a day    High cholesterol    IDDM (insulin dependent diabetes mellitus)    diagnosed 1999 - insulin pump    Night sweats    Periodically woth menopause    Other and unspecified hyperlipidemia    Pain in joints    Stress    Type 1 diabetes mellitus (HCC)    Visual impairment    reading glasses    Wears glasses    Reading glasses       Past Surgical History:   Procedure Laterality Date    Breast reconstruction Bilateral 07/13/2022    Bilateral immediate breast reconstruction with tissue expander and acellular dermal matrix. (Eileen)    Colonoscopy  11/2021    Colonoscopy  11/11/21    Repeat in a year.    Lumpectomy right  06/06/2022    Right breast wire localized lumpectomy with right breast specimen radiography. ([facility])    Mastectomy modified radical Bilateral 07/13/2022    Bilateral breast nipple versus skin sparing mastectomies, right lymphoscintigraphy, right sentinel lymph node biopsy ([facility]OGESIK)     Other surgical history      [place] teeth       Gynecological History:  Pt is nulliparous.  She achieved menarche at age 16  She denies any history of oral contraceptive use.  She denies infertility treatment to achieve pregnancy.    Medications:     Hydroquinone 4 % External Cream Apply to affected areas daily for three months then discontinue 28.35 g 2    [DISCONTINUED] RAMIPRIL 2.5 MG Oral Cap TAKE 1 CAPSULE BY MOUTH AT BEDTIME TO PROTECT KIDNEYS 90 capsule 0    HUMALOG 100 UNIT/ML Injection Solution INJECT 70 UNITS INTO THE SKIN DAILY. 70 mL 0    ZETIA 10 MG Oral Tab       rosuvastatin 40 MG Oral Tab Take 1 tablet (40 mg total) by mouth every evening.      Glucose Blood (ACCU-CHEK GUIDE) In Vitro Strip 1 Lancet by Finger stick route 4 (four) times daily. 400 strip 3     Continuous Blood Gluc Transmit (GUARDIAN LINK 3 TRANSMITTER) Does not apply Misc       Insulin Infusion Pump (MINIMED 770G INSULIN PUMP SYS) Does not apply Kit Basal rate:  12-3:30 AM: 0.7 units  3:30-7:30 PM: 0.9 units  7:30 PM-12:00 AM: 0.7 units    ICR: 10:1  CF/Sensitivity: 1:40  Target Glucose:       Cholecalciferol (VITAMIN D) 50 MCG (2000 UT) Oral Cap Take 50 capsules (100,000 Units total) by mouth in the morning.         Allergies:    No Known Allergies    Family History:   Family History   Problem Relation Age of Onset    Diabetes Mother         Diabetes, blood pressure, thyroid    Colon Polyps Mother     Heart Disorder Father 61        Stroke at 85 yrs old.  Heaert attack at 66, 74 yrs old open heart surgery    Lipids Father     Colon Polyps Father     Other (Other) Father         Stroke    Stroke Father     Diabetes Brother         Stroke, Thyroid, Shingles    Stroke Brother 46    Diabetes Brother         Heart attack    Dementia Maternal Grandmother     Heart Attack Maternal Grandfather     Other (Other) Paternal Grandmother 69        brain tumor/cancer    Heart Attack Paternal Grandfather     Other (Other) Paternal Uncle         possible stomach cancer dx 70s    Cancer Maternal Great-Grandmother         dt gynecological cancer, possible ovarian cancer, dx >50y    Diabetes Brother         Heart attack       She is not of Ashkenazi Christianity ancestry.    Social History:  History   Alcohol Use    Yes     Comment: 1 per month       History   Smoking Status    Never   Smokeless Tobacco    Never   The patient is single.  She has no children.  She is employed full-time.    Review of Systems:  General:   The patient denies, fever, chills, night sweats, fatigue, generalized weakness, change in appetite or weight loss.    HEENT:     The patient denies eye irritation, cataracts, redness, glaucoma, yellowing of the eyes, change in vision or color blindness. The patient denies hearing loss, ringing in the  ears, ear drainage, earaches, nasal congestion, nose bleeds, snoring, pain in mouth/throat, hoarseness, change in voice, facial trauma.    Respiratory:  The patient denies chronic cough, phlegm, hemoptysis, pleurisy/chest pain, pneumonia, asthma, wheezing, difficulty in breathing with exertion, emphysema, chronic bronchitis, shortness of breath or abnormal sound when breathing.     Cardiovascular:  There is no history of chest pain, chest pressure/discomfort, palpitations, irregular heartbeat, fainting or near-fainting, difficulty breathing when lying flat, SOB/Coughing at night, swelling of the legs or chest pain while walking.    Breasts:  See history of present illness    Gastrointestinal:     There is no history of difficulty or pain with swallowing, reflux symptoms, vomiting, dark or bloody stools, constipation, yellowing of the skin, indigestion, nausea, change in bowel habits, diarrhea, abdominal pain or vomiting blood.     Genitourinary:  The patient denies frequent urination, needing to get up at night to urinate, urinary hesitancy or retaining urine, painful urination, urinary incontinence, decreased urine stream, blood in the urine or vaginal/penile discharge.    Skin:    The patient denies rash, itching, skin lesions, dry skin, change in skin color or change in moles.     Hematologic/Lymphatic:  The patient denies easily bruising or bleeding or persistent swollen glands or lymph nodes.     Musculoskeletal:  The patient denies muscle aches/pain, joint pain, stiff joints, neck pain, back pain or bone pain.    Neuropsychiatric:  There is no history of migraines or severe headaches, seizure/epilepsy, speech problems, coordination problems, trembling/tremors, fainting/black outs, dizziness, memory problems, loss of sensation/numbness, problems walking, weakness, tingling or burning in hands/feet. There is no history of abusive relationship, bipolar disorder, sleep disturbance, anxiety, depression or feeling  of despair.    Endocrine:    There is no history of poor/slow wound healing, weight loss/gain, fertility or hormone problems, cold intolerance, thyroid disease.     Allergic/Immunologic:  There is no history of hives, hay fever, angioedema or anaphylaxis.    /72 (BP Location: Left arm, Patient Position: Sitting, Cuff Size: adult)   Pulse 108   Resp 18   Wt 69.4 kg (153 lb)   SpO2 98%   BMI 27.10 kg/m²     Physical Exam:  The patient is an alert, oriented, well-nourished and  well-developed woman who appears her stated age. Her speech patterns and movements are normal. Her affect is appropriate.    HEENT: The head is normocephalic. The neck is supple. The thyroid is not enlarged and is without palpable masses/nodules. There are no palpable masses. The trachea is in the midline. Conjunctiva are clear, non-icteric.    Chest: The chest expands symmetrically. The lungs are clear to auscultation.    Heart: The rhythm is regular.  There are no murmurs, rubs, gallops or thrills.    Breasts:  Her breasts are surgically absent.  Skin flaps are well perfused.  There are no palpable chest wall nodules, skin changes and/or axillary adenopathy appreciated bilaterally.    Abdomen:  The abdomen is soft, flat and non tender. The liver is not enlarged. There are no palpable masses.    Lymph Nodes:  The supraclavicular, axillary and cervical regions are free of significant lymphadenopathy.    Back: There is no vertebral column tenderness.    Skin: The skin appears normal. There are no suspicious appearing rashes or lesions.    Extremities: The extremities are without deformity, cyanosis or edema.    Impression: 49-year-old female status post wide local excision extensive microinvasive ductal carcinoma in situ and family history of breast cancer, s/p bilateral mastectomy.    Recommendations:   I had a discussion with the Patient regarding her breast exam.  She is healing well since surgery with no signs of new or recurrent  disease. I personally reviewed her pathology.  She had no residual disease in the breast.  Her case was presented at multidisciplinary tumor board given the small initial microinvasive concern in the setting of bilateral mastectomy no further systemic treatment was recommended.  She will need no further mammograms status post bilateral mastectomies.   She will see me in 12 months for her next clinical exam.  I encouraged her to continue monitoring her ROM and strength and explained that a referral to physical therapy may be warranted in the future if she identifies any limitations or restrictions. She was given ample opportunity for questions and those questions were answered to her satisfaction. She was encouraged to contact the office with any questions or concerns prior to her next scheduled appointment.     This encounter lasted a total of 25 minutes, more than 50% of which was dedicated to the discussion of management options.

## 2025-05-15 DIAGNOSIS — E10.9 TYPE 1 DIABETES MELLITUS WITHOUT COMPLICATION (HCC): ICD-10-CM

## 2025-05-15 RX ORDER — INSULIN LISPRO 100 [IU]/ML
70 INJECTION, SOLUTION INTRAVENOUS; SUBCUTANEOUS DAILY
Qty: 70 ML | Refills: 0 | Status: SHIPPED | OUTPATIENT
Start: 2025-05-15

## 2025-05-16 NOTE — TELEPHONE ENCOUNTER
Last time medication was refilled: 2/15/25  Next office visit due/scheduled: 12/2/25  Last office visit: 3/18/25  Last Labs: 3/13/25

## 2025-06-13 ENCOUNTER — OFFICE VISIT (OUTPATIENT)
Dept: SURGERY | Facility: CLINIC | Age: 50
End: 2025-06-13
Payer: COMMERCIAL

## 2025-06-13 DIAGNOSIS — Z90.13 ABSENCE OF BOTH BREASTS: Primary | ICD-10-CM

## 2025-06-13 PROCEDURE — 99213 OFFICE O/P EST LOW 20 MIN: CPT

## 2025-06-13 NOTE — PROGRESS NOTES
Sherlyn Martinez is a 49 year old female who presents today for a yearly follow-up.  She is without new complaints.  Specifically, she denies any masses or skin changes.    Patient has history of skin sparing mastectomies with Dr. De La Cruz in 2022.  Patient is most recently status post removal of bilateral breast tissue expanders, bilateral breast capsulorrhaphy, placement of silicone gel implants bilateral breasts (Natrelle Inspira SoftTouch breast implant, 385 mL), autologous fat grafting of bilateral reconstructed breasts with Dr. Arellano in 12/2022.    Physical Exam     There were no vitals filed for this visit.  HEENT: There is no cervical or supraclavicular lymphadenopathy noted.      Breasts: Bilateral breasts are soft without palpable masses.   There is no axillary lymphadenopathy noted bilaterally.  There is no erythema or seroma noted.      Assessment and Plan     Patient is doing well.  We reviewed the recommended FDA surveillance protocol for silicone implants.  We discussed continuing regular self breast examination with a plan for follow-up in 1 year or sooner if any changes are detected.  The plan was reviewed with the patient and questions were answered.

## 2025-06-27 ENCOUNTER — LAB ENCOUNTER (OUTPATIENT)
Dept: LAB | Age: 50
End: 2025-06-27
Attending: STUDENT IN AN ORGANIZED HEALTH CARE EDUCATION/TRAINING PROGRAM
Payer: COMMERCIAL

## 2025-06-27 DIAGNOSIS — K75.81 NASH (NONALCOHOLIC STEATOHEPATITIS): ICD-10-CM

## 2025-06-27 DIAGNOSIS — R79.89 ELEVATED SERUM CREATININE: ICD-10-CM

## 2025-06-27 LAB
ALBUMIN SERPL-MCNC: 4.6 G/DL (ref 3.2–4.8)
ALBUMIN/GLOB SERPL: 1.9 {RATIO} (ref 1–2)
ALP LIVER SERPL-CCNC: 72 U/L (ref 39–100)
ALT SERPL-CCNC: 40 U/L (ref 10–49)
ANION GAP SERPL CALC-SCNC: 6 MMOL/L (ref 0–18)
AST SERPL-CCNC: 48 U/L (ref ?–34)
BASOPHILS # BLD AUTO: 0.09 X10(3) UL (ref 0–0.2)
BASOPHILS NFR BLD AUTO: 1.6 %
BILIRUB SERPL-MCNC: 0.6 MG/DL (ref 0.3–1.2)
BUN BLD-MCNC: 11 MG/DL (ref 9–23)
CALCIUM BLD-MCNC: 9.7 MG/DL (ref 8.7–10.6)
CHLORIDE SERPL-SCNC: 104 MMOL/L (ref 98–112)
CO2 SERPL-SCNC: 27 MMOL/L (ref 21–32)
CREAT BLD-MCNC: 1.14 MG/DL (ref 0.55–1.02)
EGFRCR SERPLBLD CKD-EPI 2021: 59 ML/MIN/1.73M2 (ref 60–?)
EOSINOPHIL # BLD AUTO: 0.23 X10(3) UL (ref 0–0.7)
EOSINOPHIL NFR BLD AUTO: 4.2 %
ERYTHROCYTE [DISTWIDTH] IN BLOOD BY AUTOMATED COUNT: 12.8 %
FASTING STATUS PATIENT QL REPORTED: YES
GLOBULIN PLAS-MCNC: 2.4 G/DL (ref 2–3.5)
GLUCOSE BLD-MCNC: 145 MG/DL (ref 70–99)
HCT VFR BLD AUTO: 41.6 % (ref 35–48)
HGB BLD-MCNC: 13.9 G/DL (ref 12–16)
IMM GRANULOCYTES # BLD AUTO: 0.01 X10(3) UL (ref 0–1)
IMM GRANULOCYTES NFR BLD: 0.2 %
LYMPHOCYTES # BLD AUTO: 2.46 X10(3) UL (ref 1–4)
LYMPHOCYTES NFR BLD AUTO: 44.5 %
MCH RBC QN AUTO: 30.7 PG (ref 26–34)
MCHC RBC AUTO-ENTMCNC: 33.4 G/DL (ref 31–37)
MCV RBC AUTO: 91.8 FL (ref 80–100)
MONOCYTES # BLD AUTO: 0.47 X10(3) UL (ref 0.1–1)
MONOCYTES NFR BLD AUTO: 8.5 %
NEUTROPHILS # BLD AUTO: 2.27 X10 (3) UL (ref 1.5–7.7)
NEUTROPHILS # BLD AUTO: 2.27 X10(3) UL (ref 1.5–7.7)
NEUTROPHILS NFR BLD AUTO: 41 %
OSMOLALITY SERPL CALC.SUM OF ELEC: 286 MOSM/KG (ref 275–295)
PLATELET # BLD AUTO: 311 10(3)UL (ref 150–450)
POTASSIUM SERPL-SCNC: 4.1 MMOL/L (ref 3.5–5.1)
PROT SERPL-MCNC: 7 G/DL (ref 5.7–8.2)
RBC # BLD AUTO: 4.53 X10(6)UL (ref 3.8–5.3)
SODIUM SERPL-SCNC: 137 MMOL/L (ref 136–145)
WBC # BLD AUTO: 5.5 X10(3) UL (ref 4–11)

## 2025-06-27 PROCEDURE — 80053 COMPREHEN METABOLIC PANEL: CPT

## 2025-06-27 PROCEDURE — 85025 COMPLETE CBC W/AUTO DIFF WBC: CPT

## 2025-06-27 PROCEDURE — 36415 COLL VENOUS BLD VENIPUNCTURE: CPT

## 2025-07-01 PROBLEM — M65.311 TRIGGER THUMB OF RIGHT HAND: Status: ACTIVE | Noted: 2025-04-06

## 2025-07-01 PROBLEM — M65.312 TRIGGER THUMB OF LEFT HAND: Status: ACTIVE | Noted: 2025-04-06

## 2025-07-01 PROBLEM — M72.0 DUPUYTREN'S DISEASE OF PALM: Status: ACTIVE | Noted: 2025-04-06

## 2025-08-09 DIAGNOSIS — R80.9 MICROALBUMINURIA DUE TO TYPE 1 DIABETES MELLITUS (HCC): ICD-10-CM

## 2025-08-09 DIAGNOSIS — E10.29 MICROALBUMINURIA DUE TO TYPE 1 DIABETES MELLITUS (HCC): ICD-10-CM

## 2025-08-09 RX ORDER — RAMIPRIL 2.5 MG/1
CAPSULE ORAL
Qty: 90 CAPSULE | Refills: 0 | Status: SHIPPED | OUTPATIENT
Start: 2025-08-09

## 2025-08-12 ENCOUNTER — OFFICE VISIT (OUTPATIENT)
Dept: FAMILY MEDICINE CLINIC | Facility: CLINIC | Age: 50
End: 2025-08-12

## 2025-08-12 VITALS
OXYGEN SATURATION: 99 % | TEMPERATURE: 98 F | SYSTOLIC BLOOD PRESSURE: 118 MMHG | DIASTOLIC BLOOD PRESSURE: 78 MMHG | RESPIRATION RATE: 18 BRPM | WEIGHT: 142 LBS | HEART RATE: 109 BPM | BODY MASS INDEX: 25 KG/M2

## 2025-08-12 DIAGNOSIS — R39.9 LOWER URINARY TRACT SYMPTOMS: Primary | ICD-10-CM

## 2025-08-12 LAB
APPEARANCE: CLEAR
BILIRUBIN: NEGATIVE
GLUCOSE (URINE DIPSTICK): NEGATIVE MG/DL
KETONES (URINE DIPSTICK): NEGATIVE MG/DL
MULTISTIX LOT#: ABNORMAL NUMERIC
NITRITE, URINE: NEGATIVE
PH, URINE: 7 (ref 4.5–8)
PROTEIN (URINE DIPSTICK): 30 MG/DL
SPECIFIC GRAVITY: 1.01 (ref 1–1.03)
URINE-COLOR: YELLOW
UROBILINOGEN,SEMI-QN: 0.2 MG/DL (ref 0–1.9)

## 2025-08-12 PROCEDURE — 87086 URINE CULTURE/COLONY COUNT: CPT | Performed by: NURSE PRACTITIONER

## 2025-08-12 PROCEDURE — 87088 URINE BACTERIA CULTURE: CPT | Performed by: NURSE PRACTITIONER

## 2025-08-12 PROCEDURE — 87186 SC STD MICRODIL/AGAR DIL: CPT | Performed by: NURSE PRACTITIONER

## 2025-08-12 PROCEDURE — 81003 URINALYSIS AUTO W/O SCOPE: CPT | Performed by: NURSE PRACTITIONER

## 2025-08-12 PROCEDURE — 99213 OFFICE O/P EST LOW 20 MIN: CPT | Performed by: NURSE PRACTITIONER

## 2025-08-12 RX ORDER — FLUCONAZOLE 150 MG/1
150 TABLET ORAL ONCE
Qty: 1 TABLET | Refills: 0 | Status: SHIPPED | OUTPATIENT
Start: 2025-08-12 | End: 2025-08-12

## 2025-08-12 RX ORDER — NITROFURANTOIN 25; 75 MG/1; MG/1
100 CAPSULE ORAL 2 TIMES DAILY
Qty: 10 CAPSULE | Refills: 0 | Status: SHIPPED | OUTPATIENT
Start: 2025-08-12 | End: 2025-08-17

## 2025-08-17 DIAGNOSIS — E10.9 TYPE 1 DIABETES MELLITUS WITHOUT COMPLICATION (HCC): ICD-10-CM

## 2025-08-18 RX ORDER — INSULIN LISPRO 100 [IU]/ML
70 INJECTION, SOLUTION INTRAVENOUS; SUBCUTANEOUS DAILY
Qty: 70 ML | Refills: 0 | Status: SHIPPED | OUTPATIENT
Start: 2025-08-18

## 2025-08-20 ENCOUNTER — PATIENT MESSAGE (OUTPATIENT)
Dept: INTERNAL MEDICINE CLINIC | Facility: CLINIC | Age: 50
End: 2025-08-20

## 2025-08-20 DIAGNOSIS — E10.9 TYPE 1 DIABETES MELLITUS WITHOUT COMPLICATION (HCC): Primary | ICD-10-CM

## 2025-08-20 RX ORDER — ACYCLOVIR 800 MG/1
1 TABLET ORAL
Qty: 3 EACH | Refills: 0 | Status: SHIPPED | OUTPATIENT
Start: 2025-08-20

## (undated) DIAGNOSIS — N63.11 MASS OF UPPER OUTER QUADRANT OF RIGHT BREAST: Primary | ICD-10-CM

## (undated) DIAGNOSIS — E03.8 SUBCLINICAL HYPOTHYROIDISM: ICD-10-CM

## (undated) DIAGNOSIS — N30.90 CYSTITIS: Primary | ICD-10-CM

## (undated) DIAGNOSIS — E10.9 TYPE 1 DIABETES MELLITUS WITHOUT COMPLICATION (HCC): ICD-10-CM

## (undated) DIAGNOSIS — R89.7 ABNORMAL BREAST BIOPSY: Primary | ICD-10-CM

## (undated) DIAGNOSIS — N63.0 BREAST NODULE: Primary | ICD-10-CM

## (undated) DEVICE — CLIP MED INTNL HMCLP TNTLM

## (undated) DEVICE — PEN SKIN MARKING REG TIP VIOLT

## (undated) DEVICE — 3M™ IOBAN™ 2 ANTIMICROBIAL INCISE DRAPE 6648EZ: Brand: IOBAN™ 2

## (undated) DEVICE — 3M™ STERI-DRAPE™ INSTRUMENT POUCH 1018: Brand: STERI-DRAPE™

## (undated) DEVICE — GAMMEX® PI HYBRID SIZE 6.5, STERILE POWDER-FREE SURGICAL GLOVE, POLYISOPRENE AND NEOPRENE BLEND: Brand: GAMMEX

## (undated) DEVICE — #15 STERILE STAINLESS BLADE: Brand: STERILE STAINLESS BLADES

## (undated) DEVICE — Device: Brand: JELCO

## (undated) DEVICE — 40580 - THE PINK PAD - ADVANCED TRENDELENBURG POSITIONING KIT: Brand: 40580 - THE PINK PAD - ADVANCED TRENDELENBURG POSITIONING KIT

## (undated) DEVICE — SUT VICRYL 3-0 SH J416H

## (undated) DEVICE — ABDOMINAL PAD: Brand: CURITY

## (undated) DEVICE — 6 ML SYRINGE LUER-LOCK TIP: Brand: MONOJECT

## (undated) DEVICE — 3M™ STERI-STRIP™ REINFORCED ADHESIVE SKIN CLOSURES, R1548, 1 IN X 5 IN (25 MM X 125 MM), 4 STRIPS/ENVELOPE: Brand: 3M™ STERI-STRIP™

## (undated) DEVICE — MINOR GENERAL: Brand: MEDLINE INDUSTRIES, INC.

## (undated) DEVICE — DRAPE PACK CHEST & U BAR

## (undated) DEVICE — SOL NACL IRRIG 0.9% 1000ML BTL

## (undated) DEVICE — 3M(TM) TEGADERM(TM) TRANSPARENT FILM DRESSING FRAME STYLE 9505W: Brand: 3M™ TEGADERM™

## (undated) DEVICE — 1010 S-DRAPE TOWEL DRAPE 10/BX: Brand: STERI-DRAPE™

## (undated) DEVICE — 3M™ TEGADERM™ TRANSPARENT FILM DRESSING FRAME STYLE, 1624W, US-VER, 100/CARTON 4 CARTONS/CASE: Brand: 3M™ TEGADERM™

## (undated) DEVICE — CLOSURE EXOFIN 1.0ML

## (undated) DEVICE — SUT MONOCRYL 4-0 PS-2 Y426H

## (undated) DEVICE — STERILE POLYISOPRENE POWDER-FREE SURGICAL GLOVES: Brand: PROTEXIS

## (undated) DEVICE — TOWEL SURG OR 17X30IN BLUE

## (undated) DEVICE — DRESSING BIOPATCH 1X4 BLUE

## (undated) DEVICE — SLEEVE KENDALL SCD EXPRESS MED

## (undated) DEVICE — SUT SILK 2-0 FS 685G

## (undated) DEVICE — SYRINGE 50ML LL TIP

## (undated) DEVICE — CONTAINER GENT-L-KARE 4OZ GRAY

## (undated) DEVICE — SCRUB PVP -1 PREP SOLUTION 4OZ

## (undated) DEVICE — SYRINGE 5ML LL TIP

## (undated) DEVICE — DRAPE TAPE: Brand: CONVERTORS

## (undated) DEVICE — DRAPE,TAPE STRIPS,STERILE: Brand: MEDLINE

## (undated) DEVICE — SIZER BREAST IMPLANT FP 365CC

## (undated) DEVICE — SOLUTION  .9 1000ML BTL

## (undated) DEVICE — EVACUATOR RELIAVAC 100CC

## (undated) DEVICE — LIGHT HANDLE

## (undated) DEVICE — HEMOCLIP HORIZON SM 001200

## (undated) DEVICE — DRAIN ROUND HUBLESS 15FR

## (undated) DEVICE — ASPIRATION TUBING SET, DISPOSABLE: Brand: MICROAIRE®

## (undated) DEVICE — SYRINGE CATH TIP 50ML

## (undated) DEVICE — PROVE COVER: Brand: UNBRANDED

## (undated) DEVICE — SUT PDS II 2-0 CT-2 Z333H

## (undated) DEVICE — PLASTIC BREAST CDS-LF: Brand: MEDLINE INDUSTRIES, INC.

## (undated) DEVICE — GAUZE SPONGES,12 PLY: Brand: CURITY

## (undated) DEVICE — SIZER BREAST IMPLANT FP 415CC

## (undated) DEVICE — PROXIMATE RH ROTATING HEAD SKIN STAPLERS (35 WIDE) CONTAINS 35 STAINLESS STEEL STAPLES: Brand: PROXIMATE

## (undated) DEVICE — ADHESIVE MASTISOL 2/3ML

## (undated) DEVICE — STOPCOCK IV 4 WAY BD

## (undated) DEVICE — SUPER SPONGES,MEDIUM: Brand: KERLIX

## (undated) DEVICE — Device

## (undated) DEVICE — 60 ML SYRINGE,TOOMEY TYPE: Brand: MONOJECT

## (undated) DEVICE — TRAY SURESTEP 16 BARDEX UMETR

## (undated) DEVICE — GAUZE,SPONGE,FLUFF,6"X6.75",STRL,5/TRAY: Brand: MEDLINE

## (undated) DEVICE — 3M™ IOBAN™ 2 ANTIMICROBIAL INCISE DRAPE 6651EZ: Brand: IOBAN™ 2

## (undated) DEVICE — BREAST-HERNIA-PORT CDS-LF: Brand: MEDLINE INDUSTRIES, INC.

## (undated) DEVICE — MEGADYNE E-Z CLEAN BLADE 2.75"

## (undated) DEVICE — LAPAROTOMY SPONGE - RF AND X-RAY DETECTABLE PRE-WASHED: Brand: SITUATE

## (undated) DEVICE — UNDERPAD 23X36 LIGHT CHUX

## (undated) DEVICE — VIOLET BRAIDED (POLYGLACTIN 910), SYNTHETIC ABSORBABLE SUTURE: Brand: COATED VICRYL

## (undated) DEVICE — STANDARD HYPODERMIC NEEDLE,POLYPROPYLENE HUB: Brand: MONOJECT

## (undated) DEVICE — HEMOCLIP HORIZON MED 002200

## (undated) DEVICE — SIZER BREAST IMPLANT FP 385CC

## (undated) DEVICE — SUT MONOCRYL 4-0 PS-2 Y496G

## (undated) DEVICE — CLIP SM INTNL HMCLP TNTLM ESCP

## (undated) DEVICE — 3M™ STERI-STRIP™ REINFORCED ADHESIVE SKIN CLOSURES, R1547, 1/2 IN X 4 IN (12 MM X 100 MM), 6 STRIPS/ENVELOPE: Brand: 3M™ STERI-STRIP™

## (undated) DEVICE — CG INFILTRATION TUBING: Brand: CG INFILTRATION TUBING

## (undated) DEVICE — FLEXIBLE YANKAUER,MEDIUM TIP, NO VACUUM CONTROL: Brand: ARGYLE

## (undated) DEVICE — 12 ML SYRINGE LUER-LOCK TIP: Brand: MONOJECT

## (undated) DEVICE — SUT ETHILON 3-0 FS-1 663H

## (undated) NOTE — LETTER
OUTSIDE TESTING RESULT REQUEST     IMPORTANT: FOR YOUR IMMEDIATE ATTENTION  Please FAX all test results listed below to: [phone]     Testing already done on or about: 22    * * * * If testing is NOT complete, arrange with patient A.S.A.P. * * * *      Patient Name: Tiffany Dorsey  Surgery Date: 2022  CSN: [id number]  Medical Record: [id number]   : 10/21/1975 - A: 56 y      Sex: female  Surgeon(s):  Jennifer Gaspar MD  Procedure: Removal of bilateral breast tissue expanders with placement of permanent implant and autologous fat grafting  Anesthesia Type: General     Surgeon: Jennifer Gasapr MD     The following Testing and Time Line are REQUIRED PER ANESTHESIA     EKG READ AND SIGNED WITHIN   90 days      Thank Ekaterina Campuzano by:Saadia CHRISTINE

## (undated) NOTE — MR AVS SNAPSHOT
After Visit Summary   3/11/2021    Kerrie Galaviz    MRN: [id number]           Visit Information     Date & Time  3/11/2021  9:30 AM Provider  [facility] [address], [place] Dept.  Phone  [phone] WITH HPV REFLEX REQUEST B [[id number] CUSTOM]     THINPREP PAP WITH HPV REFLEX REQUEST [[id number] CUSTOM]     Future Labs/Procedures Expected by Expires    ASSAY, THYROID STIM HORMONE [[id number] CUSTOM]  3/11/2021 (Approximate) 3/11/2022    FREE T4 (FREE THYROXIN provide feedback. We strive to deliver the best patient experience and are looking for ways to make improvements. Your feedback will help us do so. For more information on [employer], please visit [email or web address] $2,300*   *Cost varies based on your insurance coverage  For more information about hours, locations or appointment options available at [facility],   visit [email or web address] or call [phone]. ([phone])

## (undated) NOTE — LETTER
[address] [place] [address]      Authorization for Surgical Operation and Procedure     Date:___________                                                                                                         Time:__________  1. I hereby Jose Hale MD, my physician and his/her assistants (if applicable), which may include medical students, residents, and/or fellows, to perform the following surgical operation/ procedure and administer such anesthesia as may be determined necessary by my physician:  Operation/Procedure name (s)   Right breast excisional biopsy  on Jac Klein   2. I recognize that during the surgical operation/procedure, unforeseen conditions may necessitate additional or different procedures than those listed above. I, therefore, further authorize and request that the above-named surgeon, assistants, or designees perform such procedures as are, in their judgment, necessary and desirable. 3.   My surgeon/physician has discussed prior to my surgery the potential benefits, risks and side effects of this procedure; the likelihood of achieving goals; and potential problems that might occur during recuperation. They also discussed reasonable alternatives to the procedure, including risks, benefits, and side effects related to the alternatives and risks related to not receiving this procedure. I have had all my questions answered and I acknowledge that no guarantee has been made as to the result that may be obtained. 4.   Should the need arise during my operation or immediate post-operative period, I also consent to the administration of blood and/or blood products. Further, I understand that despite careful testing and screening of blood or blood products by collecting agencies, I may still be subject to ill effects as a result of receiving a blood transfusion and/or blood products.   The following are some, but not all, of the potential risks that can occur: fever and allergic reactions, hemolytic reactions, transmission of diseases such as Hepatitis, AIDS and Cytomegalovirus (CMV) and fluid overload. In the event that I wish to have an autologous transfusion of my own blood, or a directed donor transfusion. I will discuss this with my physician. 5.   I authorize the use of any specimen, organs, tissues, body parts or foreign objects that may be removed from my body during the operation/procedure for diagnosis, research or teaching purposes and their subsequent disposal by hospital authorities. I also authorize the release of specimen test results and/or written reports to my treating physician on the hospital medical staff or other referring or consulting physicians involved in my care, at the discretion of the Pathologist or my treating physician. 6.   I consent to the photographing or videotaping of the operations or procedures to be performed, including appropriate portions of my body for medical, scientific, or educational purposes, provided my identity is not revealed by the pictures or by descriptive texts accompanying them. If the procedure has been photographed/videotaped, the surgeon will obtain the original picture, image, videotape or CD. The hospital will not be responsible for storage, release or maintenance of the picture, image, tape or CD.    7.   I consent to the presence of a  or observers in the operating room as deemed necessary by my physician or their designees. 8.   I recognize that in the event my procedure results in extended X-Ray/fluoroscopy time, I may develop a skin reaction. 9. If I have a Do Not Attempt Resuscitation (DNAR) order in place, that status will be suspended while in the operating room, procedural suite, and during the recovery period unless otherwise explicitly stated by me (or a person authorized to consent on my behalf).  The surgeon or my attending physician will determine when the applicable recovery period ends for purposes of reinstating the DNAR order. 10. Patients having a sterilization procedure: I understand that if the procedure is successful the results will be permanent and it will therefore be impossible for me to inseminate, conceive, or bear children. I also understand that the procedure is intended to result in sterility, although the result has not been guaranteed. 11. I acknowledge that my physician has explained sedation/analgesia administration to me including the risk and benefits I consent to the administration of sedation/analgesia as may be necessary or desirable in the judgment of my physician. I CERTIFY THAT I HAVE READ AND FULLY UNDERSTAND THE ABOVE CONSENT TO OPERATION and/or OTHER PROCEDURE.    _________________________________________  __________________________________  Signature of Patient     Signature of Responsible Person         ___________________________________         Printed Name of Responsible Person           _________________________________                  Relationship to Patient  _________________________________________  ______________________________  Signature of Witness          Date  Time    STATEMENT OF PHYSICIAN My signature below affirms that prior to the time of the procedure; I have explained to the patient and/or his/her legal representative, the risks and benefits involved in the proposed treatment and any reasonable alternative to the proposed treatment. I have also explained the risks and benefits involved in refusal of the proposed treatment and alternatives to the proposed treatment and have answered the patient's questions. If I have a significant financial interest in a co-management agreement or a significant financial interest in any product or implant, or other significant relationship used in this procedure/surgery, I have disclosed this and had a discussion with my patient. _______________________________________________________________ _____________________________  Lucinda Sharma Physician)                                                                                         (Date)                                   (Time)        Patient Name: Roldan Meade    : 10/21/1975   Printed: 2022      Medical Record #: [id number]                                              Page 1

## (undated) NOTE — LETTER
[address] [place] [address]      Authorization for Surgical Operation and Procedure     Date:___________                                                                                                         Time:__________  1. I hereby authorize                                                                                 , MD, my physician and his/her assistants (if applicable), which may include medical students, residents, and/or fellows, to perform the following surgical operation/ procedure and administer such anesthesia as may be determined necessary by my physician:  Operation/Procedure name (s)   Right breast wire localization  on [facility]   2. I recognize that during the surgical operation/procedure, unforeseen conditions may necessitate additional or different procedures than those listed above. I, therefore, further authorize and request that the above-named surgeon, assistants, or designees perform such procedures as are, in their judgment, necessary and desirable. 3.   My surgeon/physician has discussed prior to my surgery the potential benefits, risks and side effects of this procedure; the likelihood of achieving goals; and potential problems that might occur during recuperation. They also discussed reasonable alternatives to the procedure, including risks, benefits, and side effects related to the alternatives and risks related to not receiving this procedure. I have had all my questions answered and I acknowledge that no guarantee has been made as to the result that may be obtained. 4.   Should the need arise during my operation or immediate post-operative period, I also consent to the administration of blood and/or blood products.   Further, I understand that despite careful testing and screening of blood or blood products by collecting agencies, I may still be subject to ill effects as a result of receiving a blood transfusion and/or blood products. The following are some, but not all, of the potential risks that can occur: fever and allergic reactions, hemolytic reactions, transmission of diseases such as Hepatitis, AIDS and Cytomegalovirus (CMV) and fluid overload. In the event that I wish to have an autologous transfusion of my own blood, or a directed donor transfusion. I will discuss this with my physician. 5.   I authorize the use of any specimen, organs, tissues, body parts or foreign objects that may be removed from my body during the operation/procedure for diagnosis, research or teaching purposes and their subsequent disposal by hospital authorities. I also authorize the release of specimen test results and/or written reports to my treating physician on the hospital medical staff or other referring or consulting physicians involved in my care, at the discretion of the Pathologist or my treating physician. 6.   I consent to the photographing or videotaping of the operations or procedures to be performed, including appropriate portions of my body for medical, scientific, or educational purposes, provided my identity is not revealed by the pictures or by descriptive texts accompanying them. If the procedure has been photographed/videotaped, the surgeon will obtain the original picture, image, videotape or CD. The hospital will not be responsible for storage, release or maintenance of the picture, image, tape or CD.    7.   I consent to the presence of a  or observers in the operating room as deemed necessary by my physician or their designees. 8.   I recognize that in the event my procedure results in extended X-Ray/fluoroscopy time, I may develop a skin reaction. 9.  If I have a Do Not Attempt Resuscitation (DNAR) order in place, that status will be suspended while in the operating room, procedural suite, and during the recovery period unless otherwise explicitly stated by me (or a person authorized to consent on my behalf). The surgeon or my attending physician will determine when the applicable recovery period ends for purposes of reinstating the DNAR order. 10. Patients having a sterilization procedure: I understand that if the procedure is successful the results will be permanent and it will therefore be impossible for me to inseminate, conceive, or bear children. I also understand that the procedure is intended to result in sterility, although the result has not been guaranteed. 11. I acknowledge that my physician has explained sedation/analgesia administration to me including the risk and benefits I consent to the administration of sedation/analgesia as may be necessary or desirable in the judgment of my physician. I CERTIFY THAT I HAVE READ AND FULLY UNDERSTAND THE ABOVE CONSENT TO OPERATION and/or OTHER PROCEDURE.    _________________________________________  __________________________________  Signature of Patient     Signature of Responsible Person         ___________________________________         Printed Name of Responsible Person           _________________________________                  Relationship to Patient  _________________________________________  ______________________________  Signature of Witness          Date  Time    STATEMENT OF PHYSICIAN My signature below affirms that prior to the time of the procedure; I have explained to the patient and/or his/her legal representative, the risks and benefits involved in the proposed treatment and any reasonable alternative to the proposed treatment. I have also explained the risks and benefits involved in refusal of the proposed treatment and alternatives to the proposed treatment and have answered the patient's questions.  If I have a significant financial interest in a co-management agreement or a significant financial interest in any product or implant, or other significant relationship used in this procedure/surgery, I have disclosed this and had a discussion with my patient.     _______________________________________________________________ _____________________________  Katie Cotter)                                                                                         (Date)                                   (Time)        Patient Name: Yajaira Lopez    : 10/21/1975   Printed: 2022      Medical Record #: [id number]                                              Page 1 of 1

## (undated) NOTE — LETTER
[facility]  [address] [facility] [address]  [facility]: [phone]  FAX: Beba Donald Clearance Request    MD Aisha Maganapci [address]  [address] [address]  Via In Basket    The patient listed below is scheduled for surgery and needs the following pre-op labs and/or clearance prior to surgery. The patient has been instructed to schedule an appointment with you for a pre-op physical.      Patient: Catina Mccann  : 10/21/1975    Surgeon:  Dr. Joann Harris    Procedure:  Immediate breast reconstruction with placement of bilateral breast tissue expanders, acellular dermal matrix. 2 hours (hours total with Dr. Marielle kaba), general anesthesia, outpatient. Surgery Date:  TBD  Location:  TBD        [x] CBC    [x] CMP /HGBA1C    [] CTA (abdomen/pelvis) [x] EKG    [] Albumin/Prealbumin  [] Cardiac Clearance    [x] History and Physical  [x] Medical Clearance    [] Urine Nicotine      Please fax to fax number indicated above when completed. Call [phone] with any questions. Thank you for your prompt attention to these requirements.     [facility]